# Patient Record
Sex: FEMALE | Race: WHITE | NOT HISPANIC OR LATINO | Employment: UNEMPLOYED | URBAN - NONMETROPOLITAN AREA
[De-identification: names, ages, dates, MRNs, and addresses within clinical notes are randomized per-mention and may not be internally consistent; named-entity substitution may affect disease eponyms.]

---

## 2017-07-05 ENCOUNTER — APPOINTMENT (OUTPATIENT)
Dept: LAB | Facility: HOSPITAL | Age: 52
End: 2017-07-05

## 2017-07-05 ENCOUNTER — OFFICE VISIT (OUTPATIENT)
Dept: FAMILY MEDICINE CLINIC | Facility: CLINIC | Age: 52
End: 2017-07-05

## 2017-07-05 VITALS
BODY MASS INDEX: 24.41 KG/M2 | DIASTOLIC BLOOD PRESSURE: 72 MMHG | SYSTOLIC BLOOD PRESSURE: 124 MMHG | HEIGHT: 64 IN | WEIGHT: 143 LBS

## 2017-07-05 DIAGNOSIS — E78.2 MIXED HYPERLIPIDEMIA: Chronic | ICD-10-CM

## 2017-07-05 DIAGNOSIS — R21 RASH: ICD-10-CM

## 2017-07-05 DIAGNOSIS — Z11.59 NEED FOR HEPATITIS C SCREENING TEST: ICD-10-CM

## 2017-07-05 DIAGNOSIS — Z79.899 HIGH RISK MEDICATION USE: ICD-10-CM

## 2017-07-05 DIAGNOSIS — F33.1 MODERATE EPISODE OF RECURRENT MAJOR DEPRESSIVE DISORDER (HCC): Chronic | ICD-10-CM

## 2017-07-05 DIAGNOSIS — I10 ESSENTIAL HYPERTENSION: Primary | Chronic | ICD-10-CM

## 2017-07-05 DIAGNOSIS — Z23 NEED FOR PNEUMOCOCCAL VACCINE: ICD-10-CM

## 2017-07-05 PROBLEM — R73.03 PREDIABETES: Status: ACTIVE | Noted: 2017-07-05

## 2017-07-05 LAB
ALBUMIN SERPL-MCNC: 4.4 G/DL (ref 3.4–4.8)
ALBUMIN/GLOB SERPL: 1.4 G/DL (ref 1.1–1.8)
ALP SERPL-CCNC: 103 U/L (ref 38–126)
ALT SERPL W P-5'-P-CCNC: 34 U/L (ref 9–52)
ANION GAP SERPL CALCULATED.3IONS-SCNC: 10 MMOL/L (ref 5–15)
ARTICHOKE IGE QN: 153 MG/DL (ref 1–129)
AST SERPL-CCNC: 36 U/L (ref 14–36)
BILIRUB SERPL-MCNC: 0.4 MG/DL (ref 0.2–1.3)
BUN BLD-MCNC: 14 MG/DL (ref 7–21)
BUN/CREAT SERPL: 14.6 (ref 7–25)
CALCIUM SPEC-SCNC: 9.2 MG/DL (ref 8.4–10.2)
CHLORIDE SERPL-SCNC: 103 MMOL/L (ref 95–110)
CHOLEST SERPL-MCNC: 223 MG/DL (ref 0–199)
CO2 SERPL-SCNC: 28 MMOL/L (ref 22–31)
CREAT BLD-MCNC: 0.96 MG/DL (ref 0.5–1)
DEPRECATED RDW RBC AUTO: 41.5 FL (ref 36.4–46.3)
ERYTHROCYTE [DISTWIDTH] IN BLOOD BY AUTOMATED COUNT: 12.2 % (ref 11.5–14.5)
GFR SERPL CREATININE-BSD FRML MDRD: 61 ML/MIN/1.73 (ref 51–120)
GLOBULIN UR ELPH-MCNC: 3.2 GM/DL (ref 2.3–3.5)
GLUCOSE BLD-MCNC: 100 MG/DL (ref 60–100)
HBA1C MFR BLD: 5.75 % (ref 4–5.6)
HCT VFR BLD AUTO: 43.3 % (ref 35–45)
HCV AB SER DONR QL: NEGATIVE
HDLC SERPL-MCNC: 48 MG/DL (ref 60–200)
HGB BLD-MCNC: 14.6 G/DL (ref 12–15.5)
INR PPP: 1.04 (ref 0.8–1.2)
LDLC/HDLC SERPL: 3.23 {RATIO} (ref 0–3.22)
MCH RBC QN AUTO: 31.9 PG (ref 26.5–34)
MCHC RBC AUTO-ENTMCNC: 33.7 G/DL (ref 31.4–36)
MCV RBC AUTO: 94.5 FL (ref 80–98)
PLATELET # BLD AUTO: 175 10*3/MM3 (ref 150–450)
PMV BLD AUTO: 10.7 FL (ref 8–12)
POTASSIUM BLD-SCNC: 4.6 MMOL/L (ref 3.5–5.1)
PROT SERPL-MCNC: 7.6 G/DL (ref 6.3–8.6)
PROTHROMBIN TIME: 13.5 SECONDS (ref 11.1–15.3)
RBC # BLD AUTO: 4.58 10*6/MM3 (ref 3.77–5.16)
SODIUM BLD-SCNC: 141 MMOL/L (ref 137–145)
TRIGL SERPL-MCNC: 99 MG/DL (ref 20–199)
WBC NRBC COR # BLD: 5.97 10*3/MM3 (ref 3.2–9.8)

## 2017-07-05 PROCEDURE — 83036 HEMOGLOBIN GLYCOSYLATED A1C: CPT | Performed by: FAMILY MEDICINE

## 2017-07-05 PROCEDURE — 90471 IMMUNIZATION ADMIN: CPT | Performed by: FAMILY MEDICINE

## 2017-07-05 PROCEDURE — 80061 LIPID PANEL: CPT | Performed by: FAMILY MEDICINE

## 2017-07-05 PROCEDURE — 99214 OFFICE O/P EST MOD 30 MIN: CPT | Performed by: FAMILY MEDICINE

## 2017-07-05 PROCEDURE — 90732 PPSV23 VACC 2 YRS+ SUBQ/IM: CPT | Performed by: FAMILY MEDICINE

## 2017-07-05 PROCEDURE — 86803 HEPATITIS C AB TEST: CPT | Performed by: FAMILY MEDICINE

## 2017-07-05 PROCEDURE — 36415 COLL VENOUS BLD VENIPUNCTURE: CPT | Performed by: FAMILY MEDICINE

## 2017-07-05 PROCEDURE — 85610 PROTHROMBIN TIME: CPT | Performed by: FAMILY MEDICINE

## 2017-07-05 PROCEDURE — 85027 COMPLETE CBC AUTOMATED: CPT | Performed by: FAMILY MEDICINE

## 2017-07-05 PROCEDURE — 80053 COMPREHEN METABOLIC PANEL: CPT | Performed by: FAMILY MEDICINE

## 2017-07-05 RX ORDER — HYDROCHLOROTHIAZIDE 25 MG/1
25 TABLET ORAL DAILY
Qty: 90 TABLET | Refills: 3 | Status: SHIPPED | OUTPATIENT
Start: 2017-07-05 | End: 2018-06-26 | Stop reason: SDUPTHER

## 2017-07-05 RX ORDER — SIMVASTATIN 40 MG
40 TABLET ORAL NIGHTLY
Qty: 90 TABLET | Refills: 3 | Status: SHIPPED | OUTPATIENT
Start: 2017-07-05 | End: 2018-06-26 | Stop reason: SDUPTHER

## 2017-07-05 RX ORDER — BUDESONIDE AND FORMOTEROL FUMARATE DIHYDRATE 160; 4.5 UG/1; UG/1
2 AEROSOL RESPIRATORY (INHALATION)
Qty: 1 INHALER | Refills: 11 | Status: SHIPPED | OUTPATIENT
Start: 2017-07-05 | End: 2017-07-07 | Stop reason: CLARIF

## 2017-07-05 RX ORDER — BETAMETHASONE DIPROPIONATE 0.5 MG/G
OINTMENT TOPICAL 2 TIMES DAILY
Qty: 50 G | Refills: 2 | Status: SHIPPED | OUTPATIENT
Start: 2017-07-05 | End: 2018-04-27

## 2017-07-05 RX ORDER — ALBUTEROL SULFATE 90 UG/1
2 AEROSOL, METERED RESPIRATORY (INHALATION) EVERY 6 HOURS PRN
Qty: 1 INHALER | Refills: 11 | Status: SHIPPED | OUTPATIENT
Start: 2017-07-05 | End: 2018-06-26 | Stop reason: SDUPTHER

## 2017-07-05 RX ORDER — PAROXETINE 10 MG/1
10 TABLET, FILM COATED ORAL EVERY MORNING
Qty: 30 TABLET | Refills: 2 | Status: SHIPPED | OUTPATIENT
Start: 2017-07-05 | End: 2017-09-07 | Stop reason: DRUGHIGH

## 2017-07-05 NOTE — PROGRESS NOTES
Subjective   Patricia Collins is a 52 y.o. female.     Hypertension   This is a chronic problem. The current episode started more than 1 year ago. The problem is unchanged. The problem is controlled. Pertinent negatives include no anxiety, blurred vision, chest pain, headaches, malaise/fatigue, neck pain, orthopnea, palpitations, peripheral edema, PND, shortness of breath or sweats. There are no associated agents to hypertension. Risk factors for coronary artery disease include post-menopausal state and smoking/tobacco exposure. Past treatments include diuretics. The current treatment provides moderate improvement. There are no compliance problems.  There is no history of angina, kidney disease, CAD/MI, CVA, heart failure, left ventricular hypertrophy, PVD or retinopathy.   Hyperlipidemia   This is a chronic problem. The current episode started more than 1 year ago. The problem is uncontrolled. Recent lipid tests were reviewed and are variable. Factors aggravating her hyperlipidemia include smoking. Pertinent negatives include no chest pain or shortness of breath. Current antihyperlipidemic treatment includes statins. The current treatment provides mild improvement of lipids. Compliance problems: She has been out of her medication.    Rash   This is a chronic problem. The current episode started more than 1 year ago. The problem has been gradually worsening since onset. The affected locations include the right foot, left foot, left ankle and right ankle. The rash is characterized by itchiness and redness. She was exposed to nothing. Associated symptoms include coughing and fatigue. Pertinent negatives include no diarrhea, shortness of breath or vomiting. Past treatments include topical steroids. The treatment provided no relief. There is no history of allergies, eczema or varicella.   Depression   Visit Type: follow-up  Patient presents with the following symptoms: anhedonia, depressed mood, fatigue, insomnia,  irritability and restlessness.  Patient is not experiencing: chest pain, choking sensation, compulsions, confusion, decreased concentration, dizziness, dry mouth, excessive worry, feelings of hopelessness, feelings of worthlessness, hypersomnia, hyperventilation, impotence, nervousness/anxiety, palpitations, panic, psychomotor agitation, psychomotor retardation, shortness of breath, suicidal ideas, suicidal planning, thoughts of death, weight gain and weight loss.  Frequency of symptoms: most days   Severity: moderate   Sleep quality: good  Nighttime awakenings: occasional  Compliance with medications:  %             Current Outpatient Prescriptions:   •  albuterol (PROVENTIL HFA;VENTOLIN HFA) 108 (90 BASE) MCG/ACT inhaler, Inhale 2 puffs every 6 (six) hours as needed for wheezing., Disp: , Rfl:   •  budesonide-formoterol (SYMBICORT) 160-4.5 MCG/ACT inhaler, Inhale 2 puffs 2 (two) times a day., Disp: , Rfl:   •  FLUTICASONE FUROATE NA, 1 spray into each nostril daily., Disp: , Rfl:   •  hydrochlorothiazide (HYDRODIURIL) 25 MG tablet, Take 25 mg by mouth daily., Disp: , Rfl:   •  simvastatin (ZOCOR) 40 MG tablet, Take 40 mg by mouth every night., Disp: , Rfl:     The following portions of the patient's history were reviewed and updated as appropriate: allergies, current medications, past family history, past medical history, past social history, past surgical history and problem list.    Review of Systems   Constitutional: Positive for fatigue and irritability. Negative for activity change, appetite change, malaise/fatigue, unexpected weight change, weight gain and weight loss.   Eyes: Negative for blurred vision and visual disturbance.   Respiratory: Positive for cough. Negative for choking, shortness of breath and wheezing.    Cardiovascular: Negative for chest pain, palpitations, orthopnea, leg swelling and PND.   Gastrointestinal: Negative for abdominal distention, abdominal pain, constipation, diarrhea,  "nausea and vomiting.   Genitourinary: Negative for impotence.   Musculoskeletal: Positive for arthralgias. Negative for back pain, gait problem, joint swelling and neck pain.   Skin: Positive for rash. Negative for pallor and wound.   Neurological: Negative for headaches.   Psychiatric/Behavioral: Positive for agitation and dysphoric mood. Negative for confusion, decreased concentration, sleep disturbance and suicidal ideas. The patient has insomnia. The patient is not nervous/anxious.        Objective    Vitals:    07/05/17 0846   BP: 124/72   Weight: 143 lb (64.9 kg)   Height: 64\" (162.6 cm)     Physical Exam   Constitutional: She is oriented to person, place, and time. She appears well-developed and well-nourished. No distress.   Cardiovascular: Normal rate, regular rhythm and normal heart sounds.    No murmur heard.  No LE edema.   Pulmonary/Chest: Effort normal and breath sounds normal. No respiratory distress.   Abdominal: Soft. Bowel sounds are normal. She exhibits no distension. There is no tenderness.   Neurological: She is alert and oriented to person, place, and time.   Skin:   Small erythematous, raised rash on legs BL.  Limited to feet.  Not scaly or warm to touch.  Appear petechial in areas.   Psychiatric: Her behavior is normal. Judgment and thought content normal.   Flat affect and depressed mood.   Nursing note and vitals reviewed.      Assessment/Plan   Problems Addressed this Visit        Cardiovascular and Mediastinum    Hyperlipidemia    Relevant Medications    simvastatin (ZOCOR) 40 MG tablet    Other Relevant Orders    Lipid Panel       Other    Moderate episode of recurrent major depressive disorder    Relevant Medications    PARoxetine (PAXIL) 10 MG tablet      Other Visit Diagnoses     Essential hypertension  (Chronic)   -  Primary    Relevant Medications    hydrochlorothiazide (HYDRODIURIL) 25 MG tablet    Rash        Relevant Medications    betamethasone, augmented, (DIPROLENE) 0.05 % " ointment    Other Relevant Orders    Ambulatory Referral to Dermatology    Protime-INR    Need for hepatitis C screening test        Relevant Orders    Hepatitis C antibody    High risk medication use        Relevant Orders    Comprehensive Metabolic Panel    Hemoglobin A1c    CBC (No Diff)    Need for pneumococcal vaccine        Relevant Orders    Pneumococcal polysaccharide vaccine 23-valent >= 1yo subcutaneous/IM (PPSV23)        1.) HTN-  Well controlled on current medications. Check CMP, A1C today.  2.) Hyperlipidemia-  Will recheck lipids today.  Refilled her medication.  3.) Depression- Will try paxil and see if that helps.    4.) Rash-  Will check CBC and INR today. Referred to dermatology as well.  Called in stronger topical steroid for her.  5.) Hep C screening today.  6.) [id number] given today in the office.  RTC in 1.5 months or sooner PRN.  Will do pap smear at that appointment.

## 2017-07-06 ENCOUNTER — TELEPHONE (OUTPATIENT)
Dept: FAMILY MEDICINE CLINIC | Facility: CLINIC | Age: 52
End: 2017-07-06

## 2017-07-06 NOTE — PROGRESS NOTES
Pr Dr. JOCELINE Rutledge, Ms. Collins has been called with her recent lab results & recommendations.  Continue her current medications and follow-up as planned or sooner if any problems.

## 2017-07-06 NOTE — TELEPHONE ENCOUNTER
Pr Dr. JOCELINE Rutledge, Ms. Collins has been called with her recent lab results & recommendations.  Continue her current medications and follow-up as planned or sooner if any problems.        ----- Message from Alicja Rutledge MD sent at 7/5/2017  5:05 PM CDT -----  Please let her know that her hep c test was negative.  Blood counts looked okay and blood clotting factors normal.  Cholestorol elevated so she needs to restart her medication.  Her blood sugars are elevated but she isn't diabetic.  She is prediabetic.  Will continue to monitor.  For now she just needs to make diet modifications.  Cut back on sugary drinks and carbs.  Will recheck in 6 months.

## 2017-09-07 ENCOUNTER — OFFICE VISIT (OUTPATIENT)
Dept: FAMILY MEDICINE CLINIC | Facility: CLINIC | Age: 52
End: 2017-09-07

## 2017-09-07 VITALS
DIASTOLIC BLOOD PRESSURE: 76 MMHG | HEIGHT: 64 IN | BODY MASS INDEX: 24.3 KG/M2 | SYSTOLIC BLOOD PRESSURE: 122 MMHG | WEIGHT: 142.3 LBS

## 2017-09-07 DIAGNOSIS — Z12.4 ENCOUNTER FOR SCREENING FOR CERVICAL CANCER: ICD-10-CM

## 2017-09-07 DIAGNOSIS — I10 BENIGN HYPERTENSION: ICD-10-CM

## 2017-09-07 DIAGNOSIS — F33.1 MODERATE EPISODE OF RECURRENT MAJOR DEPRESSIVE DISORDER (HCC): Primary | ICD-10-CM

## 2017-09-07 PROCEDURE — 87624 HPV HI-RISK TYP POOLED RSLT: CPT | Performed by: FAMILY MEDICINE

## 2017-09-07 PROCEDURE — 88142 CYTOPATH C/V THIN LAYER: CPT | Performed by: FAMILY MEDICINE

## 2017-09-07 PROCEDURE — 99214 OFFICE O/P EST MOD 30 MIN: CPT | Performed by: FAMILY MEDICINE

## 2017-09-07 RX ORDER — PAROXETINE HYDROCHLORIDE 20 MG/1
20 TABLET, FILM COATED ORAL EVERY MORNING
Qty: 30 TABLET | Refills: 2 | Status: SHIPPED | OUTPATIENT
Start: 2017-09-07 | End: 2018-01-23 | Stop reason: DRUGHIGH

## 2017-09-07 RX ORDER — HYDROXYZINE PAMOATE 25 MG/1
50 CAPSULE ORAL 2 TIMES DAILY PRN
Qty: 120 CAPSULE | Refills: 1 | Status: SHIPPED | OUTPATIENT
Start: 2017-09-07 | End: 2018-01-23 | Stop reason: SINTOL

## 2017-09-07 NOTE — PROGRESS NOTES
Subjective   Patricia Collins is a 52 y.o. female.     Depression   Visit Type: follow-up  Patient presents with the following symptoms: depressed mood, fatigue, insomnia, irritability, muscle tension and nervousness/anxiety.  Patient is not experiencing: anhedonia, chest pain, choking sensation, compulsions, confusion, decreased concentration, dizziness, dry mouth, excessive worry, feelings of hopelessness, feelings of worthlessness, hypersomnia, hyperventilation, impotence, malaise, memory impairment, nausea, obsessions, palpitations, panic, psychomotor agitation, psychomotor retardation, restlessness, shortness of breath, suicidal ideas, suicidal planning, thoughts of death, weight gain and weight loss.  Frequency of symptoms: most days   Severity: moderate   Sleep quality: good  Nighttime awakenings: occasional  Compliance with medications:  %        Hypertension   This is a chronic problem. The current episode started more than 1 year ago. The problem is unchanged. The problem is controlled. Associated symptoms include anxiety. Pertinent negatives include no blurred vision, chest pain, headaches, malaise/fatigue, neck pain, orthopnea, palpitations, peripheral edema, PND, shortness of breath or sweats. There are no associated agents to hypertension. There are no known risk factors for coronary artery disease. Past treatments include diuretics. The current treatment provides moderate improvement. There are no compliance problems.    She needs to have a pap smear. Has been several years since her last one. Last one was normal.   She hasn't had any issues with vaginal discharge or abnormal bleeding.  Doesn't think that she has ever had an abnormal pap smear.      Current Outpatient Prescriptions:   •  albuterol (PROVENTIL HFA;VENTOLIN HFA) 108 (90 BASE) MCG/ACT inhaler, Inhale 2 puffs Every 6 (Six) Hours As Needed for Wheezing., Disp: 1 inhaler, Rfl: 11  •  betamethasone, augmented, (DIPROLENE) 0.05 % ointment,  "Apply  topically 2 (Two) Times a Day., Disp: 50 g, Rfl: 2  •  Fluticasone Furoate-Vilanterol (BREO ELLIPTA) 200-25 MCG/INH aerosol powder , Inhale 1 application Daily., Disp: 1 each, Rfl: 11  •  hydrochlorothiazide (HYDRODIURIL) 25 MG tablet, Take 1 tablet by mouth Daily., Disp: 90 tablet, Rfl: 3  •  PARoxetine (PAXIL) 10 MG tablet, Take 1 tablet by mouth Every Morning., Disp: 30 tablet, Rfl: 2  •  simvastatin (ZOCOR) 40 MG tablet, Take 1 tablet by mouth Every Night., Disp: 90 tablet, Rfl: 3    The following portions of the patient's history were reviewed and updated as appropriate: allergies, current medications, past family history, past medical history, past social history, past surgical history and problem list.    Review of Systems   Constitutional: Positive for fatigue and irritability. Negative for activity change, appetite change, malaise/fatigue, unexpected weight change, weight gain and weight loss.   Eyes: Negative for blurred vision and visual disturbance.   Respiratory: Negative for cough, choking, shortness of breath and wheezing.    Cardiovascular: Negative for chest pain, palpitations, orthopnea, leg swelling and PND.   Gastrointestinal: Negative for abdominal distention, abdominal pain, constipation, diarrhea, nausea and vomiting.   Genitourinary: Negative for impotence.   Musculoskeletal: Negative for arthralgias, back pain, gait problem, joint swelling and neck pain.   Skin: Negative for pallor, rash and wound.   Neurological: Negative for headaches.   Psychiatric/Behavioral: Positive for agitation, dysphoric mood and sleep disturbance. Negative for confusion, decreased concentration and suicidal ideas. The patient is nervous/anxious and has insomnia.        Objective    Vitals:    09/07/17 1437   BP: 122/76   Weight: 142 lb 4.8 oz (64.5 kg)   Height: 64\" (162.6 cm)       Physical Exam   Constitutional: She is oriented to person, place, and time. She appears well-developed and well-nourished. No " distress.   Cardiovascular: Normal rate, regular rhythm and normal heart sounds.    No murmur heard.  No LE edema.   Pulmonary/Chest: Effort normal and breath sounds normal. No respiratory distress.   Abdominal: Soft. Bowel sounds are normal. She exhibits no distension. There is no tenderness.   Genitourinary: Vagina normal and uterus normal. There is no rash, tenderness, lesion or injury on the right labia. There is no rash, tenderness, lesion or injury on the left labia. Cervix exhibits no motion tenderness, no discharge and no friability. Right adnexum displays no mass, no tenderness and no fullness. Left adnexum displays no mass, no tenderness and no fullness.   Neurological: She is alert and oriented to person, place, and time.   Psychiatric: Her behavior is normal. Judgment and thought content normal.   Flat affect and depressed mood.   Nursing note and vitals reviewed.      Assessment/Plan   Problems Addressed this Visit        Cardiovascular and Mediastinum    Benign hypertension       Other    Moderate episode of recurrent major depressive disorder - Primary    Relevant Medications    hydrOXYzine (VISTARIL) 25 MG capsule    PARoxetine (PAXIL) 20 MG tablet      Other Visit Diagnoses     Encounter for screening for cervical cancer         Relevant Orders    Liquid-based Pap Smear, Screening      1.) Depression-  Will increase her paxil. She has been tolerating it without issues.  Will also try visteril PRN at night or with anxiety.  2.) HTN- Doing well on current medications. Reviewed labs with her from her last appointment.  3.) Cervical cancer with co-testing done today in the office.  RTC in 1.5-2 months or sooner PRN

## 2017-09-12 LAB
LAB AP CASE REPORT: NORMAL
LAB AP GYN ADDITIONAL INFORMATION: NORMAL
Lab: NORMAL
PATH INTERP SPEC-IMP: NORMAL
STAT OF ADQ CVX/VAG CYTO-IMP: NORMAL

## 2017-09-15 LAB — HPV I/H RISK 4 DNA CVX QL PROBE+SIG AMP: POSITIVE

## 2017-10-02 ENCOUNTER — OFFICE VISIT (OUTPATIENT)
Dept: FAMILY MEDICINE CLINIC | Facility: CLINIC | Age: 52
End: 2017-10-02

## 2017-10-02 VITALS
BODY MASS INDEX: 23.93 KG/M2 | SYSTOLIC BLOOD PRESSURE: 166 MMHG | WEIGHT: 140.2 LBS | DIASTOLIC BLOOD PRESSURE: 98 MMHG | HEIGHT: 64 IN

## 2017-10-02 DIAGNOSIS — N20.0 RENAL STONE: Primary | ICD-10-CM

## 2017-10-02 LAB
BILIRUB BLD-MCNC: ABNORMAL MG/DL
CLARITY, POC: CLEAR
COLOR UR: YELLOW
GLUCOSE UR STRIP-MCNC: NEGATIVE MG/DL
KETONES UR QL: ABNORMAL
LEUKOCYTE EST, POC: NEGATIVE
NITRITE UR-MCNC: NEGATIVE MG/ML
PH UR: 5 [PH] (ref 5–8)
PROT UR STRIP-MCNC: ABNORMAL MG/DL
RBC # UR STRIP: ABNORMAL /UL
SP GR UR: 1.02 (ref 1–1.03)
UROBILINOGEN UR QL: NORMAL

## 2017-10-02 PROCEDURE — 99213 OFFICE O/P EST LOW 20 MIN: CPT | Performed by: FAMILY MEDICINE

## 2017-10-02 RX ORDER — HYDROCODONE BITARTRATE AND ACETAMINOPHEN 7.5; 325 MG/1; MG/1
1 TABLET ORAL EVERY 6 HOURS PRN
Qty: 40 TABLET | Refills: 0 | Status: SHIPPED | OUTPATIENT
Start: 2017-10-02 | End: 2017-11-07

## 2017-10-02 RX ORDER — TAMSULOSIN HYDROCHLORIDE 0.4 MG/1
1 CAPSULE ORAL NIGHTLY
Qty: 5 CAPSULE | Refills: 0 | Status: SHIPPED | OUTPATIENT
Start: 2017-10-02 | End: 2017-11-07

## 2017-10-02 RX ORDER — IBUPROFEN 800 MG/1
800 TABLET ORAL EVERY 6 HOURS PRN
Qty: 120 TABLET | Refills: 1 | Status: SHIPPED | OUTPATIENT
Start: 2017-10-02 | End: 2018-06-26 | Stop reason: SDUPTHER

## 2017-10-02 NOTE — PROGRESS NOTES
Subjective   Patricia Collins is a 52 y.o. female.     Back Pain   This is a new problem. The current episode started in the past 7 days. The problem occurs constantly. The problem has been waxing and waning since onset. The pain is present in the thoracic spine. The quality of the pain is described as aching and stabbing. The pain does not radiate. The pain is at a severity of 7/10. The pain is moderate. The pain is the same all the time. The symptoms are aggravated by bending, position, sitting and twisting. Stiffness is present all day. Associated symptoms include abdominal pain and dysuria. Pertinent negatives include no bladder incontinence, bowel incontinence, chest pain, fever, headaches, leg pain, numbness, paresis, paresthesias, pelvic pain, perianal numbness, tingling, weakness or weight loss. Risk factors include lack of exercise, menopause and poor posture. She has tried NSAIDs for the symptoms. The treatment provided mild relief.        Current Outpatient Prescriptions:   •  albuterol (PROVENTIL HFA;VENTOLIN HFA) 108 (90 BASE) MCG/ACT inhaler, Inhale 2 puffs Every 6 (Six) Hours As Needed for Wheezing., Disp: 1 inhaler, Rfl: 11  •  betamethasone, augmented, (DIPROLENE) 0.05 % ointment, Apply  topically 2 (Two) Times a Day., Disp: 50 g, Rfl: 2  •  Fluticasone Furoate-Vilanterol (BREO ELLIPTA) 200-25 MCG/INH aerosol powder , Inhale 1 application Daily., Disp: 1 each, Rfl: 11  •  hydrochlorothiazide (HYDRODIURIL) 25 MG tablet, Take 1 tablet by mouth Daily., Disp: 90 tablet, Rfl: 3  •  hydrOXYzine (VISTARIL) 25 MG capsule, Take 2 capsules by mouth 2 (Two) Times a Day As Needed for Itching or Anxiety., Disp: 120 capsule, Rfl: 1  •  PARoxetine (PAXIL) 20 MG tablet, Take 1 tablet by mouth Every Morning., Disp: 30 tablet, Rfl: 2  •  simvastatin (ZOCOR) 40 MG tablet, Take 1 tablet by mouth Every Night., Disp: 90 tablet, Rfl: 3    The following portions of the patient's history were reviewed and updated as  "appropriate: allergies, current medications, past family history, past medical history, past social history, past surgical history and problem list.    Review of Systems   Constitutional: Negative for fever and weight loss.   Cardiovascular: Negative for chest pain.   Gastrointestinal: Positive for abdominal pain. Negative for bowel incontinence.   Genitourinary: Positive for dysuria. Negative for bladder incontinence and pelvic pain.   Musculoskeletal: Positive for back pain.   Neurological: Negative for tingling, weakness, numbness, headaches and paresthesias.       Objective    Vitals:    10/02/17 0803   BP: 166/98   Weight: 140 lb 3.2 oz (63.6 kg)   Height: 64\" (162.6 cm)       Physical Exam   Constitutional: She is oriented to person, place, and time. She appears well-developed and well-nourished. She appears distressed.   Cardiovascular: Normal rate, regular rhythm and normal heart sounds.    No murmur heard.  No LE edema.   Pulmonary/Chest: Effort normal and breath sounds normal. No respiratory distress.   Abdominal: Soft. Bowel sounds are normal. She exhibits no distension. There is tenderness.   Left CVA tenderness   Neurological: She is alert and oriented to person, place, and time.   Psychiatric: She has a normal mood and affect. Her behavior is normal. Judgment and thought content normal.   Nursing note and vitals reviewed.      Assessment/Plan   Problems Addressed this Visit     None      Visit Diagnoses     Renal stone    -  Primary    Relevant Medications    HYDROcodone-acetaminophen (NORCO) 7.5-325 MG per tablet        Will treat pain and called in flomax for her to take. Will wait to get scan. If not better by Thursday or her pain gets worse will call me and I will order a scan.    RTC in 1 months or sooner PRN           "

## 2017-10-09 DIAGNOSIS — R10.9 FLANK PAIN: Primary | ICD-10-CM

## 2017-10-12 ENCOUNTER — TELEPHONE (OUTPATIENT)
Dept: FAMILY MEDICINE CLINIC | Facility: CLINIC | Age: 52
End: 2017-10-12

## 2017-10-12 ENCOUNTER — HOSPITAL ENCOUNTER (OUTPATIENT)
Dept: CT IMAGING | Facility: HOSPITAL | Age: 52
Discharge: HOME | End: 2017-10-12
Admitting: FAMILY MEDICINE

## 2017-10-12 DIAGNOSIS — R10.9 FLANK PAIN: ICD-10-CM

## 2017-10-12 PROCEDURE — 74176 CT ABD & PELVIS W/O CONTRAST: CPT

## 2017-10-12 NOTE — TELEPHONE ENCOUNTER
Pr Dr. JOCELINE Rutledge, Ms. Collins has been called with her recent CT results & recommendations.  Continue her current medications and follow-up as planned or sooner if any problems.      ----- Message from Alicja Rutledge MD sent at 10/12/2017  5:35 PM CDT -----  Please let her know that her CT was negative for stones.  May be issue with back. Maybe she has hurt her back or pulled a muscle.  May have already passed a stone.  Otherwist was negative.  If pain persists she can come in sooner.

## 2017-10-12 NOTE — PROGRESS NOTES
Pr Dr. JOCELINE Rutledge, Ms. Collins has been called with her recent CT results & recommendations.  Continue her current medications and follow-up as planned or sooner if any problems.

## 2017-11-07 ENCOUNTER — OFFICE VISIT (OUTPATIENT)
Dept: FAMILY MEDICINE CLINIC | Facility: CLINIC | Age: 52
End: 2017-11-07

## 2017-11-07 VITALS
SYSTOLIC BLOOD PRESSURE: 132 MMHG | BODY MASS INDEX: 23.9 KG/M2 | HEIGHT: 64 IN | WEIGHT: 140 LBS | DIASTOLIC BLOOD PRESSURE: 88 MMHG

## 2017-11-07 DIAGNOSIS — F33.1 MODERATE EPISODE OF RECURRENT MAJOR DEPRESSIVE DISORDER (HCC): Primary | ICD-10-CM

## 2017-11-07 DIAGNOSIS — R10.9 FLANK PAIN, ACUTE: ICD-10-CM

## 2017-11-07 LAB
BILIRUB BLD-MCNC: NEGATIVE MG/DL
CLARITY, POC: CLEAR
COLOR UR: YELLOW
GLUCOSE UR STRIP-MCNC: NEGATIVE MG/DL
KETONES UR QL: NEGATIVE
LEUKOCYTE EST, POC: NEGATIVE
NITRITE UR-MCNC: NEGATIVE MG/ML
PH UR: 5 [PH] (ref 5–8)
PROT UR STRIP-MCNC: ABNORMAL MG/DL
RBC # UR STRIP: ABNORMAL /UL
SP GR UR: 1.02 (ref 1–1.03)
UROBILINOGEN UR QL: NORMAL

## 2017-11-07 PROCEDURE — 99213 OFFICE O/P EST LOW 20 MIN: CPT | Performed by: FAMILY MEDICINE

## 2017-11-07 NOTE — PATIENT INSTRUCTIONS
Dietary Guidelines to Help Prevent Kidney Stones  Your risk of kidney stones can be decreased by adjusting the foods you eat. The most important thing you can do is drink enough fluid. You should drink enough fluid to keep your urine clear or pale yellow. The following guidelines provide specific information for the type of kidney stone you have had.  GUIDELINES ACCORDING TO TYPE OF KIDNEY STONE  Calcium Oxalate Kidney Stones  · Reduce the amount of salt you eat. Foods that have a lot of salt cause your body to release excess calcium into your urine. The excess calcium can combine with a substance called oxalate to form kidney stones.  · Reduce the amount of animal protein you eat if the amount you eat is excessive. Animal protein causes your body to release excess calcium into your urine. Ask your dietitian how much protein from animal sources you should be eating.  · Avoid foods that are high in oxalates. If you take vitamins, they should have less than 500 mg of vitamin C. Your body turns vitamin C into oxalates. You do not need to avoid fruits and vegetables high in vitamin C.  Calcium Phosphate Kidney Stones  · Reduce the amount of salt you eat to help prevent the release of excess calcium into your urine.  · Reduce the amount of animal protein you eat if the amount you eat is excessive. Animal protein causes your body to release excess calcium into your urine. Ask your dietitian how much protein from animal sources you should be eating.  · Get enough calcium from food or take a calcium supplement (ask your dietitian for recommendations). Food sources of calcium that do not increase your risk of kidney stones include:    Broccoli.    Dairy products, such as cheese and yogurt.    Pudding.  Uric Acid Kidney Stones  · Do not have more than 6 oz of animal protein per day.  FOOD SOURCES  Animal Protein Sources  · Meat (all types).  · Poultry.  · Eggs.  · Fish, seafood.  Foods High in Salt  · Salt seasonings.  · Soy  sauce.  · Teriyaki sauce.  · Cured and processed meats.  · Salted crackers and snack foods.  · Fast food.  · Canned soups and most canned foods.  Foods High in Oxalates  · Grains:    Amaranth.    Barley.    Grits.    Wheat germ.    Bran.    Buckwheat flour.    All bran cereals.    Pretzels.    Whole wheat bread.  · Vegetables:    Beans (wax).    Beets and beet greens.    Niharika greens.    Eggplant.    Escarole.    Leeks.    Okra.    Parsley.    Rutabagas.    Spinach.    Swiss chard.    Tomato paste.    Fried potatoes.    Sweet potatoes.  · Fruits:    Red currants.    Figs.    Kiwi.    Rhubarb.  · Meat and Other Protein Sources:    Beans (dried).    Soy burgers and other soybean products.    Miso.    Nuts (peanuts, almonds, pecans, cashews, hazelnuts).    Nut butters.    Sesame seeds and tahini (paste made of sesame seeds).    Poppy seeds.  · Beverages:    Chocolate drink mixes.    Soy milk.    Instant iced tea.    Juices made from high-oxalate fruits or vegetables.  · Other:    Carob.    Chocolate.    Fruitcake.    Marmalades.     This information is not intended to replace advice given to you by your health care provider. Make sure you discuss any questions you have with your health care provider.     Document Released: 04/13/2012 Document Revised: 12/23/2014 Document Reviewed: 11/14/2014  [employer] Interactive Patient Education ©2017 [employer]

## 2017-11-07 NOTE — PROGRESS NOTES
Subjective   Particia Collins is a 52 y.o. female.     Depression   Visit Type: follow-up  Patient presents with the following symptoms: fatigue, insomnia and memory impairment.  Patient is not experiencing: anhedonia, chest pain, choking sensation, compulsions, confusion, decreased concentration, depressed mood, dizziness, dry mouth, excessive worry, feelings of hopelessness, irritability, malaise, muscle tension, nausea, nervousness/anxiety, obsessions, palpitations, restlessness, shortness of breath, suicidal ideas, suicidal planning, thoughts of death, weight gain and weight loss.  Frequency of symptoms: occasionally   Severity: mild   Sleep quality: non-restorative  Nighttime awakenings: several  Compliance with medications:  %        She has been having right flank pain on and off since the last time that she was here. She had large blood in her urine and I think that she passed a renal stone. She had imaging that was negative, but may have already passed.  She has completed flomax and pain hasn't been as bad. She hasn't had any gross hematuria.  Only had some urinary incontince once. She hasn't had any dysuria.      Current Outpatient Prescriptions:   •  albuterol (PROVENTIL HFA;VENTOLIN HFA) 108 (90 BASE) MCG/ACT inhaler, Inhale 2 puffs Every 6 (Six) Hours As Needed for Wheezing., Disp: 1 inhaler, Rfl: 11  •  betamethasone, augmented, (DIPROLENE) 0.05 % ointment, Apply  topically 2 (Two) Times a Day., Disp: 50 g, Rfl: 2  •  Fluticasone Furoate-Vilanterol (BREO ELLIPTA) 200-25 MCG/INH aerosol powder , Inhale 1 application Daily., Disp: 1 each, Rfl: 11  •  hydrochlorothiazide (HYDRODIURIL) 25 MG tablet, Take 1 tablet by mouth Daily., Disp: 90 tablet, Rfl: 3  •  hydrOXYzine (VISTARIL) 25 MG capsule, Take 2 capsules by mouth 2 (Two) Times a Day As Needed for Itching or Anxiety., Disp: 120 capsule, Rfl: 1  •  ibuprofen (ADVIL,MOTRIN) 800 MG tablet, Take 1 tablet by mouth Every 6 (Six) Hours As Needed for Mild  "Pain ., Disp: 120 tablet, Rfl: 1  •  PARoxetine (PAXIL) 20 MG tablet, Take 1 tablet by mouth Every Morning., Disp: 30 tablet, Rfl: 2  •  simvastatin (ZOCOR) 40 MG tablet, Take 1 tablet by mouth Every Night., Disp: 90 tablet, Rfl: 3    The following portions of the patient's history were reviewed and updated as appropriate: allergies, current medications, past family history, past medical history, past social history, past surgical history and problem list.    Review of Systems   Constitutional: Negative for activity change, appetite change, fatigue, irritability, unexpected weight change, weight gain and weight loss.   Eyes: Negative for visual disturbance.   Respiratory: Negative for cough, choking, shortness of breath and wheezing.    Cardiovascular: Negative for chest pain, palpitations and leg swelling.   Gastrointestinal: Negative for abdominal distention, abdominal pain, constipation, diarrhea, nausea and vomiting.   Genitourinary: Positive for difficulty urinating and flank pain. Negative for dysuria and hematuria.   Musculoskeletal: Negative for arthralgias, back pain, gait problem and joint swelling.   Skin: Negative for pallor, rash and wound.   Neurological: Negative for headaches.   Psychiatric/Behavioral: Negative for confusion, decreased concentration, dysphoric mood, sleep disturbance and suicidal ideas. The patient has insomnia. The patient is not nervous/anxious.        Objective    Vitals:    11/07/17 1032   BP: 132/88   Weight: 140 lb (63.5 kg)   Height: 64\" (162.6 cm)       Physical Exam   Constitutional: She is oriented to person, place, and time. She appears well-developed and well-nourished. No distress.   Cardiovascular: Normal rate, regular rhythm and normal heart sounds.    No murmur heard.  No LE edema.   Pulmonary/Chest: Effort normal and breath sounds normal. No respiratory distress.   Abdominal: Soft. Bowel sounds are normal. She exhibits no distension. There is no tenderness. "   Neurological: She is alert and oriented to person, place, and time.   Psychiatric: She has a normal mood and affect. Her behavior is normal. Judgment and thought content normal.   Nursing note and vitals reviewed.      Assessment/Plan   Problems Addressed this Visit        Other    Moderate episode of recurrent major depressive disorder - Primary      Other Visit Diagnoses     Flank pain, acute        Relevant Orders    POCT urinalysis dipstick, manual (Completed)        1.) Depression-  Seems to be well controlled on current medication and she is tolerating it well. Will continue without change.  2.) Flank pain- UA today showed moderate blood again. I think that she has been passing small stones. WIll recheck her urine when she comes back in and if positive will refer to urology. Discussed diet modifications that would help with renal stone prevention.    RTC in 3 months or sooner PRN

## 2017-11-22 ENCOUNTER — OFFICE VISIT (OUTPATIENT)
Dept: FAMILY MEDICINE CLINIC | Facility: CLINIC | Age: 52
End: 2017-11-22

## 2017-11-22 VITALS
HEIGHT: 64 IN | DIASTOLIC BLOOD PRESSURE: 74 MMHG | BODY MASS INDEX: 23.9 KG/M2 | SYSTOLIC BLOOD PRESSURE: 102 MMHG | WEIGHT: 140 LBS

## 2017-11-22 DIAGNOSIS — N20.0 RENAL STONES: Primary | ICD-10-CM

## 2017-11-22 LAB
BILIRUB BLD-MCNC: ABNORMAL MG/DL
CLARITY, POC: CLEAR
COLOR UR: ABNORMAL
GLUCOSE UR STRIP-MCNC: NEGATIVE MG/DL
KETONES UR QL: NEGATIVE
LEUKOCYTE EST, POC: NEGATIVE
NITRITE UR-MCNC: NEGATIVE MG/ML
PH UR: 5 [PH] (ref 5–8)
PROT UR STRIP-MCNC: ABNORMAL MG/DL
RBC # UR STRIP: ABNORMAL /UL
SP GR UR: 1.03 (ref 1–1.03)
UROBILINOGEN UR QL: NORMAL

## 2017-11-22 PROCEDURE — 99213 OFFICE O/P EST LOW 20 MIN: CPT | Performed by: FAMILY MEDICINE

## 2017-11-22 RX ORDER — HYDROCODONE BITARTRATE AND ACETAMINOPHEN 7.5; 325 MG/1; MG/1
1 TABLET ORAL EVERY 6 HOURS PRN
Qty: 40 TABLET | Refills: 0 | Status: SHIPPED | OUTPATIENT
Start: 2017-11-22 | End: 2018-01-23

## 2017-11-22 RX ORDER — TAMSULOSIN HYDROCHLORIDE 0.4 MG/1
1 CAPSULE ORAL NIGHTLY
Qty: 7 CAPSULE | Refills: 0 | Status: SHIPPED | OUTPATIENT
Start: 2017-11-22 | End: 2018-01-23

## 2017-11-22 NOTE — PROGRESS NOTES
Subjective   Patricia Collins is a 52 y.o. female.     History of Present Illness   Ms Collins is a 51yo female with PMH of HTN and depression that presents today for episode of flank pain on her left side. She had similar issues more then a month ago and had CT that was negative, but her exam and history was concerning for a renal stone then. She has had some hematuria, but that has resolved. Pain is sharp and doesn't radiate anywhere.  She says that this pain is just like the last time. She says that she passed a stone at home and it was white, but she didn't keep it.      Current Outpatient Prescriptions:   •  albuterol (PROVENTIL HFA;VENTOLIN HFA) 108 (90 BASE) MCG/ACT inhaler, Inhale 2 puffs Every 6 (Six) Hours As Needed for Wheezing., Disp: 1 inhaler, Rfl: 11  •  betamethasone, augmented, (DIPROLENE) 0.05 % ointment, Apply  topically 2 (Two) Times a Day., Disp: 50 g, Rfl: 2  •  Fluticasone Furoate-Vilanterol (BREO ELLIPTA) 200-25 MCG/INH aerosol powder , Inhale 1 application Daily., Disp: 1 each, Rfl: 11  •  hydrochlorothiazide (HYDRODIURIL) 25 MG tablet, Take 1 tablet by mouth Daily., Disp: 90 tablet, Rfl: 3  •  hydrOXYzine (VISTARIL) 25 MG capsule, Take 2 capsules by mouth 2 (Two) Times a Day As Needed for Itching or Anxiety., Disp: 120 capsule, Rfl: 1  •  ibuprofen (ADVIL,MOTRIN) 800 MG tablet, Take 1 tablet by mouth Every 6 (Six) Hours As Needed for Mild Pain ., Disp: 120 tablet, Rfl: 1  •  PARoxetine (PAXIL) 20 MG tablet, Take 1 tablet by mouth Every Morning., Disp: 30 tablet, Rfl: 2  •  simvastatin (ZOCOR) 40 MG tablet, Take 1 tablet by mouth Every Night., Disp: 90 tablet, Rfl: 3  •  HYDROcodone-acetaminophen (NORCO) 7.5-325 MG per tablet, Take 1 tablet by mouth Every 6 (Six) Hours As Needed for Moderate Pain ., Disp: 40 tablet, Rfl: 0  •  tamsulosin (FLOMAX) 0.4 MG capsule 24 hr capsule, Take 1 capsule by mouth Every Night., Disp: 7 capsule, Rfl: 0    The following portions of the patient's history were  "reviewed and updated as appropriate: allergies, current medications, past family history, past medical history, past social history, past surgical history and problem list.    Review of Systems   Constitutional: Negative for activity change, appetite change, chills, fatigue and fever.   Respiratory: Negative for cough, shortness of breath and wheezing.    Cardiovascular: Negative for chest pain, palpitations and leg swelling.   Gastrointestinal: Positive for abdominal pain. Negative for constipation, diarrhea, nausea and vomiting.   Genitourinary: Positive for difficulty urinating, dysuria, flank pain and hematuria. Negative for pelvic pain.   Musculoskeletal: Positive for back pain.   Neurological: Negative for weakness, numbness and headaches.   Psychiatric/Behavioral: Negative for dysphoric mood and sleep disturbance. The patient is not nervous/anxious.        Objective    Vitals:    11/22/17 1550   BP: 102/74   Weight: 140 lb (63.5 kg)   Height: 64\" (162.6 cm)       Physical Exam   Constitutional: She is oriented to person, place, and time. She appears well-developed and well-nourished. She appears distressed.   Cardiovascular: Normal rate, regular rhythm and normal heart sounds.    No murmur heard.  No LE edema.   Pulmonary/Chest: Effort normal and breath sounds normal. No respiratory distress.   Abdominal: Soft. Bowel sounds are normal. She exhibits no distension. There is tenderness.   Left CVA tenderness   Neurological: She is alert and oriented to person, place, and time.   Psychiatric: She has a normal mood and affect. Her behavior is normal. Judgment and thought content normal.   Nursing note and vitals reviewed.      Assessment/Plan   Problems Addressed this Visit     None      Visit Diagnoses     Renal stones    -  Primary    Relevant Medications    HYDROcodone-acetaminophen (NORCO) 7.5-325 MG per tablet    Other Relevant Orders    Ambulatory Referral to Urology    Stone Analysis - Calculus, Ureter, " Left    POCT urinalysis dipstick, manual (Completed)        UA showed hematuria and she saw that she passed a stone. Will refer to urology. Discussed types of renal stones and how to avoid them.  Ordered stone analysis and will strain her urine.  Gave flomax and short course of pain medication.  Follow-up as previously planned or sooner PRN.

## 2018-01-23 ENCOUNTER — OFFICE VISIT (OUTPATIENT)
Dept: FAMILY MEDICINE CLINIC | Facility: CLINIC | Age: 53
End: 2018-01-23

## 2018-01-23 ENCOUNTER — APPOINTMENT (OUTPATIENT)
Dept: LAB | Facility: HOSPITAL | Age: 53
End: 2018-01-23

## 2018-01-23 VITALS
SYSTOLIC BLOOD PRESSURE: 146 MMHG | DIASTOLIC BLOOD PRESSURE: 92 MMHG | HEIGHT: 64 IN | WEIGHT: 136 LBS | BODY MASS INDEX: 23.22 KG/M2

## 2018-01-23 DIAGNOSIS — I10 BENIGN HYPERTENSION: ICD-10-CM

## 2018-01-23 DIAGNOSIS — T14.8XXA BRUISING: ICD-10-CM

## 2018-01-23 DIAGNOSIS — F33.1 MODERATE EPISODE OF RECURRENT MAJOR DEPRESSIVE DISORDER (HCC): Primary | ICD-10-CM

## 2018-01-23 DIAGNOSIS — R73.03 PREDIABETES: ICD-10-CM

## 2018-01-23 DIAGNOSIS — H65.93 FLUID LEVEL BEHIND TYMPANIC MEMBRANE OF BOTH EARS: ICD-10-CM

## 2018-01-23 LAB
ALBUMIN SERPL-MCNC: 4.4 G/DL (ref 3.4–4.8)
ALBUMIN/GLOB SERPL: 1.3 G/DL (ref 1.1–1.8)
ALP SERPL-CCNC: 88 U/L (ref 38–126)
ALT SERPL W P-5'-P-CCNC: 30 U/L (ref 9–52)
ANION GAP SERPL CALCULATED.3IONS-SCNC: 8 MMOL/L (ref 5–15)
AST SERPL-CCNC: 42 U/L (ref 14–36)
BILIRUB SERPL-MCNC: 0.4 MG/DL (ref 0.2–1.3)
BUN BLD-MCNC: 13 MG/DL (ref 7–21)
BUN/CREAT SERPL: 14.8 (ref 7–25)
CALCIUM SPEC-SCNC: 10.1 MG/DL (ref 8.4–10.2)
CHLORIDE SERPL-SCNC: 100 MMOL/L (ref 95–110)
CO2 SERPL-SCNC: 27 MMOL/L (ref 22–31)
CREAT BLD-MCNC: 0.88 MG/DL (ref 0.5–1)
DEPRECATED RDW RBC AUTO: 40.4 FL (ref 36.4–46.3)
ERYTHROCYTE [DISTWIDTH] IN BLOOD BY AUTOMATED COUNT: 12 % (ref 11.5–14.5)
GFR SERPL CREATININE-BSD FRML MDRD: 67 ML/MIN/1.73 (ref 51–120)
GLOBULIN UR ELPH-MCNC: 3.4 GM/DL (ref 2.3–3.5)
GLUCOSE BLD-MCNC: 92 MG/DL (ref 60–100)
HCT VFR BLD AUTO: 42 % (ref 35–45)
HGB BLD-MCNC: 14.8 G/DL (ref 12–15.5)
IRON 24H UR-MRATE: 79 MCG/DL (ref 37–170)
MCH RBC QN AUTO: 32.5 PG (ref 26.5–34)
MCHC RBC AUTO-ENTMCNC: 35.2 G/DL (ref 31.4–36)
MCV RBC AUTO: 92.1 FL (ref 80–98)
PLATELET # BLD AUTO: 196 10*3/MM3 (ref 150–450)
PMV BLD AUTO: 10.5 FL (ref 8–12)
POTASSIUM BLD-SCNC: 4.7 MMOL/L (ref 3.5–5.1)
PROT SERPL-MCNC: 7.8 G/DL (ref 6.3–8.6)
RBC # BLD AUTO: 4.56 10*6/MM3 (ref 3.77–5.16)
SODIUM BLD-SCNC: 135 MMOL/L (ref 137–145)
WBC NRBC COR # BLD: 5.51 10*3/MM3 (ref 3.2–9.8)

## 2018-01-23 PROCEDURE — 83540 ASSAY OF IRON: CPT | Performed by: FAMILY MEDICINE

## 2018-01-23 PROCEDURE — 83036 HEMOGLOBIN GLYCOSYLATED A1C: CPT | Performed by: FAMILY MEDICINE

## 2018-01-23 PROCEDURE — 99214 OFFICE O/P EST MOD 30 MIN: CPT | Performed by: FAMILY MEDICINE

## 2018-01-23 PROCEDURE — 80053 COMPREHEN METABOLIC PANEL: CPT | Performed by: FAMILY MEDICINE

## 2018-01-23 PROCEDURE — 36415 COLL VENOUS BLD VENIPUNCTURE: CPT | Performed by: FAMILY MEDICINE

## 2018-01-23 PROCEDURE — 85027 COMPLETE CBC AUTOMATED: CPT | Performed by: FAMILY MEDICINE

## 2018-01-23 RX ORDER — TRAZODONE HYDROCHLORIDE 50 MG/1
50 TABLET ORAL NIGHTLY
Qty: 30 TABLET | Refills: 3 | Status: SHIPPED | OUTPATIENT
Start: 2018-01-23 | End: 2018-06-26

## 2018-01-23 RX ORDER — FLUTICASONE PROPIONATE 50 MCG
2 SPRAY, SUSPENSION (ML) NASAL DAILY
Qty: 1 BOTTLE | Refills: 0 | Status: SHIPPED | OUTPATIENT
Start: 2018-01-23 | End: 2018-11-12 | Stop reason: SDUPTHER

## 2018-01-23 RX ORDER — PAROXETINE 30 MG/1
30 TABLET, FILM COATED ORAL EVERY MORNING
Qty: 30 TABLET | Refills: 3 | Status: SHIPPED | OUTPATIENT
Start: 2018-01-23 | End: 2018-06-26 | Stop reason: SDUPTHER

## 2018-01-23 NOTE — PROGRESS NOTES
Subjective   Patricia Collins is a 52 y.o. female.     Earache    There is pain in the left ear. This is a new problem. The current episode started in the past 7 days. The problem occurs constantly. The problem has been unchanged. There has been no fever. The fever has been present for less than 1 day. The pain is at a severity of 1/10. The pain is mild. Associated symptoms include abdominal pain, coughing, hearing loss and a sore throat. Pertinent negatives include no diarrhea, ear discharge, headaches, neck pain, rash, rhinorrhea or vomiting. She has tried nothing for the symptoms. The treatment provided no relief. There is no history of a chronic ear infection, hearing loss or a tympanostomy tube.   Hypertension   This is a chronic problem. The current episode started more than 1 year ago. The problem is unchanged. The problem is controlled. Associated symptoms include anxiety. Pertinent negatives include no blurred vision, chest pain, headaches, malaise/fatigue, neck pain, orthopnea, palpitations, peripheral edema, PND, shortness of breath or sweats. There are no associated agents to hypertension. Risk factors for coronary artery disease include stress, post-menopausal state and smoking/tobacco exposure. Past treatments include diuretics. The current treatment provides moderate improvement. There are no compliance problems.  There is no history of angina, kidney disease, CAD/MI, CVA, heart failure, left ventricular hypertrophy, PVD or retinopathy.   Depression   Visit Type: follow-up  Patient presents with the following symptoms: fatigue, irritability, muscle tension, nervousness/anxiety, restlessness and weight loss.  Patient is not experiencing: anhedonia, chest pain, choking sensation, compulsions, confusion, decreased concentration, depressed mood, dizziness, dry mouth, excessive worry, feelings of hopelessness, feelings of worthlessness, hypersomnia, hyperventilation, impotence, insomnia, malaise, memory  impairment, nausea, obsessions, palpitations, panic, psychomotor agitation, psychomotor retardation, shortness of breath, suicidal ideas, suicidal planning, thoughts of death and weight gain.  Frequency of symptoms: occasionally   Severity: moderate   Sleep quality: non-restorative  Nighttime awakenings: several  Compliance with medications:  %        She was told that she was prediabetic in the past and she has been working on her diet and she has lost some weight since she was here. She hasn't been checking her sugars at home.    She has been tired, hasn't been sleeping well. She has been bruising a lot.  She says that has become an issue since she was here last time.        Current Outpatient Prescriptions:   •  albuterol (PROVENTIL HFA;VENTOLIN HFA) 108 (90 BASE) MCG/ACT inhaler, Inhale 2 puffs Every 6 (Six) Hours As Needed for Wheezing., Disp: 1 inhaler, Rfl: 11  •  betamethasone, augmented, (DIPROLENE) 0.05 % ointment, Apply  topically 2 (Two) Times a Day., Disp: 50 g, Rfl: 2  •  Fluticasone Furoate-Vilanterol (BREO ELLIPTA) 200-25 MCG/INH aerosol powder , Inhale 1 application Daily., Disp: 1 each, Rfl: 11  •  hydrochlorothiazide (HYDRODIURIL) 25 MG tablet, Take 1 tablet by mouth Daily., Disp: 90 tablet, Rfl: 3  •  hydrOXYzine (VISTARIL) 25 MG capsule, Take 2 capsules by mouth 2 (Two) Times a Day As Needed for Itching or Anxiety., Disp: 120 capsule, Rfl: 1  •  ibuprofen (ADVIL,MOTRIN) 800 MG tablet, Take 1 tablet by mouth Every 6 (Six) Hours As Needed for Mild Pain ., Disp: 120 tablet, Rfl: 1  •  PARoxetine (PAXIL) 20 MG tablet, Take 1 tablet by mouth Every Morning., Disp: 30 tablet, Rfl: 2  •  simvastatin (ZOCOR) 40 MG tablet, Take 1 tablet by mouth Every Night., Disp: 90 tablet, Rfl: 3    The following portions of the patient's history were reviewed and updated as appropriate: allergies, current medications, past family history, past medical history, past social history, past surgical history and  "problem list.    Review of Systems   Constitutional: Positive for activity change, appetite change, fatigue, irritability and weight loss. Negative for chills, fever, malaise/fatigue, unexpected weight change and weight gain.   HENT: Positive for ear pain, hearing loss and sore throat. Negative for ear discharge, rhinorrhea and trouble swallowing.    Eyes: Negative for blurred vision.   Respiratory: Positive for cough. Negative for choking and shortness of breath.    Cardiovascular: Negative for chest pain, palpitations, orthopnea, leg swelling and PND.   Gastrointestinal: Positive for abdominal pain. Negative for abdominal distention, diarrhea and vomiting.   Genitourinary: Positive for flank pain. Negative for dysuria, hematuria and impotence.   Musculoskeletal: Negative for neck pain.   Skin: Negative for rash.   Neurological: Negative for headaches.   Psychiatric/Behavioral: Positive for dysphoric mood and sleep disturbance. Negative for confusion, decreased concentration, self-injury and suicidal ideas. The patient is nervous/anxious. The patient does not have insomnia.        Objective    Vitals:    01/23/18 1455   BP: 146/92   Weight: 61.7 kg (136 lb)   Height: 162.6 cm (64\")       Physical Exam   Constitutional: She is oriented to person, place, and time. She appears well-developed and well-nourished. No distress.   HENT:   Right Ear: Hearing, external ear and ear canal normal. A middle ear effusion is present.   Left Ear: Hearing, external ear and ear canal normal. A middle ear effusion is present.   Nose: Mucosal edema and rhinorrhea present. Right sinus exhibits no maxillary sinus tenderness and no frontal sinus tenderness. Left sinus exhibits no maxillary sinus tenderness and no frontal sinus tenderness.   Mouth/Throat: Posterior oropharyngeal erythema present. No oropharyngeal exudate or posterior oropharyngeal edema.   Cardiovascular: Normal rate, regular rhythm and normal heart sounds.    No murmur " heard.  No LE edema.   Pulmonary/Chest: Effort normal and breath sounds normal. No respiratory distress.   Abdominal: Soft. Bowel sounds are normal. She exhibits no distension. There is no tenderness.   Neurological: She is alert and oriented to person, place, and time.   Psychiatric: She has a normal mood and affect. Her behavior is normal. Judgment and thought content normal.   Nursing note and vitals reviewed.      Assessment/Plan   Problems Addressed this Visit        Cardiovascular and Mediastinum    Benign hypertension       Other    Moderate episode of recurrent major depressive disorder - Primary    Relevant Medications    traZODone (DESYREL) 50 MG tablet    PARoxetine (PAXIL) 30 MG tablet    Prediabetes    Relevant Orders    Comprehensive Metabolic Panel (Completed)    Hemoglobin A1c      Other Visit Diagnoses     Fluid level behind tympanic membrane of both ears        Bruising        Relevant Orders    CBC (No Diff) (Completed)    Iron (Completed)        1.) Depression- She doesn't think that the paxil is working well. Will increase to 30MG and see if that helps more.  Add trazodone at night to help her sleep.    2.) HTN-  Will continue current medications for now.  BP elevated today but has been normal.  Will adjust medication if elevated when she comes back.  3.) Prediabetes-  Will recheck A1C.    4.) Bruising-  Will check CBC and iron levels. She admits that her diet isn't the best.  5.) Middle ear effusion- Will call in flonase and decongestant to help with sinus drainage and fluid behind TM.  RTC in 3 months or sooner PRN

## 2018-01-24 LAB — HBA1C MFR BLD: 5.6 % (ref 4–5.6)

## 2018-01-29 ENCOUNTER — TELEPHONE (OUTPATIENT)
Dept: FAMILY MEDICINE CLINIC | Facility: CLINIC | Age: 53
End: 2018-01-29

## 2018-01-29 NOTE — TELEPHONE ENCOUNTER
Called patient with labs. Advised per Dr. Shane recommendation to start a multivitamin daily to see if it helps with bruising. Patient verbalized understtanding

## 2018-01-29 NOTE — TELEPHONE ENCOUNTER
----- Message from Alicja Rutledge MD sent at 1/26/2018 12:31 PM CST -----  Please let her know that her sugars are looking better and are in normal range.  Her iron level was normal and her CBC was normal so she isn't anemic. Unsure why she is bruising more.  May try daily multivitamin and see if that helps.  Follow-up as planned

## 2018-02-16 ENCOUNTER — PREP FOR SURGERY (OUTPATIENT)
Dept: OTHER | Facility: HOSPITAL | Age: 53
End: 2018-02-16

## 2018-02-16 DIAGNOSIS — IMO0002 URETHRAL STENOSIS: Primary | ICD-10-CM

## 2018-02-16 RX ORDER — LEVOFLOXACIN 5 MG/ML
500 INJECTION, SOLUTION INTRAVENOUS ONCE
Status: CANCELLED | OUTPATIENT
Start: 2018-02-21 | End: 2018-02-21

## 2018-02-20 ENCOUNTER — APPOINTMENT (OUTPATIENT)
Dept: PREADMISSION TESTING | Facility: HOSPITAL | Age: 53
End: 2018-02-20

## 2018-02-20 VITALS
DIASTOLIC BLOOD PRESSURE: 86 MMHG | SYSTOLIC BLOOD PRESSURE: 140 MMHG | OXYGEN SATURATION: 97 % | HEART RATE: 89 BPM | HEIGHT: 64 IN | WEIGHT: 135 LBS | RESPIRATION RATE: 16 BRPM | BODY MASS INDEX: 23.05 KG/M2

## 2018-02-20 DIAGNOSIS — IMO0002 URETHRAL STENOSIS: ICD-10-CM

## 2018-02-20 LAB
BILIRUB UR QL STRIP: NEGATIVE
CLARITY UR: CLEAR
COLOR UR: YELLOW
GLUCOSE UR STRIP-MCNC: NEGATIVE MG/DL
HGB UR QL STRIP.AUTO: ABNORMAL
KETONES UR QL STRIP: NEGATIVE
LEUKOCYTE ESTERASE UR QL STRIP.AUTO: NEGATIVE
NITRITE UR QL STRIP: NEGATIVE
PH UR STRIP.AUTO: 5.5 [PH] (ref 5–9)
PROT UR QL STRIP: NEGATIVE
SP GR UR STRIP: 1.01 (ref 1–1.03)
UROBILINOGEN UR QL STRIP: ABNORMAL

## 2018-02-20 PROCEDURE — 93010 ELECTROCARDIOGRAM REPORT: CPT | Performed by: INTERNAL MEDICINE

## 2018-02-20 PROCEDURE — 81003 URINALYSIS AUTO W/O SCOPE: CPT | Performed by: UROLOGY

## 2018-02-20 PROCEDURE — 93005 ELECTROCARDIOGRAM TRACING: CPT

## 2018-02-20 RX ORDER — SODIUM CHLORIDE 9 MG/ML
1000 INJECTION, SOLUTION INTRAVENOUS CONTINUOUS PRN
Status: CANCELLED | OUTPATIENT
Start: 2018-02-21

## 2018-02-20 RX ORDER — SODIUM CHLORIDE, SODIUM GLUCONATE, SODIUM ACETATE, POTASSIUM CHLORIDE, AND MAGNESIUM CHLORIDE 526; 502; 368; 37; 30 MG/100ML; MG/100ML; MG/100ML; MG/100ML; MG/100ML
1000 INJECTION, SOLUTION INTRAVENOUS CONTINUOUS PRN
Status: CANCELLED | OUTPATIENT
Start: 2018-02-20

## 2018-02-20 NOTE — H&P
[facility] History and Physical  JOSH WING  52-year-old; :1965;;female  [address];[place] [address]  Ins: 1) [facility]  MRN:[id number]  MYRA TOBIAS MD  [facility] [place]  2018 09:42 AM  Allergies  codeine Unknown  Current Medications  Paxil 10 mg oral tablet  hydrALAZINE 25 mg oral tablet  hydrALAZINE 25 mg oral tablet  Breo Ellipta 200 mcg-25 mcg/inh inhalation  powder powder  * Medications Reconciled  Problem List  Urethral stenosis 2018  Medical History  Essential hypertension  Cataract  Surgical History    breast Biopsy  Psychiatric History  Patient is generally satisfied with life and has no  thoughts of suicide. Has depression and anxiety.  Family History  Family history of cancer of colon  Heart disease  Essential hypertension  Mother  Father  Brother  Alive  Social History  Pt currently smokes 1 pack of cigarettes a day  and occassinally smokes. She is .  Notes  PRE DIAGNOSIS:  Hematuria.  POST DIAGNOSIS:  Urethral stenosis.  ANESTHESIA: Under sterile conditions, Xylocaine jelly was inserted into the  urethra for local anesthesia.  PROCEDURE DETAILS:  The history, physical findings, current medications, and indications  were reviewed prior to the procedure. I discussed the procedure  with the patient, including possible complications. Patient was  prepped and draped in the usual fashion.  Urethra: Tight urethral stenosis.  Bladder: Bladder shows no abnormalities. No foreign bodies found.  No evidence of lesion. No stones noted. Normal bladder  expansion.  Ureter: Clear efflux noted both orifices. Orifices normal  configuration and location.  The cystoscope was removed. The patient tolerated the procedure  well.  COMPLICATIONS:  No Complications.  FINDINGS: Tight urethral stenosis.  INSTRUCTIONS:Appropriate post procedure instructions given.  Verbalized  understanding.  History of Present Illness  Patient is here today for cystoscopy. She has history of hematuria.  Review of Systems  Eyes: ; Denies: blurry vision, pain in the eyes and double vision  ENMT: ; Denies: ear pain, sore throat and sinus problems  Cardiovascular: ; Denies: chest pain, varicose veins, angina and syncope  Respiratory: Reports: shortness of breath and frequent cough; Denies: wheezing  Gastrointestinal: ; Denies: abdominal pain, nausea, vomiting, indigestion and  heartburn  Genitourinary: ; Denies: other symptoms (see HPI)  Musculoskeletal: ; Denies: joint pain, neck pain and back pain  Integumentary: Reports: skin rash; Denies: boils and persistent itch  Neurological: ; Denies: tremors, dizzy spells and numbness / tingling  Psychiatric: Reports: generally satisfied with life, depression and anxiety; Denies:  suicidal ideation  Endocrine: ; Denies: Excessive thirst, cold intolerance, heat intolerance and  tired/sluggish  Hematologic/Lymphatic: ; Denies: swollen glands and blood clotting problem  Allergic/Immunologic: ; Denies: hayfever  Constitutional: ; Denies: fever, chills and weight loss  Vital Signs  2/16/2018 9:42:00 AM  Weight: 137 (lb) Resp Rate: 18 (/min)  Height: 64 (in) BMI: 23.52  Current every day smoker  Exam  General appearance: The patient appears well developed and well nourished, in no  apparent acute distress.  Assessment of hearing: Normal.  Examination of neck: Neck appears supple.  Assessment of respiratory effort: Respiratory effort appears normal. No apparent  distress.  Inspection of breasts: Deferred.  Obtain stool sample: Stool specimen for occult blood is not indicated.  Examination of gait and station: Normal.  Inspection of skin and subcutaneous tissue: Exam of the skin is within normal limits.  The color and skin turgor are normal.  No lesions, bruises, rashes, or jaundice.  Orientation to time, place and person: Oriented to person, place, and  time.  Recent and remote memory: Normal.  Mood and affect: Normal.  Data Review  Medications and chart reviewed. History and physical form/ROS reviewed and no  changes noted. Previous encounter reviewed. Last form done 01/30/18.  Assessment - Urethral stenosis  DX:  Urethral stenosis  SNO: [id number], ICD-9: 598.9, ICD-10: N35.9  Plan  Plan Notes:  Cystoscopy with urethral dilation.  Procedures:  43701 CYSTOSCOPY  Education:  Cystoscopy - English  Discussion:  Discussed my findings and plan of action and reasoning behind decision making. All  questions were answered. FINDINGS WERE DISCUSSED WITH PATIENT. RISKS  AND BENEFITS EXPLAINED. PATIENT WISHES TO PROCEED.  Instructions:  Patient is instructed to call with any problems. Patient is instructed to call if the  condition worsens. Patient is instructed to call with any changes in condition.

## 2018-02-20 NOTE — DISCHARGE INSTRUCTIONS
[facility]  Pre-op Information and Guidelines    You will be called after 2 p.m. the day before your surgery (Friday for Monday surgery) and notified of your time for arrival and approximate surgery time.  If you have not received a call by 4P.M., please contact Same Day Surgery at [phone] of if outside [facility] call [phone].    Please Follow these Important Safety Guidelines:    • The morning of your procedure, take only the medications listed below with   A sip of water:_____________________________________________       ______________________________________________    • DO NOT eat or drink anything after 12:00 midnight the night before surgery  Specific instructions concerning drinking clear liquids will be discussed during  the pre-surgery instruction call the day before your surgery.    • If you take a blood thinner (ex. Plavix, Coumadin, aspirin), ask your doctor when to stop it before surgery  STOP DATE: _________________    • Only 2 visitors are allowed in patient rooms at a time  Your visitors will be asked to wait in the lobby until the admission process is complete with the exception of a parent with a child and patients in need of special assistance.    • YOU CANNOT DRIVE YOURSELF HOME  You must be accompanied by someone who will be responsible for driving you home after surgery and for your care at home.    • DO NOT chew gum, use breath mints, hard candy, or smoke the day of surgery  • DO NOT drink alcohol for at least 24 hours before your surgery  • DO NOT wear any jewelry and remove all body piercing before coming to the hospital  • DO NOT wear make-up to the hospital  • If you are having surgery on an extremity (arm/leg/foot) remove nail polish/artificial nails on the surgical side  • Clothing, glasses, contacts, dentures, and hairpieces must be removed before surgery  • Bathe the night before or the morning of your surgery and do not use powders/lotions on  skin.

## 2018-02-21 ENCOUNTER — HOSPITAL ENCOUNTER (OUTPATIENT)
Facility: HOSPITAL | Age: 53
Setting detail: HOSPITAL OUTPATIENT SURGERY
Discharge: HOME | End: 2018-02-21
Attending: UROLOGY | Admitting: UROLOGY

## 2018-02-21 ENCOUNTER — ANESTHESIA (OUTPATIENT)
Dept: PERIOP | Facility: HOSPITAL | Age: 53
End: 2018-02-21

## 2018-02-21 ENCOUNTER — ANESTHESIA EVENT (OUTPATIENT)
Dept: PERIOP | Facility: HOSPITAL | Age: 53
End: 2018-02-21

## 2018-02-21 VITALS
HEIGHT: 64 IN | HEART RATE: 64 BPM | DIASTOLIC BLOOD PRESSURE: 65 MMHG | BODY MASS INDEX: 23.15 KG/M2 | OXYGEN SATURATION: 97 % | WEIGHT: 135.58 LBS | RESPIRATION RATE: 18 BRPM | SYSTOLIC BLOOD PRESSURE: 142 MMHG | TEMPERATURE: 97.3 F

## 2018-02-21 DIAGNOSIS — IMO0002 URETHRAL STENOSIS: ICD-10-CM

## 2018-02-21 LAB — GLUCOSE BLDC GLUCOMTR-MCNC: 99 MG/DL (ref 70–130)

## 2018-02-21 PROCEDURE — 25010000002 ONDANSETRON PER 1 MG: Performed by: NURSE ANESTHETIST, CERTIFIED REGISTERED

## 2018-02-21 PROCEDURE — 25010000002 LEVOFLOXACIN PER 250 MG: Performed by: UROLOGY

## 2018-02-21 PROCEDURE — 25010000002 FENTANYL CITRATE (PF) 100 MCG/2ML SOLUTION: Performed by: NURSE ANESTHETIST, CERTIFIED REGISTERED

## 2018-02-21 PROCEDURE — C1894 INTRO/SHEATH, NON-LASER: HCPCS | Performed by: UROLOGY

## 2018-02-21 PROCEDURE — 25010000002 MIDAZOLAM PER 1 MG: Performed by: NURSE ANESTHETIST, CERTIFIED REGISTERED

## 2018-02-21 PROCEDURE — 82962 GLUCOSE BLOOD TEST: CPT

## 2018-02-21 PROCEDURE — 25010000002 PROPOFOL 10 MG/ML EMULSION: Performed by: NURSE ANESTHETIST, CERTIFIED REGISTERED

## 2018-02-21 RX ORDER — LIDOCAINE HYDROCHLORIDE 20 MG/ML
INJECTION, SOLUTION INFILTRATION; PERINEURAL AS NEEDED
Status: DISCONTINUED | OUTPATIENT
Start: 2018-02-21 | End: 2018-02-21 | Stop reason: SURG

## 2018-02-21 RX ORDER — LABETALOL HYDROCHLORIDE 5 MG/ML
5 INJECTION, SOLUTION INTRAVENOUS
Status: DISCONTINUED | OUTPATIENT
Start: 2018-02-21 | End: 2018-02-21 | Stop reason: HOSPADM

## 2018-02-21 RX ORDER — FENTANYL CITRATE 50 UG/ML
INJECTION, SOLUTION INTRAMUSCULAR; INTRAVENOUS AS NEEDED
Status: DISCONTINUED | OUTPATIENT
Start: 2018-02-21 | End: 2018-02-21 | Stop reason: SURG

## 2018-02-21 RX ORDER — SODIUM CHLORIDE 9 MG/ML
1000 INJECTION, SOLUTION INTRAVENOUS CONTINUOUS PRN
Status: DISCONTINUED | OUTPATIENT
Start: 2018-02-21 | End: 2018-02-21 | Stop reason: HOSPADM

## 2018-02-21 RX ORDER — NALOXONE HCL 0.4 MG/ML
0.2 VIAL (ML) INJECTION AS NEEDED
Status: DISCONTINUED | OUTPATIENT
Start: 2018-02-21 | End: 2018-02-21 | Stop reason: HOSPADM

## 2018-02-21 RX ORDER — ONDANSETRON 2 MG/ML
INJECTION INTRAMUSCULAR; INTRAVENOUS AS NEEDED
Status: DISCONTINUED | OUTPATIENT
Start: 2018-02-21 | End: 2018-02-21 | Stop reason: SURG

## 2018-02-21 RX ORDER — ACETAMINOPHEN 650 MG/1
650 SUPPOSITORY RECTAL ONCE AS NEEDED
Status: DISCONTINUED | OUTPATIENT
Start: 2018-02-21 | End: 2018-02-21 | Stop reason: HOSPADM

## 2018-02-21 RX ORDER — LEVOFLOXACIN 5 MG/ML
500 INJECTION, SOLUTION INTRAVENOUS ONCE
Status: COMPLETED | OUTPATIENT
Start: 2018-02-21 | End: 2018-02-21

## 2018-02-21 RX ORDER — DIPHENHYDRAMINE HYDROCHLORIDE 50 MG/ML
12.5 INJECTION INTRAMUSCULAR; INTRAVENOUS
Status: DISCONTINUED | OUTPATIENT
Start: 2018-02-21 | End: 2018-02-21 | Stop reason: HOSPADM

## 2018-02-21 RX ORDER — EPHEDRINE SULFATE 50 MG/ML
5 INJECTION, SOLUTION INTRAVENOUS ONCE AS NEEDED
Status: DISCONTINUED | OUTPATIENT
Start: 2018-02-21 | End: 2018-02-21 | Stop reason: HOSPADM

## 2018-02-21 RX ORDER — PROPOFOL 10 MG/ML
VIAL (ML) INTRAVENOUS AS NEEDED
Status: DISCONTINUED | OUTPATIENT
Start: 2018-02-21 | End: 2018-02-21 | Stop reason: SURG

## 2018-02-21 RX ORDER — ONDANSETRON 2 MG/ML
4 INJECTION INTRAMUSCULAR; INTRAVENOUS ONCE AS NEEDED
Status: DISCONTINUED | OUTPATIENT
Start: 2018-02-21 | End: 2018-02-21 | Stop reason: HOSPADM

## 2018-02-21 RX ORDER — OXYCODONE AND ACETAMINOPHEN 7.5; 325 MG/1; MG/1
1-2 TABLET ORAL EVERY 4 HOURS PRN
Qty: 10 TABLET | Refills: 0 | Status: SHIPPED | OUTPATIENT
Start: 2018-02-21 | End: 2018-04-27

## 2018-02-21 RX ORDER — MIDAZOLAM HYDROCHLORIDE 1 MG/ML
INJECTION INTRAMUSCULAR; INTRAVENOUS AS NEEDED
Status: DISCONTINUED | OUTPATIENT
Start: 2018-02-21 | End: 2018-02-21 | Stop reason: SURG

## 2018-02-21 RX ORDER — CIPROFLOXACIN 250 MG/1
250 TABLET, FILM COATED ORAL 2 TIMES DAILY
Qty: 20 TABLET | Refills: 0 | Status: SHIPPED | OUTPATIENT
Start: 2018-02-21 | End: 2018-04-27

## 2018-02-21 RX ORDER — MEPERIDINE HYDROCHLORIDE 50 MG/ML
12.5 INJECTION INTRAMUSCULAR; INTRAVENOUS; SUBCUTANEOUS
Status: DISCONTINUED | OUTPATIENT
Start: 2018-02-21 | End: 2018-02-21 | Stop reason: HOSPADM

## 2018-02-21 RX ORDER — ACETAMINOPHEN 325 MG/1
650 TABLET ORAL ONCE AS NEEDED
Status: DISCONTINUED | OUTPATIENT
Start: 2018-02-21 | End: 2018-02-21 | Stop reason: HOSPADM

## 2018-02-21 RX ORDER — FLUMAZENIL 0.1 MG/ML
0.2 INJECTION INTRAVENOUS AS NEEDED
Status: DISCONTINUED | OUTPATIENT
Start: 2018-02-21 | End: 2018-02-21 | Stop reason: HOSPADM

## 2018-02-21 RX ADMIN — ONDANSETRON 4 MG: 2 INJECTION INTRAMUSCULAR; INTRAVENOUS at 18:45

## 2018-02-21 RX ADMIN — SODIUM CHLORIDE 1000 ML: 9 INJECTION, SOLUTION INTRAVENOUS at 13:59

## 2018-02-21 RX ADMIN — LIDOCAINE HYDROCHLORIDE 60 MG: 20 INJECTION, SOLUTION INFILTRATION; PERINEURAL at 18:35

## 2018-02-21 RX ADMIN — MIDAZOLAM 2 MG: 1 INJECTION INTRAMUSCULAR; INTRAVENOUS at 18:28

## 2018-02-21 RX ADMIN — LEVOFLOXACIN 500 MG: 5 INJECTION, SOLUTION INTRAVENOUS at 18:36

## 2018-02-21 RX ADMIN — FENTANYL CITRATE 50 MCG: 50 INJECTION, SOLUTION INTRAMUSCULAR; INTRAVENOUS at 18:45

## 2018-02-21 RX ADMIN — FENTANYL CITRATE 50 MCG: 50 INJECTION, SOLUTION INTRAMUSCULAR; INTRAVENOUS at 18:33

## 2018-02-21 RX ADMIN — PROPOFOL 120 MG: 10 INJECTION, EMULSION INTRAVENOUS at 18:35

## 2018-02-21 NOTE — ANESTHESIA PREPROCEDURE EVALUATION
Anesthesia Evaluation     no history of anesthetic complications:  NPO Solid Status: > 8 hours  NPO Liquid Status: > 2 hours           Airway   Mallampati: II  TM distance: >3 FB  Neck ROM: full  no difficulty expected  Dental    (+) lower dentures and upper dentures    Pulmonary     breath sounds clear to auscultation  (+) a smoker (1.5 PPD) Current Abstained day of surgery, COPD, decreased breath sounds,   Cardiovascular   Exercise tolerance: poor (<4 METS)    ECG reviewed  Rhythm: regular  Rate: normal    (+) hypertension well controlled, hyperlipidemia  (-) angina, murmur    ROS comment: Sinus rhythm with marked sinus arrhythmia  Otherwise normal ECG  No previous ECGs available  Confirmed by KWAME    Neuro/Psych  (+) psychiatric history Anxiety and Depression,     GI/Hepatic/Renal/Endo    (+)  diabetes mellitus (pre diabetic),     Musculoskeletal (-) negative ROS    Abdominal  - normal exam   Substance History   (+) alcohol use (socially),      OB/GYN negative ob/gyn ROS         Other                        Anesthesia Plan    ASA 3     general     intravenous induction   Anesthetic plan and risks discussed with patient and spouse/significant other.

## 2018-02-22 NOTE — PLAN OF CARE
Problem: Patient Care Overview (Adult)  Goal: Plan of Care Review  Outcome: Outcome(s) achieved Date Met: 02/21/18    Goal: Adult Individualization and Mutuality  Outcome: Outcome(s) achieved Date Met: 02/21/18    Goal: Discharge Needs Assessment  Outcome: Outcome(s) achieved Date Met: 02/21/18      Problem: Perioperative Period (Adult)  Goal: Signs and Symptoms of Listed Potential Problems Will be Absent or Manageable (Perioperative Period)  Outcome: Outcome(s) achieved Date Met: 02/21/18

## 2018-02-22 NOTE — PLAN OF CARE
Problem: Patient Care Overview (Adult)  Goal: Plan of Care Review  Outcome: Ongoing (interventions implemented as appropriate)   02/21/18 3492   Coping/Psychosocial Response Interventions   Plan Of Care Reviewed With patient   Patient Care Overview   Progress improving   Outcome Evaluation   Outcome Summary/Follow up Plan VSS. no c/o pain or any discomfort. meets d/c criteria       Problem: Perioperative Period (Adult)  Goal: Signs and Symptoms of Listed Potential Problems Will be Absent or Manageable (Perioperative Period)  Outcome: Ongoing (interventions implemented as appropriate)

## 2018-02-22 NOTE — OP NOTE
CYSTOSCOPY  Procedure Note    Patricia Collins  2/21/2018    Pre-op Diagnosis:   Urethral stenosis [N35.9]    Post-op Diagnosis:     Post-Op Diagnosis Codes:     * Urethral stenosis [N35.9]      Procedure(s):  CYSTOSCOPY; URETHRAL DILATION    Surgeon(s):  Mychal Cabrera MD    Anesthesia: General    Staff:   Circulator: Charmaine Butler RN  Scrub Person: Kayleigh Griffin  Assistant: Lori Dang CSA    Estimated Blood Loss: none    Specimens:                None         Drains:   Urethral Catheter 02/21/18 1845 100% silicone 16 (Active)   Daily Indications Selected surgeries ( tract, abdomen) 2/21/2018  7:21 PM   Securement secured to upper leg with adhesive device 2/21/2018  6:52 PM   Urine Output (mL) 100 2/21/2018  7:34 PM           Findings: Urethral stenosis    Complications: None    Indications: Same    Description of Procedure: Patient brought to cystoscopy place in the dorsolithotomy position.  I took a female sounds starting at 8 Sur[place]amese up through 22 [place]amese and dilated the urethra.  The ostomy was carried out with a 17 [place]e cystoscope no signs of infection tumor or calculi urethral stenosis had been dilated was noted.  We anchored a #16 [place] catheter 10 cc placed in the balloon the patient taken recovery and tolerated procedure well    Mychal Cabrera MD     Date: 2/21/2018  Time: 9:15 PM

## 2018-02-22 NOTE — ANESTHESIA POSTPROCEDURE EVALUATION
Patient: Patricia Collins    Procedure Summary     Date Anesthesia Start Anesthesia Stop Room / Location    02/21/18 1832 1850  [facility] / [facility]       Procedure Diagnosis Surgeon Provider    CYSTOSCOPY; URETHRAL DILATION (N/A Urethra) Urethral stenosis  (Urethral stenosis [N35.9]) MD Nima Reddy MD          Anesthesia Type: general  Last vitals  BP   142/95 (02/21/18 1754)   Temp   97.7 °F (36.5 °C) (02/21/18 1754)   Pulse   69 (02/21/18 1754)   Resp   18 (02/21/18 1754)     SpO2   96 % (02/21/18 1754)     Post Anesthesia Care and Evaluation    Patient location during evaluation: PACU  Patient participation: complete - patient participated  Level of consciousness: awake and alert  Pain score: 0  Pain management: adequate  Airway patency: patent  Anesthetic complications: No anesthetic complications  PONV Status: none  Cardiovascular status: acceptable  Respiratory status: acceptable  Hydration status: acceptable

## 2018-04-27 ENCOUNTER — OFFICE VISIT (OUTPATIENT)
Dept: FAMILY MEDICINE CLINIC | Facility: CLINIC | Age: 53
End: 2018-04-27

## 2018-04-27 VITALS
HEIGHT: 64 IN | WEIGHT: 137 LBS | BODY MASS INDEX: 23.39 KG/M2 | SYSTOLIC BLOOD PRESSURE: 130 MMHG | DIASTOLIC BLOOD PRESSURE: 90 MMHG

## 2018-04-27 DIAGNOSIS — T78.40XA ALLERGIC REACTION, INITIAL ENCOUNTER: ICD-10-CM

## 2018-04-27 DIAGNOSIS — F33.1 MODERATE EPISODE OF RECURRENT MAJOR DEPRESSIVE DISORDER (HCC): Primary | ICD-10-CM

## 2018-04-27 DIAGNOSIS — Z12.39 BREAST CANCER SCREENING: ICD-10-CM

## 2018-04-27 DIAGNOSIS — J44.1 CHRONIC OBSTRUCTIVE PULMONARY DISEASE WITH ACUTE EXACERBATION (HCC): ICD-10-CM

## 2018-04-27 PROCEDURE — 96372 THER/PROPH/DIAG INJ SC/IM: CPT | Performed by: FAMILY MEDICINE

## 2018-04-27 PROCEDURE — 99214 OFFICE O/P EST MOD 30 MIN: CPT | Performed by: FAMILY MEDICINE

## 2018-04-27 RX ORDER — NICOTINE 21 MG/24HR
1 PATCH, TRANSDERMAL 24 HOURS TRANSDERMAL EVERY 24 HOURS
Qty: 28 PATCH | Refills: 1 | Status: SHIPPED | OUTPATIENT
Start: 2018-04-27 | End: 2018-06-26

## 2018-04-27 RX ORDER — TRIAMCINOLONE ACETONIDE 40 MG/ML
80 INJECTION, SUSPENSION INTRA-ARTICULAR; INTRAMUSCULAR ONCE
Status: COMPLETED | OUTPATIENT
Start: 2018-04-27 | End: 2018-04-27

## 2018-04-27 RX ADMIN — TRIAMCINOLONE ACETONIDE 80 MG: 40 INJECTION, SUSPENSION INTRA-ARTICULAR; INTRAMUSCULAR at 12:37

## 2018-04-27 NOTE — PROGRESS NOTES
Subjective   Patricia Collins is a 52 y.o. female.     Depression   Visit Type: follow-up  Patient presents with the following symptoms: fatigue and shortness of breath.  Patient is not experiencing: anhedonia, chest pain, choking sensation, compulsions, confusion, decreased concentration, depressed mood, dizziness, dry mouth, excessive worry, feelings of hopelessness, feelings of worthlessness, hypersomnia, hyperventilation, impotence, insomnia, irritability, malaise, memory impairment, muscle tension, nausea, nervousness/anxiety, obsessions, palpitations, panic, psychomotor agitation, psychomotor retardation, restlessness, suicidal ideas, suicidal planning, thoughts of death, weight gain and weight loss.  Frequency of symptoms: occasionally   Severity: moderate   Sleep quality: fair  Nighttime awakenings: occasional  Compliance with medications:  %        COPD   This is a chronic problem. The current episode started more than 1 year ago. The problem occurs intermittently. The problem has been rapidly worsening. Associated symptoms include congestion, coughing, fatigue and a rash. Pertinent negatives include no abdominal pain, anorexia, arthralgias, change in bowel habit, chest pain, chills, diaphoresis, fever, headaches, joint swelling, myalgias, nausea, neck pain, numbness, sore throat, swollen glands, urinary symptoms, vertigo, visual change, vomiting or weakness. Nothing aggravates the symptoms. Treatments tried: albuterol. The treatment provided mild relief.   Rash   This is a chronic problem. The current episode started more than 1 month ago. The problem has been gradually improving since onset. Location: Lower ext. Associated symptoms include congestion, coughing, fatigue and shortness of breath. Pertinent negatives include no anorexia, fever, sore throat or vomiting.        Current Outpatient Prescriptions:   •  albuterol (PROVENTIL HFA;VENTOLIN HFA) 108 (90 BASE) MCG/ACT inhaler, Inhale 2 puffs  Every 6 (Six) Hours As Needed for Wheezing., Disp: 1 inhaler, Rfl: 11  •  fluticasone (FLONASE) 50 MCG/ACT nasal spray, 2 sprays into each nostril Daily., Disp: 1 bottle, Rfl: 0  •  Fluticasone Furoate-Vilanterol (BREO ELLIPTA) 200-25 MCG/INH aerosol powder , Inhale 1 application Daily. (Patient taking differently: Inhale 1 inhaler Daily.), Disp: 1 each, Rfl: 11  •  hydrochlorothiazide (HYDRODIURIL) 25 MG tablet, Take 1 tablet by mouth Daily., Disp: 90 tablet, Rfl: 3  •  ibuprofen (ADVIL,MOTRIN) 800 MG tablet, Take 1 tablet by mouth Every 6 (Six) Hours As Needed for Mild Pain ., Disp: 120 tablet, Rfl: 1  •  PARoxetine (PAXIL) 30 MG tablet, Take 1 tablet by mouth Every Morning., Disp: 30 tablet, Rfl: 3  •  simvastatin (ZOCOR) 40 MG tablet, Take 1 tablet by mouth Every Night., Disp: 90 tablet, Rfl: 3  •  traZODone (DESYREL) 50 MG tablet, Take 1 tablet by mouth Every Night., Disp: 30 tablet, Rfl: 3    The following portions of the patient's history were reviewed and updated as appropriate: allergies, current medications, past family history, past medical history, past social history, past surgical history and problem list.    Review of Systems   Constitutional: Positive for activity change and fatigue. Negative for appetite change, chills, diaphoresis, fever, irritability, unexpected weight change, weight gain and weight loss.   HENT: Positive for congestion. Negative for sore throat.    Respiratory: Positive for cough, shortness of breath and wheezing. Negative for choking.    Cardiovascular: Negative for chest pain, palpitations and leg swelling.   Gastrointestinal: Negative for abdominal pain, anorexia, change in bowel habit, nausea and vomiting.   Genitourinary: Negative for impotence.   Musculoskeletal: Negative for arthralgias, joint swelling, myalgias and neck pain.   Skin: Positive for rash.   Neurological: Negative for vertigo, weakness, numbness and headaches.   Psychiatric/Behavioral: Negative for confusion,  "decreased concentration, dysphoric mood, sleep disturbance and suicidal ideas. The patient is not nervous/anxious and does not have insomnia.        Objective    Vitals:    04/27/18 0923   BP: 130/90   Weight: 62.1 kg (137 lb)   Height: 162.6 cm (64\")       Physical Exam   Constitutional: She is oriented to person, place, and time. She appears well-developed and well-nourished. No distress.   Cardiovascular: Normal rate, regular rhythm and normal heart sounds.    No murmur heard.  No LE edema.   Pulmonary/Chest: Effort normal. No respiratory distress. She has wheezes.   Abdominal: Soft. Bowel sounds are normal. She exhibits no distension. There is no tenderness.   Neurological: She is alert and oriented to person, place, and time.   Skin: Rash noted.   Psychiatric: She has a normal mood and affect. Her behavior is normal. Judgment and thought content normal.   Nursing note and vitals reviewed.      Assessment/Plan   Problems Addressed this Visit        Respiratory    Chronic obstructive lung disease       Other    Moderate episode of recurrent major depressive disorder - Primary      Other Visit Diagnoses     Allergic reaction, initial encounter        Relevant Medications    triamcinolone acetonide (KENALOG-40) injection 80 mg (Completed)    Breast cancer screening        Relevant Orders    Mammo Screening Digital Tomosynthesis Bilateral With CAD        1.) Depression- She has been doing well with her medication. Will continue current medications.    2.) COPD-  She has been using her inhaler and needing more albuterol.  Doesn't need abx at this time. Will see if steroid helps with wheezing and rash  3.) Contact dermatitis-  Will try steroid. Topical helped a little but has cleared up surface rash.  4.) Mammogram ordered.   RTC in 1.5 months or sooner PRN           "

## 2018-06-26 ENCOUNTER — OFFICE VISIT (OUTPATIENT)
Dept: FAMILY MEDICINE CLINIC | Facility: CLINIC | Age: 53
End: 2018-06-26

## 2018-06-26 VITALS
DIASTOLIC BLOOD PRESSURE: 88 MMHG | SYSTOLIC BLOOD PRESSURE: 130 MMHG | HEIGHT: 64 IN | WEIGHT: 135 LBS | BODY MASS INDEX: 23.05 KG/M2

## 2018-06-26 DIAGNOSIS — G25.81 RESTLESS LEGS SYNDROME (RLS): ICD-10-CM

## 2018-06-26 DIAGNOSIS — F33.1 MODERATE EPISODE OF RECURRENT MAJOR DEPRESSIVE DISORDER (HCC): Primary | ICD-10-CM

## 2018-06-26 PROCEDURE — 99213 OFFICE O/P EST LOW 20 MIN: CPT | Performed by: FAMILY MEDICINE

## 2018-06-26 RX ORDER — IBUPROFEN 800 MG/1
800 TABLET ORAL EVERY 6 HOURS PRN
Qty: 120 TABLET | Refills: 1 | Status: SHIPPED | OUTPATIENT
Start: 2018-06-26 | End: 2018-11-15 | Stop reason: ALTCHOICE

## 2018-06-26 RX ORDER — PAROXETINE 30 MG/1
30 TABLET, FILM COATED ORAL EVERY MORNING
Qty: 30 TABLET | Refills: 3 | Status: SHIPPED | OUTPATIENT
Start: 2018-06-26 | End: 2018-11-12

## 2018-06-26 RX ORDER — BETAMETHASONE DIPROPIONATE 0.5 MG/G
OINTMENT TOPICAL
COMMUNITY
Start: 2018-06-21 | End: 2018-11-12 | Stop reason: SDUPTHER

## 2018-06-26 RX ORDER — ALBUTEROL SULFATE 90 UG/1
2 AEROSOL, METERED RESPIRATORY (INHALATION) EVERY 6 HOURS PRN
Qty: 1 INHALER | Refills: 11 | Status: SHIPPED | OUTPATIENT
Start: 2018-06-26 | End: 2018-11-12 | Stop reason: SDUPTHER

## 2018-06-26 RX ORDER — SIMVASTATIN 40 MG
40 TABLET ORAL NIGHTLY
Qty: 90 TABLET | Refills: 3 | Status: SHIPPED | OUTPATIENT
Start: 2018-06-26 | End: 2018-11-12

## 2018-06-26 RX ORDER — HYDROCHLOROTHIAZIDE 25 MG/1
25 TABLET ORAL DAILY
Qty: 90 TABLET | Refills: 3 | Status: SHIPPED | OUTPATIENT
Start: 2018-06-26 | End: 2018-11-12 | Stop reason: SDUPTHER

## 2018-06-26 RX ORDER — ROPINIROLE 0.25 MG/1
0.25 TABLET, FILM COATED ORAL NIGHTLY
Qty: 30 TABLET | Refills: 3 | Status: SHIPPED | OUTPATIENT
Start: 2018-06-26 | End: 2018-11-12 | Stop reason: SDUPTHER

## 2018-06-26 NOTE — PROGRESS NOTES
Subjective   Patricia Collins is a 53 y.o. female.     Depression   Visit Type: follow-up  Patient presents with the following symptoms: excessive worry, fatigue, irritability and nervousness/anxiety.  Patient is not experiencing: anhedonia, chest pain, choking sensation, compulsions, confusion, decreased concentration, depressed mood, dizziness, dry mouth, feelings of hopelessness, feelings of worthlessness, hypersomnia, hyperventilation, impotence, insomnia, malaise, memory impairment, muscle tension, nausea, obsessions, palpitations, panic, psychomotor agitation, psychomotor retardation, restlessness, shortness of breath, suicidal ideas, suicidal planning, thoughts of death, weight gain and weight loss.  Frequency of symptoms: occasionally   Severity: moderate   Sleep quality: fair  Nighttime awakenings: occasional  Compliance with medications:  %        She has been having issues with her legs bothering her at night. Only bothers her when she lays down. They hurt below her knees. Feels better if she gets up or kicks her legs. She says that it isn't every night and that it is a couple times a week.           Current Outpatient Prescriptions:   •  albuterol (PROVENTIL HFA;VENTOLIN HFA) 108 (90 BASE) MCG/ACT inhaler, Inhale 2 puffs Every 6 (Six) Hours As Needed for Wheezing., Disp: 1 inhaler, Rfl: 11  •  betamethasone, augmented, (DIPROLENE) 0.05 % ointment, , Disp: , Rfl:   •  fluticasone (FLONASE) 50 MCG/ACT nasal spray, 2 sprays into each nostril Daily., Disp: 1 bottle, Rfl: 0  •  Fluticasone Furoate-Vilanterol (BREO ELLIPTA) 200-25 MCG/INH aerosol powder , Inhale 1 application Daily. (Patient taking differently: Inhale 1 inhaler Daily.), Disp: 1 each, Rfl: 11  •  hydrochlorothiazide (HYDRODIURIL) 25 MG tablet, Take 1 tablet by mouth Daily., Disp: 90 tablet, Rfl: 3  •  ibuprofen (ADVIL,MOTRIN) 800 MG tablet, Take 1 tablet by mouth Every 6 (Six) Hours As Needed for Mild Pain ., Disp: 120 tablet, Rfl:  "1  •  PARoxetine (PAXIL) 30 MG tablet, Take 1 tablet by mouth Every Morning., Disp: 30 tablet, Rfl: 3  •  simvastatin (ZOCOR) 40 MG tablet, Take 1 tablet by mouth Every Night., Disp: 90 tablet, Rfl: 3  •  traZODone (DESYREL) 50 MG tablet, Take 1 tablet by mouth Every Night., Disp: 30 tablet, Rfl: 3    The following portions of the patient's history were reviewed and updated as appropriate: allergies, current medications, past family history, past medical history, past social history, past surgical history and problem list.    Review of Systems   Constitutional: Positive for irritability. Negative for activity change, appetite change, fatigue, unexpected weight change, weight gain and weight loss.   Eyes: Negative for visual disturbance.   Respiratory: Negative for cough, choking, shortness of breath and wheezing.    Cardiovascular: Negative for chest pain, palpitations and leg swelling.   Gastrointestinal: Negative for abdominal distention, abdominal pain, constipation, diarrhea, nausea and vomiting.   Genitourinary: Negative for impotence.   Musculoskeletal: Negative for arthralgias, back pain, gait problem and joint swelling.   Skin: Negative for pallor, rash and wound.   Neurological: Negative for headaches.   Psychiatric/Behavioral: Positive for sleep disturbance. Negative for confusion, decreased concentration, dysphoric mood and suicidal ideas. The patient is nervous/anxious. The patient does not have insomnia.        Objective    Vitals:    06/26/18 0904   BP: 130/88   Weight: 61.2 kg (135 lb)   Height: 162.6 cm (64\")       Physical Exam   Constitutional: She is oriented to person, place, and time. She appears well-developed and well-nourished. No distress.   Cardiovascular: Normal rate, regular rhythm and normal heart sounds.    No murmur heard.  No LE edema.   Pulmonary/Chest: Effort normal. No respiratory distress. She has no wheezes.   Abdominal: Soft. Bowel sounds are normal. She exhibits no distension. " "There is no tenderness.   Neurological: She is alert and oriented to person, place, and time.   Skin: Rash noted.   Psychiatric: She has a normal mood and affect. Her behavior is normal. Judgment and thought content normal.   Nursing note and vitals reviewed.      Assessment/Plan   Problems Addressed this Visit        Other    Moderate episode of recurrent major depressive disorder - Primary    Relevant Medications    PARoxetine (PAXIL) 30 MG tablet      Other Visit Diagnoses     Restless legs syndrome (RLS)            1.) Depression- Doing okay on current medications. She is still having some bad days but they are more manageable.  Continue paxil.  Stop trazodone at night. Has been making her \"feel funny\".  2.) RLS-  Will try low dose requip and see if that helps with her sleeping issues. May be that the RLS has been keeping her up at night. Would recommend checking her iron/ferritin levels next time she comes in with her other labs.  Will need to have A1C and lipids when she comes back in.  RTC in 3 months or sooner PRN             "

## 2018-11-12 ENCOUNTER — OFFICE VISIT (OUTPATIENT)
Dept: FAMILY MEDICINE CLINIC | Facility: CLINIC | Age: 53
End: 2018-11-12

## 2018-11-12 ENCOUNTER — APPOINTMENT (OUTPATIENT)
Dept: LAB | Facility: HOSPITAL | Age: 53
End: 2018-11-12

## 2018-11-12 VITALS
SYSTOLIC BLOOD PRESSURE: 130 MMHG | DIASTOLIC BLOOD PRESSURE: 88 MMHG | WEIGHT: 132.7 LBS | BODY MASS INDEX: 22.65 KG/M2 | HEIGHT: 64 IN

## 2018-11-12 DIAGNOSIS — M54.2 CHRONIC NECK PAIN: ICD-10-CM

## 2018-11-12 DIAGNOSIS — Z76.89 ENCOUNTER TO ESTABLISH CARE: ICD-10-CM

## 2018-11-12 DIAGNOSIS — G25.81 RESTLESS LEG SYNDROME: ICD-10-CM

## 2018-11-12 DIAGNOSIS — R05.9 COUGH: Primary | ICD-10-CM

## 2018-11-12 DIAGNOSIS — Z72.0 NICOTINE ABUSE: ICD-10-CM

## 2018-11-12 DIAGNOSIS — F33.1 MODERATE EPISODE OF RECURRENT MAJOR DEPRESSIVE DISORDER (HCC): ICD-10-CM

## 2018-11-12 DIAGNOSIS — Z13.220 SCREENING FOR HYPERCHOLESTEROLEMIA: ICD-10-CM

## 2018-11-12 DIAGNOSIS — Z13.29 SCREENING FOR HYPOTHYROIDISM: ICD-10-CM

## 2018-11-12 DIAGNOSIS — J44.1 CHRONIC OBSTRUCTIVE PULMONARY DISEASE WITH ACUTE EXACERBATION (HCC): ICD-10-CM

## 2018-11-12 DIAGNOSIS — G89.29 CHRONIC NECK PAIN: ICD-10-CM

## 2018-11-12 DIAGNOSIS — I10 BENIGN HYPERTENSION: ICD-10-CM

## 2018-11-12 LAB
ALBUMIN SERPL-MCNC: 5.1 G/DL (ref 3.4–4.8)
ALBUMIN/GLOB SERPL: 1.6 G/DL (ref 1.1–1.8)
ALP SERPL-CCNC: 100 U/L (ref 38–126)
ALT SERPL W P-5'-P-CCNC: 36 U/L (ref 9–52)
ANION GAP SERPL CALCULATED.3IONS-SCNC: 8 MMOL/L (ref 5–15)
ARTICHOKE IGE QN: 151 MG/DL (ref 1–129)
AST SERPL-CCNC: 46 U/L (ref 14–36)
BASOPHILS # BLD AUTO: 0.03 10*3/MM3 (ref 0–0.2)
BASOPHILS NFR BLD AUTO: 0.4 % (ref 0–2)
BILIRUB SERPL-MCNC: 0.8 MG/DL (ref 0.2–1.3)
BUN BLD-MCNC: 14 MG/DL (ref 7–21)
BUN/CREAT SERPL: 15.4 (ref 7–25)
CALCIUM SPEC-SCNC: 10 MG/DL (ref 8.4–10.2)
CHLORIDE SERPL-SCNC: 102 MMOL/L (ref 95–110)
CHOLEST SERPL-MCNC: 255 MG/DL (ref 0–199)
CO2 SERPL-SCNC: 27 MMOL/L (ref 22–31)
CREAT BLD-MCNC: 0.91 MG/DL (ref 0.5–1)
DEPRECATED RDW RBC AUTO: 39.5 FL (ref 36.4–46.3)
EOSINOPHIL # BLD AUTO: 0.27 10*3/MM3 (ref 0–0.7)
EOSINOPHIL NFR BLD AUTO: 3.6 % (ref 0–7)
ERYTHROCYTE [DISTWIDTH] IN BLOOD BY AUTOMATED COUNT: 11.7 % (ref 11.5–14.5)
GFR SERPL CREATININE-BSD FRML MDRD: 64 ML/MIN/1.73 (ref 51–120)
GLOBULIN UR ELPH-MCNC: 3.2 GM/DL (ref 2.3–3.5)
GLUCOSE BLD-MCNC: 107 MG/DL (ref 60–100)
HCT VFR BLD AUTO: 45.9 % (ref 35–45)
HDLC SERPL-MCNC: 52 MG/DL (ref 60–200)
HGB BLD-MCNC: 16.2 G/DL (ref 12–15.5)
IMM GRANULOCYTES # BLD: 0.01 10*3/MM3 (ref 0–0.02)
IMM GRANULOCYTES NFR BLD: 0.1 % (ref 0–0.5)
LDLC/HDLC SERPL: 3.13 {RATIO} (ref 0–3.22)
LYMPHOCYTES # BLD AUTO: 1.73 10*3/MM3 (ref 0.6–4.2)
LYMPHOCYTES NFR BLD AUTO: 23.1 % (ref 10–50)
MCH RBC QN AUTO: 32.6 PG (ref 26.5–34)
MCHC RBC AUTO-ENTMCNC: 35.3 G/DL (ref 31.4–36)
MCV RBC AUTO: 92.4 FL (ref 80–98)
MONOCYTES # BLD AUTO: 0.5 10*3/MM3 (ref 0–0.9)
MONOCYTES NFR BLD AUTO: 6.7 % (ref 0–12)
NEUTROPHILS # BLD AUTO: 4.96 10*3/MM3 (ref 2–8.6)
NEUTROPHILS NFR BLD AUTO: 66.1 % (ref 37–80)
PLATELET # BLD AUTO: 201 10*3/MM3 (ref 150–450)
PMV BLD AUTO: 10.6 FL (ref 8–12)
POTASSIUM BLD-SCNC: 4.2 MMOL/L (ref 3.5–5.1)
PROT SERPL-MCNC: 8.3 G/DL (ref 6.3–8.6)
RBC # BLD AUTO: 4.97 10*6/MM3 (ref 3.77–5.16)
SODIUM BLD-SCNC: 137 MMOL/L (ref 137–145)
TRIGL SERPL-MCNC: 201 MG/DL (ref 20–199)
TSH SERPL DL<=0.05 MIU/L-ACNC: 4.14 MIU/ML (ref 0.46–4.68)
WBC NRBC COR # BLD: 7.5 10*3/MM3 (ref 3.2–9.8)

## 2018-11-12 PROCEDURE — 80061 LIPID PANEL: CPT | Performed by: NURSE PRACTITIONER

## 2018-11-12 PROCEDURE — 80053 COMPREHEN METABOLIC PANEL: CPT | Performed by: NURSE PRACTITIONER

## 2018-11-12 PROCEDURE — 99406 BEHAV CHNG SMOKING 3-10 MIN: CPT | Performed by: NURSE PRACTITIONER

## 2018-11-12 PROCEDURE — 99214 OFFICE O/P EST MOD 30 MIN: CPT | Performed by: NURSE PRACTITIONER

## 2018-11-12 PROCEDURE — 85025 COMPLETE CBC W/AUTO DIFF WBC: CPT | Performed by: NURSE PRACTITIONER

## 2018-11-12 PROCEDURE — 84443 ASSAY THYROID STIM HORMONE: CPT | Performed by: NURSE PRACTITIONER

## 2018-11-12 RX ORDER — QUETIAPINE FUMARATE 50 MG/1
50 TABLET, FILM COATED ORAL NIGHTLY
Qty: 30 TABLET | Refills: 1 | Status: SHIPPED | OUTPATIENT
Start: 2018-11-12 | End: 2019-01-17 | Stop reason: SDUPTHER

## 2018-11-12 RX ORDER — HYDROCHLOROTHIAZIDE 25 MG/1
25 TABLET ORAL DAILY
Qty: 90 TABLET | Refills: 3 | Status: SHIPPED | OUTPATIENT
Start: 2018-11-12 | End: 2019-03-13 | Stop reason: SDUPTHER

## 2018-11-12 RX ORDER — BROMPHENIRAMINE MALEATE, PSEUDOEPHEDRINE HYDROCHLORIDE, AND DEXTROMETHORPHAN HYDROBROMIDE 2; 30; 10 MG/5ML; MG/5ML; MG/5ML
5 SYRUP ORAL 4 TIMES DAILY PRN
Qty: 500 ML | Refills: 0 | Status: SHIPPED | OUTPATIENT
Start: 2018-11-12 | End: 2018-12-05

## 2018-11-12 RX ORDER — ROPINIROLE 0.25 MG/1
0.25 TABLET, FILM COATED ORAL NIGHTLY
Qty: 30 TABLET | Refills: 3 | Status: SHIPPED | OUTPATIENT
Start: 2018-11-12 | End: 2019-03-13 | Stop reason: SDUPTHER

## 2018-11-12 RX ORDER — FLUTICASONE PROPIONATE 50 MCG
2 SPRAY, SUSPENSION (ML) NASAL DAILY
Qty: 1 BOTTLE | Refills: 0 | Status: SHIPPED | OUTPATIENT
Start: 2018-11-12 | End: 2019-05-21

## 2018-11-12 RX ORDER — BETAMETHASONE DIPROPIONATE 0.5 MG/G
OINTMENT TOPICAL DAILY
Qty: 50 G | Refills: 2 | Status: SHIPPED | OUTPATIENT
Start: 2018-11-12 | End: 2020-04-30 | Stop reason: SDUPTHER

## 2018-11-12 RX ORDER — ALBUTEROL SULFATE 90 UG/1
2 AEROSOL, METERED RESPIRATORY (INHALATION) EVERY 6 HOURS PRN
Qty: 1 INHALER | Refills: 11 | Status: SHIPPED | OUTPATIENT
Start: 2018-11-12 | End: 2019-10-28 | Stop reason: SDUPTHER

## 2018-11-12 NOTE — PROGRESS NOTES
"Subjective   Patricia Collins is a 53 y.o. female. Establish primary care.   Has history of depression and has been on Zoloft but \"it made me sick.\"  Has also tried Paxil in the past with little relief in the past.  \"Adrienne and her dad think I am bipolar.  I get so down some times for days or even weeks without even leaving the house.\"      Hypertension   This is a chronic problem. The current episode started more than 1 year ago. The problem is unchanged. The problem is controlled. Associated symptoms include anxiety and neck pain. Pertinent negatives include no peripheral edema or sweats. There are no associated agents to hypertension. Risk factors for coronary artery disease include stress, post-menopausal state and smoking/tobacco exposure. Current antihypertension treatment includes diuretics. The current treatment provides moderate improvement. There are no compliance problems.  There is no history of kidney disease, CAD/MI, CVA, heart failure, left ventricular hypertrophy or retinopathy.   Depression   Visit Type: follow-up  Patient presents with the following symptoms: fatigue, muscle tension, nervousness/anxiety and restlessness.  Patient is not experiencing: anhedonia, chest pain, compulsions, confusion, depressed mood, dizziness, dry mouth, excessive worry, feelings of hopelessness, feelings of worthlessness, hypersomnia, hyperventilation, malaise, memory impairment, nausea, obsessions, panic, psychomotor agitation, psychomotor retardation, suicidal ideas, suicidal planning and thoughts of death.  Frequency of symptoms: occasionally   Severity: moderate   Sleep quality: non-restorative  Nighttime awakenings: several  Compliance with medications:  %        Nicotine Dependence   Presents for initial visit. Symptoms are negative for fatigue. Preferred tobacco types include cigarettes. Preferred cigarette types include filtered. Preferred strength is regular. Preferred brands include [employer]. Her " urge triggers include company of smokers, meal time and stress. Her first smoke is from 6 to 8 AM. She smokes 1 pack of cigarettes per day. She started smoking when she was 13-14 years old. Past treatments include buproprion and nicotine patch. The treatment provided no relief. Compliance with prior treatments has been good. Patricia is thinking about quitting. Patricia has tried to quit 2 times.   Neck Pain    This is a chronic problem. The current episode started more than 1 year ago. The problem occurs constantly. The problem has been unchanged. The pain is associated with nothing. The pain is present in the midline. The quality of the pain is described as aching. The pain is at a severity of 8/10. The pain is moderate. The symptoms are aggravated by position. The pain is same all the time. Pertinent negatives include no fever. She has tried NSAIDs, neck support and bed rest for the symptoms. The treatment provided moderate relief.        The following portions of the patient's history were reviewed and updated as appropriate:     She  has a past medical history of Acute frontal sinusitis, Benign hypertension, Breast mass, Chronic obstructive lung disease (CMS/HCC), Cough, Depression, Diabetes mellitus (CMS/HCC), Hyperlipidemia, and Smokes.  She does not have any pertinent problems on file.  She  has a past surgical history that includes  section; Breast biopsy (Bilateral); and CYSTOSCOPY; URETHRAL DILATION (N/A, 2018).  Her family history includes Hypertension in her mother; Rectal cancer in her father.  She  reports that she has been smoking.  She has been smoking about 1.00 pack per day. she has never used smokeless tobacco. She reports that she drinks alcohol. She reports that she does not use drugs.  Current Outpatient Medications   Medication Sig Dispense Refill   • albuterol (PROVENTIL HFA;VENTOLIN HFA) 108 (90 Base) MCG/ACT inhaler Inhale 2 puffs Every 6 (Six) Hours As Needed for Wheezing. 1  inhaler 11   • betamethasone, augmented, (DIPROLENE) 0.05 % ointment Apply  topically to the appropriate area as directed Daily. 50 g 2   • fluticasone (FLONASE) 50 MCG/ACT nasal spray 2 sprays into the nostril(s) as directed by provider Daily. 1 bottle 0   • Fluticasone Furoate-Vilanterol (BREO ELLIPTA) 200-25 MCG/INH aerosol powder  Inhale 1 inhaler Daily. 1 each 11   • hydrochlorothiazide (HYDRODIURIL) 25 MG tablet Take 1 tablet by mouth Daily. 90 tablet 3   • ibuprofen (ADVIL,MOTRIN) 800 MG tablet Take 1 tablet by mouth Every 6 (Six) Hours As Needed for Mild Pain . 120 tablet 1   • rOPINIRole (REQUIP) 0.25 MG tablet Take 1 tablet by mouth Every Night. Take 1 hour before bedtime. 30 tablet 3   • brompheniramine-pseudoephedrine-DM 30-2-10 MG/5ML syrup Take 5 mL by mouth 4 (Four) Times a Day As Needed for Allergies. 500 mL 0   • QUEtiapine (SEROquel) 50 MG tablet Take 1 tablet by mouth Every Night. 30 tablet 1     No current facility-administered medications for this visit.      Current Outpatient Medications on File Prior to Visit   Medication Sig   • ibuprofen (ADVIL,MOTRIN) 800 MG tablet Take 1 tablet by mouth Every 6 (Six) Hours As Needed for Mild Pain .   • [DISCONTINUED] albuterol (PROVENTIL HFA;VENTOLIN HFA) 108 (90 Base) MCG/ACT inhaler Inhale 2 puffs Every 6 (Six) Hours As Needed for Wheezing.   • [DISCONTINUED] betamethasone, augmented, (DIPROLENE) 0.05 % ointment    • [DISCONTINUED] fluticasone (FLONASE) 50 MCG/ACT nasal spray 2 sprays into each nostril Daily.   • [DISCONTINUED] Fluticasone Furoate-Vilanterol (BREO ELLIPTA) 200-25 MCG/INH aerosol powder  Inhale 1 inhaler Daily.   • [DISCONTINUED] hydrochlorothiazide (HYDRODIURIL) 25 MG tablet Take 1 tablet by mouth Daily.   • [DISCONTINUED] rOPINIRole (REQUIP) 0.25 MG tablet Take 1 tablet by mouth Every Night. Take 1 hour before bedtime.   • [DISCONTINUED] PARoxetine (PAXIL) 30 MG tablet Take 1 tablet by mouth Every Morning.   • [DISCONTINUED]  simvastatin (ZOCOR) 40 MG tablet Take 1 tablet by mouth Every Night.     No current facility-administered medications on file prior to visit.      She is allergic to codeine..    Review of Systems   Constitutional: Negative.  Negative for chills, diaphoresis, fatigue and fever.   Eyes: Negative.    Respiratory: Negative.    Cardiovascular: Negative.    Gastrointestinal: Negative.    Genitourinary: Negative.    Musculoskeletal: Positive for neck pain.   Skin: Negative.    Neurological: Negative.    Psychiatric/Behavioral: Positive for dysphoric mood. Negative for confusion, sleep disturbance and suicidal ideas. The patient is nervous/anxious.        Objective   Physical Exam   Constitutional: She is oriented to person, place, and time. She appears well-developed and well-nourished.   HENT:   Head: Normocephalic and atraumatic.   Right Ear: External ear normal.   Left Ear: External ear normal.   Nose: Nose normal.   Mouth/Throat: Oropharynx is clear and moist.   Eyes: Conjunctivae and EOM are normal. Pupils are equal, round, and reactive to light.   Neck: Normal range of motion. Neck supple.   Cardiovascular: Normal rate, regular rhythm and normal heart sounds.   Pulmonary/Chest: Effort normal and breath sounds normal.   Abdominal: Soft. Bowel sounds are normal.   Musculoskeletal: Normal range of motion.   Neurological: She is alert and oriented to person, place, and time.   Skin: Skin is warm. Capillary refill takes less than 2 seconds.   Psychiatric: She has a normal mood and affect. Her behavior is normal.   Nursing note and vitals reviewed.      Assessment/Plan   Problems Addressed this Visit        Cardiovascular and Mediastinum    Benign hypertension    Relevant Medications    hydrochlorothiazide (HYDRODIURIL) 25 MG tablet    Other Relevant Orders    CBC & Differential    Comprehensive Metabolic Panel    CBC Auto Differential       Respiratory    Chronic obstructive lung disease (CMS/HCC)    Relevant Medications     brompheniramine-pseudoephedrine-DM 30-2-10 MG/5ML syrup    albuterol (PROVENTIL HFA;VENTOLIN HFA) 108 (90 Base) MCG/ACT inhaler    fluticasone (FLONASE) 50 MCG/ACT nasal spray    Fluticasone Furoate-Vilanterol (BREO ELLIPTA) 200-25 MCG/INH aerosol powder        Other    Moderate episode of recurrent major depressive disorder (CMS/HCC)    Relevant Medications    QUEtiapine (SEROquel) 50 MG tablet      Other Visit Diagnoses     Cough    -  Primary    Relevant Medications    brompheniramine-pseudoephedrine-DM 30-2-10 MG/5ML syrup    fluticasone (FLONASE) 50 MCG/ACT nasal spray    Restless leg syndrome        Relevant Medications    rOPINIRole (REQUIP) 0.25 MG tablet    Nicotine abuse        Chronic neck pain        Relevant Orders    XR Spine Cervical 2 or 3 View    Screening for hypothyroidism        Relevant Orders    TSH    Screening for hypercholesterolemia        Relevant Orders    Lipid panel    Encounter to establish care            1.  Benign hypertension:  Continue on HCTZ as previously prescribed  Reduce sodium intake to no more than 2009 g per day    2.  COPD:  Continue on albuterol every oh as previously prescribed and refill prescription sent to pharmacy  Strongly encouraged smoking cessation at this time  Encouraged increase fluids to help thin secretions    3.  Moderate episode of recurrent major depressive disorder:  Begin Seroquel as prescribed be taken 1 by mouth daily at bedtime  Educated on possible side effects of this medication including but not limited to increased risk for sedation  Educated on possible side of this medication including but not limited possible suicidal ideations  Encouraged to discontinue medication immediately if suicidal ideations occur and seek emergency medical treatment    4.  Cough:  Begin Bromfed-DM as prescribed be taken 1 teaspoon every 6 hours when necessary for cough  Encouraged increase fluids to help thin secretions  Continue on Flonase as previously prescribed  and refill prescription sent to pharmacy    5.  Restless leg syndrome:  Continue on Requip as previously prescribed a refill prescription sent to pharmacy    6.  Nicotine abuse:  I advised Patricia of the risks of continuing to use tobacco, and I provided her with tobacco cessation educational materials in the After Visit Summary.   During this visit, I spent 5 minutes counseling the patient regarding tobacco cessation.  Declined Chantix and Wellbutrin at this time     7.  Chronic neck pain:  Complete cervical spine x-ray is ordered and will notify of results when available    8.  Screening for hypothyroidism:  Complete TSH as ordered and will notify of results when available    9.  Screening for hypercholesterolemia:  Complete fasting lipid panel as ordered and will notify of results when available    9.  Encounter to establish care:  Continue on current medications as previously prescribed   Schedule follow-up appointment with this office in 1 month for recheck of depression or sooner as needed        This document has been electronically signed by JAI Mccurdy on November 12, 2018 10:17 AM

## 2018-11-13 ENCOUNTER — TELEPHONE (OUTPATIENT)
Dept: FAMILY MEDICINE CLINIC | Facility: CLINIC | Age: 53
End: 2018-11-13

## 2018-11-13 DIAGNOSIS — M54.2 CERVICAL PAIN: ICD-10-CM

## 2018-11-13 DIAGNOSIS — E78.2 MIXED HYPERLIPIDEMIA: Primary | ICD-10-CM

## 2018-11-13 RX ORDER — ATORVASTATIN CALCIUM 10 MG/1
10 TABLET, FILM COATED ORAL DAILY
Qty: 30 TABLET | Refills: 3 | Status: SHIPPED | OUTPATIENT
Start: 2018-11-13 | End: 2019-03-13 | Stop reason: SDUPTHER

## 2018-11-13 NOTE — TELEPHONE ENCOUNTER
-Per JAI Bruner, Ms. Collins has been called with her recent lab results & recommendations.  Continue her current medications and follow-up as planned or sooner if any problems.        ---- Message from JAI Mccurdy sent at 11/13/2018  9:50 AM CST -----  Please call and let her know that her lab work came back and her cholesterol and triglycerides were both elevated as well as her pad cholesterol.  This increases her risk from her stroke and it is from a year ago when she had it checked.  I need to place her on Lipitor for her cholesterol.  She is to take this medication at night.  I have sent a prescription to her pharmacy.  She also needs to adhere to low-fat diet and increase her exercise.  The x-ray of her neck showed some degenerative (osteoarthritis) so I have sent in a referral to orthopedics for further evaluation.  If she does not wish to see orthopedics at that time she can certainly try some Tylenol arthritis over-the-counter to see if that helps with pain.  Thank you.

## 2018-11-13 NOTE — PROGRESS NOTES
Per AJI Bruner, Ms. Collins has been called with her recent lab results & recommendations.  Continue her current medications and follow-up as planned or sooner if any problems.

## 2018-11-15 ENCOUNTER — OFFICE VISIT (OUTPATIENT)
Dept: ORTHOPEDIC SURGERY | Facility: CLINIC | Age: 53
End: 2018-11-15

## 2018-11-15 VITALS — BODY MASS INDEX: 22.53 KG/M2 | HEIGHT: 64 IN | WEIGHT: 132 LBS

## 2018-11-15 DIAGNOSIS — M50.30 DEGENERATIVE DISC DISEASE, CERVICAL: ICD-10-CM

## 2018-11-15 DIAGNOSIS — M47.812 SPONDYLOSIS OF CERVICAL REGION WITHOUT MYELOPATHY OR RADICULOPATHY: ICD-10-CM

## 2018-11-15 DIAGNOSIS — M54.6 CHRONIC MIDLINE THORACIC BACK PAIN: ICD-10-CM

## 2018-11-15 DIAGNOSIS — G89.29 CHRONIC MIDLINE THORACIC BACK PAIN: ICD-10-CM

## 2018-11-15 DIAGNOSIS — M54.2 CERVICAL PAIN (NECK): Primary | ICD-10-CM

## 2018-11-15 PROCEDURE — 99214 OFFICE O/P EST MOD 30 MIN: CPT | Performed by: NURSE PRACTITIONER

## 2018-11-15 NOTE — PROGRESS NOTES
Patricia Collins is a 53 y.o. female   Primary provider:  Georgina Mcrae APRN       Chief Complaint   Patient presents with   • Cervical Spine - Pain     Xray Judaism 11/12/18       HISTORY OF PRESENT ILLNESS:    53-year-old female patient presents to office for evaluation of chronic, left-sided neck pain and chronic pain in the midthoracic spine.  Onset occurred about 6-8 months ago.  She denies any known injury.  Patient was evaluated by her primary care provider, JAI Bruner on 11/12/2018 with x-rays done of the cervical spine.  Patient takes an occasional Ibuprofen as needed for pain.  Patient complains of pain that awakens her during sleep and severe stiffness in her neck upon waking in the mornings.  Patient reports she had an episode of numbness and tingling in her bilateral arms recently resolved.  Patient denies any pain that radiates down her arms.  Pain is described as constant and moderate in severity.  Pain is described as grinding and aching in nature.  She states she has increased pain in her mid thoracic spine with standing and walking and that it begins to radiate up into her neck.  She also has increased left-sided neck pain with range of motion of the neck.  She has limitations with turning her head.  Pain improves mildly with rest and Ibuprofen.      Pain   This is a chronic problem. Episode onset: 6-8 months ago. The problem occurs constantly. The problem has been gradually worsening. Associated symptoms include arthralgias, coughing, neck pain and numbness. Associated symptoms comments: Grinding, aching. The symptoms are aggravated by standing and walking (movement of the neck). She has tried rest, NSAIDs and acetaminophen for the symptoms. The treatment provided mild relief.        CONCURRENT MEDICAL HISTORY:    Past Medical History:   Diagnosis Date   • Acute frontal sinusitis    • Benign hypertension    • Breast mass     left breast, right breast with questionable node   • Chronic  obstructive lung disease (CMS/[facility])    • Cough     likely secondary to chronic smoking   • Depression    • Diabetes mellitus (CMS/[facility])     diet controlled   • Hyperlipidemia    • Smokes        Allergies   Allergen Reactions   • Codeine Nausea And Vomiting         Current Outpatient Medications:   •  albuterol (PROVENTIL HFA;VENTOLIN HFA) 108 (90 Base) MCG/ACT inhaler, Inhale 2 puffs Every 6 (Six) Hours As Needed for Wheezing., Disp: 1 inhaler, Rfl: 11  •  atorvastatin (LIPITOR) 10 MG tablet, Take 1 tablet by mouth Daily., Disp: 30 tablet, Rfl: 3  •  betamethasone, augmented, (DIPROLENE) 0.05 % ointment, Apply  topically to the appropriate area as directed Daily., Disp: 50 g, Rfl: 2  •  brompheniramine-pseudoephedrine-DM 30-2-10 MG/5ML syrup, Take 5 mL by mouth 4 (Four) Times a Day As Needed for Allergies., Disp: 500 mL, Rfl: 0  •  fluticasone (FLONASE) 50 MCG/ACT nasal spray, 2 sprays into the nostril(s) as directed by provider Daily., Disp: 1 bottle, Rfl: 0  •  Fluticasone Furoate-Vilanterol (BREO ELLIPTA) 200-25 MCG/INH aerosol powder , Inhale 1 inhaler Daily., Disp: 1 each, Rfl: 11  •  hydrochlorothiazide (HYDRODIURIL) 25 MG tablet, Take 1 tablet by mouth Daily., Disp: 90 tablet, Rfl: 3  •  QUEtiapine (SEROquel) 50 MG tablet, Take 1 tablet by mouth Every Night., Disp: 30 tablet, Rfl: 1  •  rOPINIRole (REQUIP) 0.25 MG tablet, Take 1 tablet by mouth Every Night. Take 1 hour before bedtime., Disp: 30 tablet, Rfl: 3  •  diclofenac sodium (VOTAREN XR) 100 MG 24 hr tablet, Take 1 tablet by mouth Daily., Disp: 30 tablet, Rfl: 3    Past Surgical History:   Procedure Laterality Date   • BREAST BIOPSY Bilateral    •  SECTION         Family History   Problem Relation Age of Onset   • Hypertension Mother    • Rectal cancer Father         Social History     Socioeconomic History   • Marital status:      Spouse name: Not on file   • Number of children: Not on file   • Years of education: Not on file   • Highest  "education level: Not on file   Social Needs   • Financial resource strain: Not on file   • Food insecurity - worry: Not on file   • Food insecurity - inability: Not on file   • Transportation needs - medical: Not on file   • Transportation needs - non-medical: Not on file   Occupational History   • Not on file   Tobacco Use   • Smoking status: Current Every Day Smoker     Packs/day: 1.00   • Smokeless tobacco: Never Used   Substance and Sexual Activity   • Alcohol use: Yes     Comment: socially   • Drug use: No   • Sexual activity: Defer   Other Topics Concern   • Not on file   Social History Narrative   • Not on file        Review of Systems   Constitutional: Negative.    HENT: Negative.    Eyes: Negative.    Respiratory: Positive for cough.    Cardiovascular: Negative.    Gastrointestinal: Negative.    Endocrine: Negative.    Genitourinary: Negative.    Musculoskeletal: Positive for arthralgias, back pain and neck pain.   Skin: Negative.    Allergic/Immunologic: Negative.    Neurological: Positive for dizziness and numbness.        Tingling   Hematological: Bruises/bleeds easily.   Psychiatric/Behavioral: Positive for sleep disturbance. The patient is nervous/anxious.        PHYSICAL EXAMINATION:       Ht 162.6 cm (64\")   Wt 59.9 kg (132 lb)   LMP  (LMP Unknown)   BMI 22.66 kg/m²     Physical Exam   Constitutional: She is oriented to person, place, and time. Vital signs are normal. She appears well-developed and well-nourished. She is active and cooperative. She does not appear ill. No distress.   HENT:   Head: Normocephalic.   Pulmonary/Chest: Effort normal. No respiratory distress.   Abdominal: Soft. She exhibits no distension.   Musculoskeletal: She exhibits tenderness (Cervical spine (left), Thoracic spine). She exhibits no edema or deformity.   Neurological: She is alert and oriented to person, place, and time. GCS eye subscore is 4. GCS verbal subscore is 5. GCS motor subscore is 6.   Skin: Skin is warm, " dry and intact. Capillary refill takes less than 2 seconds. No erythema.   Psychiatric: She has a normal mood and affect. Her speech is normal and behavior is normal. Judgment and thought content normal. Cognition and memory are normal.   Vitals reviewed.      GAIT:     [x]  Normal  []  Antalgic    Assistive device: [x]  None  []  Walker     []  Crutches  []  Cane     []  Wheelchair  []  Stretcher    Back Exam     Tenderness   The patient is experiencing tenderness in the thoracic and cervical.    Range of Motion   Extension: abnormal   Flexion: abnormal   Rotation right: abnormal   Rotation left: abnormal     Muscle Strength   The patient has normal back strength.    Tests   Straight leg raise right: negative  Straight leg raise left: negative    Reflexes   Patellar: normal    Other   Sensation: normal  Gait: normal     Comments:  Mild pain and limitations with range of motion.  No focal neurological deficits noted.            Xr Spine Cervical 2 Or 3 View    Result Date: 11/12/2018  Narrative: PROCEDURE: Cervical spine, three views. INDICATION: chronic neck pain, M54.2 Cervicalgia G89.29 Other chronic pain COMPARISON: None. AP, lateral and odontoid views. Prevertebral soft tissues normal. Straightening of the cervical lordotic curvature. Cervical vertebrae are normal in height with normal alignment with no subluxation. Moderate anterior hypertrophic spurring C4-5, C5-6 and C6-7. Moderate C5-6 and mild C4-5 and C6-7 degenerative disc change. The articular facets normally aligned. The spinous processes and odontoid intact.     Impression: Moderate hypertrophic degenerative changes C4-C7. Moderate C5-6 and mild C4-5 and C6-7 degenerative disc change. [id number] Electronically signed by:  Maxx Chavez MD  11/12/2018 1:15 PM [facility] Workstation: [id number]    Xr Spine Thoracic 2 View    Result Date: 11/16/2018  Narrative: Patient Name:  JOSH WING Patient ID:  [id number] Ordering:  ARNAV GRAY Attending:   ARNAV GRAY Referring:  ARNAV GRAY ------------------------------------------------ EXAMINATION:  Back pain. INDICATION: Neck pain. COMPARISON : None available VIEWS:   2 Images FINDINGS:  The thoracic spine shows mild scoliosis with convexity towards the right with approximate Patterson angle of 10 degrees from upper border of T4 to the lower border of T11. No hemivertebra seen. No acute bony abnormality identified. Intervertebral disc spaces are maintained.         Impression: CONCLUSION:  1.  Mild scoliosis of the thoracic spine with convexity towards the right as described. No other significant bony abnormality seen. Electronically signed by:  Jermaine Majano  11/16/2018 1:12 PM [employer] Workstation: [id number]    ASSESSMENT:    Diagnoses and all orders for this visit:    Cervical pain (neck)  -     XR Spine Thoracic 2 View  -     MRI Cervical Spine Without Contrast; Future    Degenerative disc disease, cervical  -     XR Spine Thoracic 2 View  -     MRI Cervical Spine Without Contrast; Future    Spondylosis of cervical region without myelopathy or radiculopathy  -     XR Spine Thoracic 2 View  -     MRI Cervical Spine Without Contrast; Future    Chronic midline thoracic back pain  -     MRI Thoracic Spine Without Contrast; Future    Other orders  -     diclofenac sodium (VOTAREN XR) 100 MG 24 hr tablet; Take 1 tablet by mouth Daily.    PLAN    X-rays of cervical spine done on 10/12/2018 are reviewed today.  X-rays of the thoracic spine are performed in office and reviewed with no acute findings noted.  Patient complains of mid thoracic spine pain and chronic, left-sided neck pain for the past 6-8 months that has continued to progressively worsen.  No complaints of radicular pain.  Patient does report some recent episodes of intermittent numbness and tingling in her arms.  Patient complains of activity intolerance and increased pain with longer episodes of standing and walking.  Recommend MRI of thoracic spine and  cervical spine to evaluate for depression fracture, disc herniation and/or nerve compression.  Recommend starting an oral prescription NSAID for more consistent dosing.  Voltaren is prescribed today to take daily.  Recommend rest and activity modification as tolerated and based on her pain with avoidance of heavy lifting, pulling or tugging.  Recommend use of ice and/or heat therapy to the neck and back as needed to minimize pain.  Follow-up after MRIs.    Return for follow up after MRIs.      This document has been electronically signed by JAI Saba on November 16, 2018 1:39 PM      JAI Saba

## 2018-11-27 ENCOUNTER — HOSPITAL ENCOUNTER (OUTPATIENT)
Dept: MRI IMAGING | Facility: HOSPITAL | Age: 53
Discharge: HOME | End: 2018-11-27
Admitting: NURSE PRACTITIONER

## 2018-11-27 ENCOUNTER — HOSPITAL ENCOUNTER (OUTPATIENT)
Dept: MRI IMAGING | Facility: HOSPITAL | Age: 53
Discharge: HOME | End: 2018-11-27

## 2018-11-27 DIAGNOSIS — M47.812 SPONDYLOSIS OF CERVICAL REGION WITHOUT MYELOPATHY OR RADICULOPATHY: ICD-10-CM

## 2018-11-27 DIAGNOSIS — M54.6 CHRONIC MIDLINE THORACIC BACK PAIN: ICD-10-CM

## 2018-11-27 DIAGNOSIS — M54.2 CERVICAL PAIN (NECK): ICD-10-CM

## 2018-11-27 DIAGNOSIS — G89.29 CHRONIC MIDLINE THORACIC BACK PAIN: ICD-10-CM

## 2018-11-27 DIAGNOSIS — M50.30 DEGENERATIVE DISC DISEASE, CERVICAL: ICD-10-CM

## 2018-11-27 PROCEDURE — 72141 MRI NECK SPINE W/O DYE: CPT

## 2018-11-27 PROCEDURE — 72146 MRI CHEST SPINE W/O DYE: CPT

## 2018-12-05 ENCOUNTER — OFFICE VISIT (OUTPATIENT)
Dept: ORTHOPEDIC SURGERY | Facility: CLINIC | Age: 53
End: 2018-12-05

## 2018-12-05 VITALS — WEIGHT: 132 LBS | BODY MASS INDEX: 22.53 KG/M2 | HEIGHT: 64 IN

## 2018-12-05 DIAGNOSIS — G89.29 CHRONIC MIDLINE THORACIC BACK PAIN: ICD-10-CM

## 2018-12-05 DIAGNOSIS — M54.2 CERVICAL PAIN (NECK): Primary | ICD-10-CM

## 2018-12-05 DIAGNOSIS — M50.30 DEGENERATIVE DISC DISEASE, CERVICAL: ICD-10-CM

## 2018-12-05 DIAGNOSIS — M47.812 SPONDYLOSIS OF CERVICAL REGION WITHOUT MYELOPATHY OR RADICULOPATHY: ICD-10-CM

## 2018-12-05 DIAGNOSIS — M54.6 CHRONIC MIDLINE THORACIC BACK PAIN: ICD-10-CM

## 2018-12-05 PROCEDURE — 99214 OFFICE O/P EST MOD 30 MIN: CPT | Performed by: ORTHOPAEDIC SURGERY

## 2018-12-05 RX ORDER — TRAMADOL HYDROCHLORIDE 50 MG/1
50 TABLET ORAL EVERY 6 HOURS PRN
Qty: 60 TABLET | Refills: 0 | Status: SHIPPED | OUTPATIENT
Start: 2018-12-05 | End: 2019-01-10

## 2018-12-05 NOTE — PROGRESS NOTES
Patricia Collins is a 53 y.o. female returns for     Chief Complaint   Patient presents with   • Cervical Spine - Follow-up   • Thoracic Spine - Follow-up   • Results     MRI       HISTORY OF PRESENT ILLNESS: Patient being seen for cervical and thoracic follow up. MRI done at , would like to discuss findings.   53 y pain of the back several months.  The pain is dull.  Does not radiate.  Intermittent.  Gradual.  Ibuprofen makes the pain better, walking makes the pain worse.  Numbness and tingling occurs 'a couple of times'  No fevers, no chills.  Occasional nighttime awakening pain.       CONCURRENT MEDICAL HISTORY:    The following portions of the patient's history were reviewed and updated as appropriate: allergies, current medications, past family history, past medical history, past social history, past surgical history and problem list.     ROS  No fevers or chills.  No chest pain or shortness of air.  No GI or  disturbances.  Past Medical History:   Diagnosis Date   • Acute frontal sinusitis    • Benign hypertension    • Breast mass     left breast, right breast with questionable node   • Chronic obstructive lung disease (CMS/HCC)    • Cough     likely secondary to chronic smoking   • Depression    • Diabetes mellitus (CMS/HCC)     diet controlled   • Hyperlipidemia    • Smokes      Past Surgical History:   Procedure Laterality Date   • BREAST BIOPSY Bilateral    •  SECTION       Social History     Socioeconomic History   • Marital status:      Spouse name: Not on file   • Number of children: Not on file   • Years of education: Not on file   • Highest education level: Not on file   Social Needs   • Financial resource strain: Not on file   • Food insecurity - worry: Not on file   • Food insecurity - inability: Not on file   • Transportation needs - medical: Not on file   • Transportation needs - non-medical: Not on file   Occupational History   • Not on file   Tobacco Use   • Smoking  "status: Current Every Day Smoker     Packs/day: 1.00     Types: Cigarettes   • Smokeless tobacco: Never Used   Substance and Sexual Activity   • Alcohol use: Yes     Comment: socially   • Drug use: No   • Sexual activity: Defer   Other Topics Concern   • Not on file   Social History Narrative   • Not on file     Family History   Problem Relation Age of Onset   • Hypertension Mother    • Rectal cancer Father          PHYSICAL EXAMINATION:       Ht 162.6 cm (64\")   Wt 59.9 kg (132 lb)   BMI 22.66 kg/m²     Physical Exam   Constitutional: She is oriented to person, place, and time. She appears well-developed and well-nourished. No distress.   HENT:   Head: Normocephalic.   Mouth/Throat: No oropharyngeal exudate.   Eyes: Conjunctivae are normal. Pupils are equal, round, and reactive to light.   Neck: No JVD present. No tracheal deviation present. No thyromegaly present.   Cardiovascular: Normal rate and intact distal pulses.   No murmur heard.  Pulmonary/Chest: Effort normal. No stridor. No respiratory distress. She has no wheezes.   Abdominal: Soft. She exhibits no distension. There is no tenderness. There is no guarding.   Neurological: She is alert and oriented to person, place, and time. She displays normal reflexes. No cranial nerve deficit. Coordination normal.   Skin: Skin is warm and dry. Capillary refill takes less than 2 seconds. No rash noted. No erythema.   Psychiatric: She has a normal mood and affect. Her behavior is normal. Thought content normal.       GAIT:     [x]  Normal  []  Antalgic    Assistive device: [x]  None  []  Walker     []  Crutches  []  Cane     []  Wheelchair  []  Stretcher    Ortho Exam  Cervical alignment is good  Cervical flexion 30  Extension 10  Lateral rotation 20 right and left  5/5 right and left deltoid bicep tricep wrist flexor and extensor hand .  Ambulating normally.      Xr Spine Cervical 2 Or 3 View    Result Date: 11/12/2018  Narrative: PROCEDURE: Cervical spine, " three views. INDICATION: chronic neck pain, M54.2 Cervicalgia G89.29 Other chronic pain COMPARISON: None. AP, lateral and odontoid views. Prevertebral soft tissues normal. Straightening of the cervical lordotic curvature. Cervical vertebrae are normal in height with normal alignment with no subluxation. Moderate anterior hypertrophic spurring C4-5, C5-6 and C6-7. Moderate C5-6 and mild C4-5 and C6-7 degenerative disc change. The articular facets normally aligned. The spinous processes and odontoid intact.     Impression: Moderate hypertrophic degenerative changes C4-C7. Moderate C5-6 and mild C4-5 and C6-7 degenerative disc change. [id number] Electronically signed by:  Maxx Chavez MD  11/12/2018 1:15 PM CST Workstation: [id number]    Xr Spine Thoracic 2 View    Result Date: 11/16/2018  Narrative: Patient Name:  JOSH WING Patient ID:  [id number] Ordering:  ARNAV GRAY Attending:  ARNAV GRAY Referring:  ARNAV GRAY ------------------------------------------------ EXAMINATION:  Back pain. INDICATION: Neck pain. COMPARISON : None available VIEWS:   2 Images FINDINGS:  The thoracic spine shows mild scoliosis with convexity towards the right with approximate Patterson angle of 10 degrees from upper border of T4 to the lower border of T11. No hemivertebra seen. No acute bony abnormality identified. Intervertebral disc spaces are maintained.         Impression: CONCLUSION:  1.  Mild scoliosis of the thoracic spine with convexity towards the right as described. No other significant bony abnormality seen. Electronically signed by:  Jermaine Majano  11/16/2018 1:12 PM CST Workstation: [id number]    Mri Cervical Spine Without Contrast    Result Date: 11/27/2018  Narrative: Procedure: MR cervical spine without contrast Reason for exam: Neck pain FINDINGS: Multisequence multiplanar MR imaging of the cervical spine was performed without IV contrast administration. Exam is limited secondary to patient motion during  exam. Cervical spine vertebral body heights and alignment are normal. There is no evidence of fracture or dislocation. No abnormal marrow signal. The cervical spinal cord is normal. C2-C3: Disc space normal. No disc protrusion or extrusion. Spinal cord and nerve roots exit without compromise. C3-C4: Mild posterior central broad-based disc protrusion mildly indenting the anterior thecal sac. Spinal cord and nerve roots exit without compromise. C4-C5: Disc space normal. No disc protrusion or extrusion. Spinal cord and nerve roots exit without compromise. C5-C6: Posterior central broad-based mild disc protrusion with overlying osteophytosis. The mild disc protrusion osteophytosis is mildly indenting the anterior thecal sac. Spinal cord and nerve roots appear to exit without compromise. C6-C7: Mild diffuse disc protrusion minimally indenting the anterior thecal sac. Spinal cord and nerve roots exit without compromise. C7-T1: Disc space normal. No disc protrusion or extrusion. Spinal cord and nerve roots exit without compromise.     Impression: 1.  No acute MR cervical spine abnormality. 2.  C3-C4: Mild posterior central broad-based disc protrusion mildly indenting the anterior thecal sac. Spinal cord and nerve roots exit without compromise. 3.  C5-C6: Posterior central broad-based mild disc protrusion with overlying osteophytosis. The mild disc protrusion osteophytosis is mildly indenting the anterior thecal sac. Spinal cord and nerve roots appear to exit without compromise. 4.  C6-C7: Mild diffuse disc protrusion minimally indenting the anterior thecal sac. Spinal cord and nerve roots exit without compromise. Electronically signed by:  Travis Carreon MD  11/27/2018 11:10 AM [facility] Workstation: [id number]    Mri Thoracic Spine Without Contrast    Result Date: 11/27/2018  Narrative: EXAM DESCRIPTION: MRI THORACIC SPINE WO CONTRAST CLINICAL HISTORY: Abnormal xray, thoracic spine, DJD, M54.6 Pain in thoracic spine G89.29 Other  chronic pain . History from technologist includes cervical and thoracic spine pain with bilateral arm and left leg numbness. COMPARISON: Thoracic spine radiograph 11/15/2018 TECHNIQUE: Noncontrast multisequence multiplanar MR imaging of the thoracic spine are obtained. FINDINGS: No incidental acute or suspicious findings within the paraspinal soft tissues. There is dextrocurvature of the thoracic spine. The alignment however is anatomic without subluxation deformity. No evidence of compression fracture or suspicious marrow replacement. There are intraosseous hemangiomas noted within T8, T10, T12 and L1. No suspicious lesion. Normal signal and morphology of the thoracic cord. No syrinx or suspicious signal alteration. No evidence of significant disc bulge, protrusion, central spinal canal, or foraminal stenosis. There is note of perineural cyst on the right at T9-10 and T10-11 and on the left at T11-12 and T12-L1.     Impression: Mild dextroscoliotic curvature of the thoracic spine. No compression fracture, subluxation, or suspicious osseous lesion. There is multilevel intraosseous hemangiomas. No syrinx or myelopathic signal alteration within the thoracic cord. No significant bulge, protrusion/extrusion, or stenosis. Electronically signed by:  Morgan Zamarripa MD  11/27/2018 11:07 AM CST Workstation: [id number]      Xray cervical spine- disc height loss at C4-5 and C5-6, endplate degenerative changes.        ASSESSMENT:    Diagnoses and all orders for this visit:    Cervical pain (neck)    Degenerative disc disease, cervical    Spondylosis of cervical region without myelopathy or radiculopathy    Chronic midline thoracic back pain          PLAN    Tramadol prescribed for pain.      Leodan Eason MD

## 2018-12-12 ENCOUNTER — OFFICE VISIT (OUTPATIENT)
Dept: FAMILY MEDICINE CLINIC | Facility: CLINIC | Age: 53
End: 2018-12-12

## 2018-12-12 VITALS
DIASTOLIC BLOOD PRESSURE: 86 MMHG | SYSTOLIC BLOOD PRESSURE: 130 MMHG | WEIGHT: 133 LBS | BODY MASS INDEX: 22.71 KG/M2 | HEIGHT: 64 IN

## 2018-12-12 DIAGNOSIS — E78.2 MIXED HYPERLIPIDEMIA: ICD-10-CM

## 2018-12-12 DIAGNOSIS — F33.1 MODERATE EPISODE OF RECURRENT MAJOR DEPRESSIVE DISORDER (HCC): ICD-10-CM

## 2018-12-12 DIAGNOSIS — I10 BENIGN HYPERTENSION: Primary | ICD-10-CM

## 2018-12-12 DIAGNOSIS — Z12.11 SCREENING FOR COLON CANCER: ICD-10-CM

## 2018-12-12 PROCEDURE — 99214 OFFICE O/P EST MOD 30 MIN: CPT | Performed by: NURSE PRACTITIONER

## 2018-12-12 NOTE — PROGRESS NOTES
"Subjective   Patricia Collins is a 53 y.o. female.  One month follow-up for HTN, hyperlipidemia and depression.   Was placed on Seroquel at her previous visit.  \"I am feeling much better.  That is a miracle drug\"  Her father passed away \"when I was [age] from colon cancer.\"   She reports he has never had a colonoscopy.      Hypertension   This is a chronic problem. The current episode started more than 1 year ago. The problem is unchanged. The problem is controlled. Associated symptoms include anxiety and neck pain. Pertinent negatives include no peripheral edema or sweats. There are no associated agents to hypertension. Risk factors for coronary artery disease include stress, post-menopausal state and smoking/tobacco exposure. Current antihypertension treatment includes diuretics. The current treatment provides moderate improvement. There are no compliance problems.  There is no history of kidney disease, CAD/MI, CVA, heart failure, left ventricular hypertrophy or retinopathy.   Depression   Visit Type: follow-up  Patient presents with the following symptoms: fatigue, muscle tension and restlessness.  Patient is not experiencing: anhedonia, chest pain, compulsions, confusion, depressed mood, dizziness, excessive worry, feelings of hopelessness, feelings of worthlessness, hypersomnia, hyperventilation, malaise, memory impairment, nausea, obsessions, panic, psychomotor agitation, psychomotor retardation, suicidal planning and thoughts of death.  Frequency of symptoms: occasionally   Severity: moderate   Sleep quality: non-restorative  Nighttime awakenings: several  Compliance with medications:  %        Hyperlipidemia   This is a chronic problem. The current episode started more than 1 year ago. The problem is controlled. Factors aggravating her hyperlipidemia include smoking and thiazides. Current antihyperlipidemic treatment includes statins. The current treatment provides significant improvement of " lipids.     The following portions of the patient's history were reviewed and updated as appropriate: allergies, current medications, past family history, past medical history, past social history, past surgical history and problem list.    Review of Systems   Constitutional: Negative.  Negative for chills and diaphoresis.   HENT: Negative.    Eyes: Negative.    Respiratory: Negative.    Cardiovascular: Negative.    Gastrointestinal: Negative.    Genitourinary: Negative.    Musculoskeletal: Positive for neck pain.   Skin: Negative.    Neurological: Negative.    Psychiatric/Behavioral: Negative.  Negative for confusion.       Objective   Physical Exam   Constitutional: She is oriented to person, place, and time. She appears well-developed and well-nourished.   Strong cigarette odor present   HENT:   Head: Normocephalic.   Right Ear: External ear normal.   Left Ear: External ear normal.   Eyes: EOM are normal. Pupils are equal, round, and reactive to light.   Neck: Normal range of motion. Neck supple.   Cardiovascular: Normal rate, regular rhythm and normal heart sounds.   Pulmonary/Chest: Effort normal and breath sounds normal.   Abdominal: Soft. Bowel sounds are normal.   Musculoskeletal: Normal range of motion.   Neurological: She is alert and oriented to person, place, and time.   Skin: Skin is warm. Capillary refill takes less than 2 seconds.   Psychiatric: She has a normal mood and affect. Her behavior is normal.   Nursing note and vitals reviewed.      Assessment/Plan   Problems Addressed this Visit        Cardiovascular and Mediastinum    Hyperlipidemia    Benign hypertension - Primary       Other    Moderate episode of recurrent major depressive disorder (CMS/HCC)      Other Visit Diagnoses     Screening for colon cancer        Relevant Orders    Ambulatory Referral For Screening Colonoscopy        1.  Benign hypertension:  Continue on HCTZ as previously prescribed   Reduce sodium intake to no more than 2000  mg per day  Encouraged increased physical activity regimen such as walking to help promote cardiovascular health    2.  Hyperlipidemia:  Continue on Lipitor as previously prescribed  Encouraged to adhere to low-fat diet    3.  Altered episode of recurrent major depressive disorder:  Continue on Seroquel as previously prescribed  Encouraged to seek emergency medical treatment for any new or worsening signs of depression such as suicidal/homicidal ideations    4.  Screening for colon cancer:  Referral placed for screening colonoscopy and will call to schedule appointment  Strongly encouraged to keep this appointment due to immediate family history of colon cancer including her father as well as her daughter having had colon polyps removed    Continue on current medications as previously prescribed   Schedule follow-up appointment with this office in 3 months for recheck or sooner as needed        This document has been electronically signed by JAI Mccurdy on December 12, 2018 3:43 PM

## 2018-12-18 ENCOUNTER — PREP FOR SURGERY (OUTPATIENT)
Dept: OTHER | Facility: HOSPITAL | Age: 53
End: 2018-12-18

## 2018-12-18 DIAGNOSIS — Z12.11 SCREEN FOR COLON CANCER: Primary | ICD-10-CM

## 2018-12-18 RX ORDER — DEXTROSE AND SODIUM CHLORIDE 5; .45 G/100ML; G/100ML
100 INJECTION, SOLUTION INTRAVENOUS CONTINUOUS
Status: CANCELLED | OUTPATIENT
Start: 2019-01-14

## 2018-12-27 RX ORDER — IBUPROFEN 800 MG/1
800 TABLET ORAL EVERY 6 HOURS PRN
Qty: 120 TABLET | Refills: 2 | Status: SHIPPED | OUTPATIENT
Start: 2018-12-27 | End: 2019-03-13

## 2019-01-11 RX ORDER — DEXTROSE AND SODIUM CHLORIDE 5; .45 G/100ML; G/100ML
20 INJECTION, SOLUTION INTRAVENOUS CONTINUOUS
Status: CANCELLED | OUTPATIENT
Start: 2019-01-14

## 2019-01-14 ENCOUNTER — ANESTHESIA EVENT (OUTPATIENT)
Dept: GASTROENTEROLOGY | Facility: HOSPITAL | Age: 54
End: 2019-01-14

## 2019-01-14 ENCOUNTER — HOSPITAL ENCOUNTER (OUTPATIENT)
Facility: HOSPITAL | Age: 54
Setting detail: HOSPITAL OUTPATIENT SURGERY
Discharge: HOME | End: 2019-01-14
Attending: SURGERY | Admitting: SURGERY

## 2019-01-14 ENCOUNTER — ANESTHESIA (OUTPATIENT)
Dept: GASTROENTEROLOGY | Facility: HOSPITAL | Age: 54
End: 2019-01-14

## 2019-01-14 VITALS
BODY MASS INDEX: 22.53 KG/M2 | HEIGHT: 64 IN | TEMPERATURE: 97.4 F | SYSTOLIC BLOOD PRESSURE: 104 MMHG | HEART RATE: 77 BPM | WEIGHT: 132 LBS | DIASTOLIC BLOOD PRESSURE: 66 MMHG | RESPIRATION RATE: 16 BRPM | OXYGEN SATURATION: 94 %

## 2019-01-14 DIAGNOSIS — Z12.11 SCREEN FOR COLON CANCER: ICD-10-CM

## 2019-01-14 PROCEDURE — 88305 TISSUE EXAM BY PATHOLOGIST: CPT | Performed by: PATHOLOGY

## 2019-01-14 PROCEDURE — 88305 TISSUE EXAM BY PATHOLOGIST: CPT | Performed by: SURGERY

## 2019-01-14 PROCEDURE — 45380 COLONOSCOPY AND BIOPSY: CPT | Performed by: SURGERY

## 2019-01-14 PROCEDURE — 25010000002 PROPOFOL 10 MG/ML EMULSION: Performed by: NURSE ANESTHETIST, CERTIFIED REGISTERED

## 2019-01-14 RX ORDER — PROMETHAZINE HYDROCHLORIDE 25 MG/ML
12.5 INJECTION, SOLUTION INTRAMUSCULAR; INTRAVENOUS ONCE AS NEEDED
Status: DISCONTINUED | OUTPATIENT
Start: 2019-01-14 | End: 2019-01-14 | Stop reason: HOSPADM

## 2019-01-14 RX ORDER — DEXAMETHASONE SODIUM PHOSPHATE 4 MG/ML
8 INJECTION, SOLUTION INTRA-ARTICULAR; INTRALESIONAL; INTRAMUSCULAR; INTRAVENOUS; SOFT TISSUE ONCE AS NEEDED
Status: DISCONTINUED | OUTPATIENT
Start: 2019-01-14 | End: 2019-01-14 | Stop reason: HOSPADM

## 2019-01-14 RX ORDER — PROPOFOL 10 MG/ML
VIAL (ML) INTRAVENOUS AS NEEDED
Status: DISCONTINUED | OUTPATIENT
Start: 2019-01-14 | End: 2019-01-14 | Stop reason: SURG

## 2019-01-14 RX ORDER — LIDOCAINE HYDROCHLORIDE 10 MG/ML
INJECTION, SOLUTION INFILTRATION; PERINEURAL AS NEEDED
Status: DISCONTINUED | OUTPATIENT
Start: 2019-01-14 | End: 2019-01-14 | Stop reason: SURG

## 2019-01-14 RX ORDER — DEXTROSE AND SODIUM CHLORIDE 5; .45 G/100ML; G/100ML
100 INJECTION, SOLUTION INTRAVENOUS CONTINUOUS
Status: DISCONTINUED | OUTPATIENT
Start: 2019-01-14 | End: 2019-01-14 | Stop reason: HOSPADM

## 2019-01-14 RX ORDER — PROMETHAZINE HYDROCHLORIDE 25 MG/1
25 TABLET ORAL ONCE AS NEEDED
Status: DISCONTINUED | OUTPATIENT
Start: 2019-01-14 | End: 2019-01-14 | Stop reason: HOSPADM

## 2019-01-14 RX ORDER — ONDANSETRON 2 MG/ML
4 INJECTION INTRAMUSCULAR; INTRAVENOUS ONCE AS NEEDED
Status: DISCONTINUED | OUTPATIENT
Start: 2019-01-14 | End: 2019-01-14 | Stop reason: HOSPADM

## 2019-01-14 RX ORDER — PROMETHAZINE HYDROCHLORIDE 25 MG/1
25 SUPPOSITORY RECTAL ONCE AS NEEDED
Status: DISCONTINUED | OUTPATIENT
Start: 2019-01-14 | End: 2019-01-14 | Stop reason: HOSPADM

## 2019-01-14 RX ADMIN — PROPOFOL 50 MG: 10 INJECTION, EMULSION INTRAVENOUS at 10:44

## 2019-01-14 RX ADMIN — PROPOFOL 20 MG: 10 INJECTION, EMULSION INTRAVENOUS at 10:38

## 2019-01-14 RX ADMIN — LIDOCAINE HYDROCHLORIDE 50 MG: 10 INJECTION, SOLUTION INFILTRATION; PERINEURAL at 10:37

## 2019-01-14 RX ADMIN — PROPOFOL 20 MG: 10 INJECTION, EMULSION INTRAVENOUS at 10:50

## 2019-01-14 RX ADMIN — PROPOFOL 20 MG: 10 INJECTION, EMULSION INTRAVENOUS at 10:40

## 2019-01-14 RX ADMIN — PROPOFOL 20 MG: 10 INJECTION, EMULSION INTRAVENOUS at 10:54

## 2019-01-14 RX ADMIN — DEXTROSE AND SODIUM CHLORIDE 100 ML/HR: 5; 450 INJECTION, SOLUTION INTRAVENOUS at 10:19

## 2019-01-14 RX ADMIN — PROPOFOL 20 MG: 10 INJECTION, EMULSION INTRAVENOUS at 10:52

## 2019-01-14 RX ADMIN — PROPOFOL 40 MG: 10 INJECTION, EMULSION INTRAVENOUS at 10:46

## 2019-01-14 RX ADMIN — PROPOFOL 20 MG: 10 INJECTION, EMULSION INTRAVENOUS at 10:42

## 2019-01-14 RX ADMIN — PROPOFOL 80 MG: 10 INJECTION, EMULSION INTRAVENOUS at 10:37

## 2019-01-14 NOTE — ANESTHESIA PREPROCEDURE EVALUATION
Anesthesia Evaluation     Patient summary reviewed and Nursing notes reviewed   NPO Solid Status: > 8 hours  NPO Liquid Status: > 8 hours           Airway   No difficulty expected  Dental      Pulmonary - normal exam   (+) COPD,   Cardiovascular - normal exam    (+) hypertension less than 2 medications, hyperlipidemia,       Neuro/Psych  (+) psychiatric history Depression,     GI/Hepatic/Renal/Endo    (+)   diabetes mellitus type 2,     Musculoskeletal     (+) neck pain,   Abdominal  - normal exam   Substance History      OB/GYN          Other   (+) arthritis                     Anesthesia Plan    ASA 3     MAC     intravenous induction   Anesthetic plan, all risks, benefits, and alternatives have been provided, discussed and informed consent has been obtained with: patient.    Plan discussed with CRNA.

## 2019-01-14 NOTE — H&P
No chief complaint on file.  Father  in his 40's from colon ca.    Patricia Collins is a 53 y.o. female referred today for evaluation for colonoscopy.  She notes no change in bowel habits, no blood in the stool.     Prior Colonoscopy:no  Prior Polyps:no  Family History of Colon Cancer:yes  On anticoagulation:no    Past Surgical History:   Procedure Laterality Date   • BREAST BIOPSY Bilateral    •  SECTION     • CYSTOSCOPY N/A 2018    Procedure: CYSTOSCOPY; URETHRAL DILATION;  Surgeon: Mychal Cabrera MD;  Location: [facility];  Service:      Past Medical History:   Diagnosis Date   • Acute frontal sinusitis    • Benign hypertension    • Breast mass     left breast, right breast with questionable node   • Chronic obstructive lung disease (CMS/HCC)    • Cough     likely secondary to chronic smoking   • Depression    • Diabetes mellitus (CMS/HCC)     diet controlled   • Hyperlipidemia    • Smokes      Social History     Socioeconomic History   • Marital status:      Spouse name: Not on file   • Number of children: Not on file   • Years of education: Not on file   • Highest education level: Not on file   Social Needs   • Financial resource strain: Not on file   • Food insecurity - worry: Not on file   • Food insecurity - inability: Not on file   • Transportation needs - medical: Not on file   • Transportation needs - non-medical: Not on file   Occupational History   • Not on file   Tobacco Use   • Smoking status: Current Every Day Smoker     Packs/day: 1.00     Types: Cigarettes   • Smokeless tobacco: Never Used   Substance and Sexual Activity   • Alcohol use: Yes     Comment: socially   • Drug use: No   • Sexual activity: Defer   Other Topics Concern   • Not on file   Social History Narrative   • Not on file     Allergies   Allergen Reactions   • Codeine Nausea And Vomiting       Home Medications:  Prior to Admission medications    Medication Sig Start Date End Date Taking? Authorizing  Provider   atorvastatin (LIPITOR) 10 MG tablet Take 1 tablet by mouth Daily. 11/13/18  Yes Georgina Mcrae APRN   betamethasone, augmented, (DIPROLENE) 0.05 % ointment Apply  topically to the appropriate area as directed Daily. 11/12/18  Yes Georgina Mcrae APRN   fluticasone (FLONASE) 50 MCG/ACT nasal spray 2 sprays into the nostril(s) as directed by provider Daily. 11/12/18  Yes Georgina Mcrae APRN   hydrochlorothiazide (HYDRODIURIL) 25 MG tablet Take 1 tablet by mouth Daily. 11/12/18  Yes Georgina Mcrae APRN   ibuprofen (ADVIL,MOTRIN) 800 MG tablet Take 1 tablet by mouth Every 6 (Six) Hours As Needed for Mild Pain . 12/27/18  Yes Georgina Mcrae APRN   QUEtiapine (SEROquel) 50 MG tablet Take 1 tablet by mouth Every Night. 11/12/18  Yes Georgina Mcrae APRN   rOPINIRole (REQUIP) 0.25 MG tablet Take 1 tablet by mouth Every Night. Take 1 hour before bedtime. 11/12/18  Yes Georgina Mcrae APRN   albuterol (PROVENTIL HFA;VENTOLIN HFA) 108 (90 Base) MCG/ACT inhaler Inhale 2 puffs Every 6 (Six) Hours As Needed for Wheezing. 11/12/18   Georgina Mcrae APRN   Fluticasone Furoate-Vilanterol (BREO ELLIPTA) 200-25 MCG/INH aerosol powder  Inhale 1 inhaler Daily. 11/12/18   Georgina Mcrae APRN       Review of Systems   Constitutional: Negative.    HENT: Negative for hearing loss, nosebleeds and trouble swallowing.    Respiratory: Negative for apnea, chest tightness and shortness of breath.    Cardiovascular: Negative for chest pain and palpitations.   Gastrointestinal: Negative for abdominal distention, abdominal pain, blood in stool, constipation, diarrhea, nausea and vomiting.   Genitourinary: Negative for difficulty urinating, dysuria, frequency and urgency.   Musculoskeletal: Negative for back pain, joint swelling and neck pain.   Skin: Negative for rash.   Neurological: Negative for dizziness, seizures, weakness, light-headedness, numbness and headaches.   Hematological: Negative for adenopathy.   Psychiatric/Behavioral:  Negative for agitation. The patient is not nervous/anxious.        Vitals:    01/14/19 1018   BP: 143/74   Pulse: 88   Resp: 20   Temp: 96.8 °F (36 °C)   SpO2: 97%       Physical Exam   Constitutional: She is oriented to person, place, and time. She appears well-developed and well-nourished. No distress.   HENT:   Head: Normocephalic and atraumatic.   Nose: Nose normal.   Eyes: Conjunctivae are normal.   Neck: Normal range of motion. No tracheal deviation present. No thyromegaly present.   Cardiovascular: Normal rate, regular rhythm and normal heart sounds.   No murmur heard.  Pulmonary/Chest: Effort normal and breath sounds normal. No respiratory distress. She has no wheezes. She has no rales. She exhibits no tenderness.   Abdominal: Soft. She exhibits no distension. There is no tenderness. There is no rebound and no guarding. No hernia.   Musculoskeletal: She exhibits no tenderness or deformity.   Neurological: She is alert and oriented to person, place, and time.   Skin: Skin is warm and dry. No rash noted.   Psychiatric: She has a normal mood and affect. Her behavior is normal. Judgment and thought content normal.   Vitals reviewed.      Assessment     In need of screening colonoscopy.    Plan     Risks, benefits, rationale and prep for colonoscopy have been discussed with the patient.  The patient indicates understanding of these issues and agrees with the plan.

## 2019-01-14 NOTE — ANESTHESIA POSTPROCEDURE EVALUATION
Patient: Patricia Collins    Procedure Summary     Date:  01/14/19 Room / Location:  [facility] ENDOSCOPY 2 / [facility] ENDOSCOPY    Anesthesia Start:  1035 Anesthesia Stop:  1102    Procedure:  COLONOSCOPY (N/A ) Diagnosis:       Screen for colon cancer      (Screen for colon cancer [Z12.11])    Surgeon:  Arun Bagley MD Provider:  Mychal Schilling CRNA    Anesthesia Type:  MAC ASA Status:  3          Anesthesia Type: MAC  Last vitals  BP   143/74 (01/14/19 1018)   Temp   96.8 °F (36 °C) (01/14/19 1018)   Pulse   88 (01/14/19 1018)   Resp   20 (01/14/19 1018)     SpO2   97 % (01/14/19 1018)     Post Anesthesia Care and Evaluation    Patient location during evaluation: bedside  Patient participation: complete - patient participated  Level of consciousness: awake and alert  Pain score: 1  Pain management: adequate  Airway patency: patent  Anesthetic complications: No anesthetic complications  PONV Status: none  Cardiovascular status: acceptable  Respiratory status: acceptable  Hydration status: acceptable  Post Neuraxial Block status: Motor and sensory function returned to baseline

## 2019-01-15 LAB
LAB AP CASE REPORT: NORMAL
PATH REPORT.FINAL DX SPEC: NORMAL
PATH REPORT.GROSS SPEC: NORMAL

## 2019-01-17 DIAGNOSIS — F33.1 MODERATE EPISODE OF RECURRENT MAJOR DEPRESSIVE DISORDER (HCC): ICD-10-CM

## 2019-01-17 RX ORDER — QUETIAPINE FUMARATE 50 MG/1
50 TABLET, FILM COATED ORAL NIGHTLY
Qty: 30 TABLET | Refills: 1 | Status: SHIPPED | OUTPATIENT
Start: 2019-01-17 | End: 2019-03-13 | Stop reason: SDUPTHER

## 2019-03-13 ENCOUNTER — OFFICE VISIT (OUTPATIENT)
Dept: FAMILY MEDICINE CLINIC | Facility: CLINIC | Age: 54
End: 2019-03-13

## 2019-03-13 VITALS
BODY MASS INDEX: 22.62 KG/M2 | DIASTOLIC BLOOD PRESSURE: 86 MMHG | HEIGHT: 64 IN | WEIGHT: 132.5 LBS | SYSTOLIC BLOOD PRESSURE: 140 MMHG

## 2019-03-13 DIAGNOSIS — Z13.29 SCREENING FOR HYPOTHYROIDISM: ICD-10-CM

## 2019-03-13 DIAGNOSIS — R73.03 PREDIABETES: ICD-10-CM

## 2019-03-13 DIAGNOSIS — F33.1 MODERATE EPISODE OF RECURRENT MAJOR DEPRESSIVE DISORDER (HCC): ICD-10-CM

## 2019-03-13 DIAGNOSIS — I10 BENIGN HYPERTENSION: Primary | ICD-10-CM

## 2019-03-13 DIAGNOSIS — E78.2 MIXED HYPERLIPIDEMIA: ICD-10-CM

## 2019-03-13 DIAGNOSIS — G25.81 RESTLESS LEG SYNDROME: ICD-10-CM

## 2019-03-13 DIAGNOSIS — M50.30 DEGENERATIVE DISC DISEASE, CERVICAL: ICD-10-CM

## 2019-03-13 PROCEDURE — 99214 OFFICE O/P EST MOD 30 MIN: CPT | Performed by: NURSE PRACTITIONER

## 2019-03-13 RX ORDER — QUETIAPINE FUMARATE 50 MG/1
50 TABLET, FILM COATED ORAL NIGHTLY
Qty: 90 TABLET | Refills: 1 | Status: SHIPPED | OUTPATIENT
Start: 2019-03-13 | End: 2019-10-28 | Stop reason: SDUPTHER

## 2019-03-13 RX ORDER — HYDROCHLOROTHIAZIDE 25 MG/1
25 TABLET ORAL DAILY
Qty: 90 TABLET | Refills: 1 | Status: SHIPPED | OUTPATIENT
Start: 2019-03-13 | End: 2019-10-28 | Stop reason: SDUPTHER

## 2019-03-13 RX ORDER — ROPINIROLE 0.25 MG/1
0.25 TABLET, FILM COATED ORAL NIGHTLY
Qty: 90 TABLET | Refills: 1 | Status: SHIPPED | OUTPATIENT
Start: 2019-03-13 | End: 2019-10-03 | Stop reason: SDUPTHER

## 2019-03-13 RX ORDER — ATORVASTATIN CALCIUM 10 MG/1
10 TABLET, FILM COATED ORAL DAILY
Qty: 90 TABLET | Refills: 1 | Status: SHIPPED | OUTPATIENT
Start: 2019-03-13 | End: 2019-10-28 | Stop reason: SDUPTHER

## 2019-03-13 RX ORDER — NABUMETONE 750 MG/1
750 TABLET, FILM COATED ORAL 2 TIMES DAILY PRN
Qty: 60 TABLET | Refills: 2 | Status: SHIPPED | OUTPATIENT
Start: 2019-03-13 | End: 2019-05-21

## 2019-03-13 NOTE — PROGRESS NOTES
Subjective   Patricia Collins is a 53 y.o. female.  Three month follow-up.  For the past week has been complaining of bilateral lower back pain.      Hypertension   This is a chronic problem. The current episode started more than 1 year ago. The problem is unchanged. The problem is controlled. Associated symptoms include anxiety and neck pain. Pertinent negatives include no peripheral edema or sweats. There are no associated agents to hypertension. Risk factors for coronary artery disease include stress, post-menopausal state and smoking/tobacco exposure. Current antihypertension treatment includes diuretics. The current treatment provides moderate improvement. There are no compliance problems.  There is no history of kidney disease, CAD/MI, CVA, heart failure, left ventricular hypertrophy or retinopathy.   Depression   Visit Type: follow-up  Patient presents with the following symptoms: fatigue, muscle tension and restlessness.  Patient is not experiencing: anhedonia, chest pain, compulsions, confusion, depressed mood, dizziness, excessive worry, feelings of hopelessness, feelings of worthlessness, hypersomnia, hyperventilation, malaise, memory impairment, nausea, obsessions, panic, psychomotor agitation, psychomotor retardation, suicidal planning and thoughts of death.  Frequency of symptoms: occasionally   Severity: moderate   Sleep quality: non-restorative  Nighttime awakenings: several  Compliance with medications:  %        Hyperlipidemia   This is a chronic problem. The current episode started more than 1 year ago. The problem is controlled. Factors aggravating her hyperlipidemia include smoking and thiazides. Current antihyperlipidemic treatment includes statins. The current treatment provides significant improvement of lipids.   Neck Pain    This is a chronic problem. The current episode started more than 1 year ago. The problem occurs constantly. The problem has been unchanged. The pain is  present in the anterior neck. Pertinent negatives include no fever, pain with swallowing or trouble swallowing. She has tried NSAIDs for the symptoms. The treatment provided moderate relief.   Back Pain   This is a new problem. The current episode started 1 to 4 weeks ago. The problem has been waxing and waning since onset. The pain is present in the lumbar spine. The pain does not radiate. The pain is at a severity of 7/10. The pain is moderate. Pertinent negatives include no fever. Risk factors include menopause.        The following portions of the patient's history were reviewed and updated as appropriate:     Current Outpatient Medications   Medication Sig Dispense Refill   • albuterol (PROVENTIL HFA;VENTOLIN HFA) 108 (90 Base) MCG/ACT inhaler Inhale 2 puffs Every 6 (Six) Hours As Needed for Wheezing. 1 inhaler 11   • atorvastatin (LIPITOR) 10 MG tablet Take 1 tablet by mouth Daily. 90 tablet 1   • betamethasone, augmented, (DIPROLENE) 0.05 % ointment Apply  topically to the appropriate area as directed Daily. 50 g 2   • fluticasone (FLONASE) 50 MCG/ACT nasal spray 2 sprays into the nostril(s) as directed by provider Daily. 1 bottle 0   • Fluticasone Furoate-Vilanterol (BREO ELLIPTA) 200-25 MCG/INH aerosol powder  Inhale 1 inhaler Daily. 1 each 11   • hydrochlorothiazide (HYDRODIURIL) 25 MG tablet Take 1 tablet by mouth Daily. 90 tablet 1   • QUEtiapine (SEROquel) 50 MG tablet Take 1 tablet by mouth Every Night. 90 tablet 1   • rOPINIRole (REQUIP) 0.25 MG tablet Take 1 tablet by mouth Every Night. Take 1 hour before bedtime. 90 tablet 1   • nabumetone (RELAFEN) 750 MG tablet Take 1 tablet by mouth 2 (Two) Times a Day As Needed for Mild Pain . 60 tablet 2     No current facility-administered medications for this visit.      Current Outpatient Medications on File Prior to Visit   Medication Sig   • albuterol (PROVENTIL HFA;VENTOLIN HFA) 108 (90 Base) MCG/ACT inhaler Inhale 2 puffs Every 6 (Six) Hours As Needed  "for Wheezing.   • betamethasone, augmented, (DIPROLENE) 0.05 % ointment Apply  topically to the appropriate area as directed Daily.   • fluticasone (FLONASE) 50 MCG/ACT nasal spray 2 sprays into the nostril(s) as directed by provider Daily.   • Fluticasone Furoate-Vilanterol (BREO ELLIPTA) 200-25 MCG/INH aerosol powder  Inhale 1 inhaler Daily.   • [DISCONTINUED] atorvastatin (LIPITOR) 10 MG tablet Take 1 tablet by mouth Daily.   • [DISCONTINUED] hydrochlorothiazide (HYDRODIURIL) 25 MG tablet Take 1 tablet by mouth Daily.   • [DISCONTINUED] ibuprofen (ADVIL,MOTRIN) 800 MG tablet Take 1 tablet by mouth Every 6 (Six) Hours As Needed for Mild Pain .   • [DISCONTINUED] QUEtiapine (SEROquel) 50 MG tablet Take 1 tablet by mouth Every Night.   • [DISCONTINUED] rOPINIRole (REQUIP) 0.25 MG tablet Take 1 tablet by mouth Every Night. Take 1 hour before bedtime.     No current facility-administered medications on file prior to visit.      She is allergic to codeine..    Review of Systems   Constitutional: Negative.  Negative for chills, diaphoresis, fatigue and fever.   HENT: Negative.  Negative for trouble swallowing.    Eyes: Negative.    Respiratory: Negative.    Cardiovascular: Negative.    Gastrointestinal: Negative.    Genitourinary: Negative.    Musculoskeletal: Positive for back pain and neck pain.   Skin: Negative.    Neurological: Negative.    Psychiatric/Behavioral: Negative.  Negative for confusion.       Objective    Visit Vitals  /86   Ht 162.6 cm (64\")   Wt 60.1 kg (132 lb 8 oz)   BMI 22.74 kg/m²       Physical Exam   Constitutional: She is oriented to person, place, and time. She appears well-developed and well-nourished.   HENT:   Head: Normocephalic.   Right Ear: External ear normal.   Left Ear: External ear normal.   Eyes: EOM are normal. Pupils are equal, round, and reactive to light.   Neck: Normal range of motion. Neck supple.   Cardiovascular: Normal rate, regular rhythm and normal heart sounds. "   Pulmonary/Chest: Effort normal and breath sounds normal.   Abdominal: Soft. Bowel sounds are normal.   Musculoskeletal: Normal range of motion.        Lumbar back: She exhibits tenderness and pain. She exhibits normal range of motion and no bony tenderness.   Neurological: She is alert and oriented to person, place, and time.   Skin: Skin is warm. Capillary refill takes less than 2 seconds.   Psychiatric: She has a normal mood and affect. Her behavior is normal.   Nursing note and vitals reviewed.      Assessment/Plan   Problems Addressed this Visit        Cardiovascular and Mediastinum    Hyperlipidemia    Relevant Medications    atorvastatin (LIPITOR) 10 MG tablet    Other Relevant Orders    Lipid panel    Benign hypertension - Primary    Relevant Medications    hydrochlorothiazide (HYDRODIURIL) 25 MG tablet    Other Relevant Orders    CBC & Differential    Comprehensive Metabolic Panel       Musculoskeletal and Integument    Degenerative disc disease, cervical    Relevant Medications    nabumetone (RELAFEN) 750 MG tablet       Other    Moderate episode of recurrent major depressive disorder (CMS/HCC)    Relevant Medications    QUEtiapine (SEROquel) 50 MG tablet    Prediabetes    Relevant Orders    Hemoglobin A1c      Other Visit Diagnoses     Restless leg syndrome        Relevant Medications    rOPINIRole (REQUIP) 0.25 MG tablet    Screening for hypothyroidism        Relevant Orders    TSH        1.  Benign hypertension:  Continue on HCTZ as previously prescribed and refill prescription sent to pharmacy  Complete CBC and chemistry panel is ordered and will notify of results when available  Reduce sodium intake to no more than 2000 mg per day  Encouraged increased physical activity regimen such as walking to help promote cardiovascular health     2.  Hyperlipidemia:  Continue on Lipitor as previously prescribed and refill prescription sent to pharmacy  Complete fasting lipid panel is ordered and will notify of  results when available  Encouraged to adhere to low-fat diet     3.  Altered episode of recurrent major depressive disorder:  Continue on Seroquel as previously prescribed and refill prescription sent to pharmacy  Encouraged to seek emergency medical treatment for any new or worsening signs of depression such as suicidal/homicidal ideations     4.  Degenerative disc disease, cervical:  Discontinue Motrin as previously prescribed   Begin Relafen as prescribed to be taken one by mouth twice daily as needed   Educated on possible side effects of this medication including but not limited to increased risk for gastrointestinal bleeding  Encouraged not to take any other NSAIDs including but not limited to Advil, ibuprofen, Motrin, Naprosyn or Aleve while on this medication if may increased risk for gastrointestinal bleeding  Encouraged to take this medication with food in order to reduce GI discomfort  Educated to watch for signs of possible GI bleeding such as dark, black or tarry looking stools    5.  Restless leg syndrome:  Continue on Requip as previously prescribed and refill prescription sent to pharmacy  Educated on possible side effects of this medication including but not limited to increased risk for drowsiness and sedation  Encouraged not to take this medication prior to working or driving as she may be impaired due to drowsiness    6.  Prediabetes:  Complete hemoglobin A1c is ordered and will notify results when available  Encouraged to adhere to a diet low in concentrated sweets  Encouraged to increase physical activity regimen such as walking which will help to lower blood sugars    7.  Screening for hypothyroidism:  Complete TSH is ordered and will notify of results when available    Continue on current medications as previously prescribed   Return in about 3 months (around 6/13/2019) for Recheck.          This document has been electronically signed by JAI Mccurdy on March 13, 2019 8:19 AM

## 2019-04-22 ENCOUNTER — TELEPHONE (OUTPATIENT)
Dept: SURGERY | Facility: CLINIC | Age: 54
End: 2019-04-22

## 2019-05-21 ENCOUNTER — HOSPITAL ENCOUNTER (OUTPATIENT)
Dept: CT IMAGING | Facility: HOSPITAL | Age: 54
Discharge: HOME | End: 2019-05-21
Admitting: NURSE PRACTITIONER

## 2019-05-21 PROCEDURE — 74176 CT ABD & PELVIS W/O CONTRAST: CPT

## 2019-06-13 ENCOUNTER — LAB (OUTPATIENT)
Dept: LAB | Facility: HOSPITAL | Age: 54
End: 2019-06-13

## 2019-06-13 ENCOUNTER — OFFICE VISIT (OUTPATIENT)
Dept: FAMILY MEDICINE CLINIC | Facility: CLINIC | Age: 54
End: 2019-06-13

## 2019-06-13 VITALS
DIASTOLIC BLOOD PRESSURE: 78 MMHG | SYSTOLIC BLOOD PRESSURE: 114 MMHG | WEIGHT: 130.7 LBS | HEIGHT: 64 IN | BODY MASS INDEX: 22.31 KG/M2

## 2019-06-13 DIAGNOSIS — E78.2 MIXED HYPERLIPIDEMIA: Primary | ICD-10-CM

## 2019-06-13 DIAGNOSIS — E78.2 MIXED HYPERLIPIDEMIA: ICD-10-CM

## 2019-06-13 DIAGNOSIS — J44.1 CHRONIC OBSTRUCTIVE PULMONARY DISEASE WITH ACUTE EXACERBATION (HCC): ICD-10-CM

## 2019-06-13 DIAGNOSIS — I10 BENIGN HYPERTENSION: ICD-10-CM

## 2019-06-13 DIAGNOSIS — Z13.29 SCREENING FOR HYPOTHYROIDISM: ICD-10-CM

## 2019-06-13 DIAGNOSIS — Z12.39 SCREENING FOR BREAST CANCER: ICD-10-CM

## 2019-06-13 DIAGNOSIS — R73.03 PREDIABETES: ICD-10-CM

## 2019-06-13 DIAGNOSIS — F33.1 MODERATE EPISODE OF RECURRENT MAJOR DEPRESSIVE DISORDER (HCC): ICD-10-CM

## 2019-06-13 LAB
ALBUMIN SERPL-MCNC: 4.1 G/DL (ref 3.5–5.2)
ALBUMIN/GLOB SERPL: 1.4 G/DL
ALP SERPL-CCNC: 95 U/L (ref 39–117)
ALT SERPL W P-5'-P-CCNC: 16 U/L (ref 1–33)
ANION GAP SERPL CALCULATED.3IONS-SCNC: 11 MMOL/L
AST SERPL-CCNC: 19 U/L (ref 1–32)
BILIRUB SERPL-MCNC: 0.3 MG/DL (ref 0.2–1.2)
BUN BLD-MCNC: 14 MG/DL (ref 6–20)
BUN/CREAT SERPL: 15.1 (ref 7–25)
CALCIUM SPEC-SCNC: 9.9 MG/DL (ref 8.6–10.5)
CHLORIDE SERPL-SCNC: 103 MMOL/L (ref 98–107)
CHOLEST SERPL-MCNC: 143 MG/DL (ref 0–200)
CO2 SERPL-SCNC: 25 MMOL/L (ref 22–29)
CREAT BLD-MCNC: 0.93 MG/DL (ref 0.57–1)
GFR SERPL CREATININE-BSD FRML MDRD: 63 ML/MIN/1.73
GLOBULIN UR ELPH-MCNC: 3 GM/DL
GLUCOSE BLD-MCNC: 89 MG/DL (ref 65–99)
HBA1C MFR BLD: 5.54 % (ref 4.8–5.6)
HDLC SERPL-MCNC: 42 MG/DL (ref 40–60)
LDLC SERPL CALC-MCNC: 82 MG/DL (ref 0–100)
LDLC/HDLC SERPL: 1.94 {RATIO}
POTASSIUM BLD-SCNC: 4.5 MMOL/L (ref 3.5–5.2)
PROT SERPL-MCNC: 7.1 G/DL (ref 6–8.5)
SODIUM BLD-SCNC: 139 MMOL/L (ref 136–145)
TRIGL SERPL-MCNC: 97 MG/DL (ref 0–150)
TSH SERPL DL<=0.05 MIU/L-ACNC: 6.36 MIU/ML (ref 0.27–4.2)
VLDLC SERPL-MCNC: 19.4 MG/DL (ref 5–40)

## 2019-06-13 PROCEDURE — 84443 ASSAY THYROID STIM HORMONE: CPT

## 2019-06-13 PROCEDURE — 83036 HEMOGLOBIN GLYCOSYLATED A1C: CPT

## 2019-06-13 PROCEDURE — 80053 COMPREHEN METABOLIC PANEL: CPT

## 2019-06-13 PROCEDURE — 96372 THER/PROPH/DIAG INJ SC/IM: CPT | Performed by: NURSE PRACTITIONER

## 2019-06-13 PROCEDURE — 80061 LIPID PANEL: CPT

## 2019-06-13 PROCEDURE — 84480 ASSAY TRIIODOTHYRONINE (T3): CPT | Performed by: NURSE PRACTITIONER

## 2019-06-13 PROCEDURE — 99214 OFFICE O/P EST MOD 30 MIN: CPT | Performed by: NURSE PRACTITIONER

## 2019-06-13 PROCEDURE — 84439 ASSAY OF FREE THYROXINE: CPT | Performed by: NURSE PRACTITIONER

## 2019-06-13 RX ORDER — TRIAMCINOLONE ACETONIDE 40 MG/ML
80 INJECTION, SUSPENSION INTRA-ARTICULAR; INTRAMUSCULAR ONCE
Status: COMPLETED | OUTPATIENT
Start: 2019-06-13 | End: 2019-06-13

## 2019-06-13 RX ADMIN — TRIAMCINOLONE ACETONIDE 80 MG: 40 INJECTION, SUSPENSION INTRA-ARTICULAR; INTRAMUSCULAR at 08:46

## 2019-06-13 NOTE — PROGRESS NOTES
Subjective   Patricia Collins is a 53 y.o. female.  Three month follow-up. Reports dry cough, itchy throat, itchy watery eyes since Memorial day. Reports symptoms worse last night after being outside after grass was mowed.      Hypertension   This is a chronic problem. The current episode started more than 1 year ago. The problem is unchanged. The problem is controlled. Associated symptoms include anxiety. Pertinent negatives include no sweats. There are no associated agents to hypertension. Risk factors for coronary artery disease include stress, post-menopausal state and smoking/tobacco exposure. Current antihypertension treatment includes diuretics. The current treatment provides moderate improvement. There are no compliance problems.  There is no history of kidney disease, CAD/MI, CVA, heart failure, left ventricular hypertrophy or retinopathy.   Depression   Visit Type: follow-up  Patient presents with the following symptoms: fatigue, muscle tension and restlessness.  Patient is not experiencing: anhedonia, chest pain, compulsions, confusion, depressed mood, dizziness, excessive worry, feelings of hopelessness, feelings of worthlessness, hypersomnia, hyperventilation, malaise, memory impairment, nausea, obsessions, panic, psychomotor agitation, psychomotor retardation, suicidal planning and thoughts of death.  Frequency of symptoms: occasionally   Severity: moderate   Sleep quality: non-restorative  Nighttime awakenings: several  Compliance with medications:  %        Hyperlipidemia   This is a chronic problem. The current episode started more than 1 year ago. The problem is controlled. Factors aggravating her hyperlipidemia include smoking and thiazides. Current antihyperlipidemic treatment includes statins. The current treatment provides significant improvement of lipids.   COPD   This is a chronic problem. The current episode started more than 1 year ago. The problem occurs constantly. The problem  has been waxing and waning. Associated symptoms include coughing. Pertinent negatives include no chills, diaphoresis or fatigue. Exacerbated by: smokin. Treatments tried: Breo. The treatment provided moderate relief.   Nicotine Dependence   Presents for follow-up visit. Symptoms are negative for fatigue. Her urge triggers include company of smokers. The symptoms have been stable. Her first smoke is from 6 to 8 AM. She smokes 2 packs of cigarettes per day. Compliance with prior treatments has been poor.        The following portions of the patient's history were reviewed and updated as appropriate:     Current Outpatient Medications   Medication Sig Dispense Refill   • albuterol (PROVENTIL HFA;VENTOLIN HFA) 108 (90 Base) MCG/ACT inhaler Inhale 2 puffs Every 6 (Six) Hours As Needed for Wheezing. 1 inhaler 11   • atorvastatin (LIPITOR) 10 MG tablet Take 1 tablet by mouth Daily. 90 tablet 1   • betamethasone, augmented, (DIPROLENE) 0.05 % ointment Apply  topically to the appropriate area as directed Daily. 50 g 2   • Fluticasone Furoate-Vilanterol (BREO ELLIPTA) 200-25 MCG/INH aerosol powder  Inhale 1 inhaler Daily. 1 each 11   • hydrochlorothiazide (HYDRODIURIL) 25 MG tablet Take 1 tablet by mouth Daily. 90 tablet 1   • ibuprofen (ADVIL,MOTRIN) 800 MG tablet Take 800 mg by mouth Every 6 (Six) Hours As Needed for Mild Pain .     • QUEtiapine (SEROquel) 50 MG tablet Take 1 tablet by mouth Every Night. 90 tablet 1   • rOPINIRole (REQUIP) 0.25 MG tablet Take 1 tablet by mouth Every Night. Take 1 hour before bedtime. 90 tablet 1     Current Facility-Administered Medications   Medication Dose Route Frequency Provider Last Rate Last Dose   • triamcinolone acetonide (KENALOG-40) injection 80 mg  80 mg Intramuscular Once Georgina Mcrae APRN         Current Outpatient Medications on File Prior to Visit   Medication Sig   • albuterol (PROVENTIL HFA;VENTOLIN HFA) 108 (90 Base) MCG/ACT inhaler Inhale 2 puffs Every 6 (Six) Hours As  "Needed for Wheezing.   • atorvastatin (LIPITOR) 10 MG tablet Take 1 tablet by mouth Daily.   • betamethasone, augmented, (DIPROLENE) 0.05 % ointment Apply  topically to the appropriate area as directed Daily.   • Fluticasone Furoate-Vilanterol (BREO ELLIPTA) 200-25 MCG/INH aerosol powder  Inhale 1 inhaler Daily.   • hydrochlorothiazide (HYDRODIURIL) 25 MG tablet Take 1 tablet by mouth Daily.   • ibuprofen (ADVIL,MOTRIN) 800 MG tablet Take 800 mg by mouth Every 6 (Six) Hours As Needed for Mild Pain .   • QUEtiapine (SEROquel) 50 MG tablet Take 1 tablet by mouth Every Night.   • rOPINIRole (REQUIP) 0.25 MG tablet Take 1 tablet by mouth Every Night. Take 1 hour before bedtime.   • [DISCONTINUED] diclofenac (VOLTAREN) 50 MG EC tablet Take 1 tablet by mouth 2 (Two) Times a Day As Needed (back pain).     No current facility-administered medications on file prior to visit.      She is allergic to codeine..    Review of Systems   Constitutional: Negative.  Negative for chills, diaphoresis and fatigue.   HENT: Negative.    Eyes: Negative.    Respiratory: Positive for cough.    Cardiovascular: Negative.    Gastrointestinal: Negative.    Genitourinary: Negative.    Skin: Negative.    Neurological: Negative.    Psychiatric/Behavioral: Negative.  Negative for confusion.       Objective    Visit Vitals  /78   Ht 162.6 cm (64\")   Wt 59.3 kg (130 lb 11.2 oz)   BMI 22.43 kg/m²       Physical Exam   Constitutional: She is oriented to person, place, and time. She appears well-developed and well-nourished.   Strong smell of cigarette smoke noted.   HENT:   Head: Normocephalic.   Right Ear: External ear normal.   Left Ear: External ear normal.   Eyes: EOM are normal. Pupils are equal, round, and reactive to light.   Neck: Normal range of motion. Neck supple.   Cardiovascular: Normal rate, regular rhythm and normal heart sounds.   Pulmonary/Chest: Effort normal. She has decreased breath sounds in the right upper field, the right " lower field, the left upper field and the left lower field.   Intermittent dry cough noted throughout exam.   Abdominal: Soft. Bowel sounds are normal.   Musculoskeletal: Normal range of motion.   Neurological: She is alert and oriented to person, place, and time.   Skin: Skin is warm. Capillary refill takes less than 2 seconds.   Psychiatric: She has a normal mood and affect. Her behavior is normal.   Nursing note and vitals reviewed.      Assessment/Plan   Problems Addressed this Visit        Cardiovascular and Mediastinum    Hyperlipidemia - Primary    Benign hypertension       Respiratory    Chronic obstructive lung disease (CMS/HCC)    Relevant Medications    triamcinolone acetonide (KENALOG-40) injection 80 mg       Other    Moderate episode of recurrent major depressive disorder (CMS/HCC)      Other Visit Diagnoses     Screening for breast cancer        Relevant Orders    Mammo Screening Digital Tomosynthesis Bilateral With CAD        New Medications Ordered This Visit   Medications   • triamcinolone acetonide (KENALOG-40) injection 80 mg       1.  Benign hypertension:  Continue on HCTZ as previously prescribed and refill prescription sent to pharmacy  Complete CBC and chemistry panel is ordered and will notify of results when available  Reduce sodium intake to no more than 2000 mg per day  Encouraged increased physical activity regimen such as walking to help promote cardiovascular health     2.  Hyperlipidemia:  Continue on Lipitor as previously prescribed and refill prescription sent to pharmacy  Complete fasting lipid panel is ordered and will notify of results when available  Encouraged to adhere to low-fat diet     3.  Altered episode of recurrent major depressive disorder:  Continue on Seroquel as previously prescribed and refill prescription sent to pharmacy  Encouraged to seek emergency medical treatment for any new or worsening signs of depression such as suicidal/homicidal ideations    4.  COPD  Continue on Brio and albuterol inhalers as previously prescribed  Kenalog 80 mg given IM in office  Educated on possible side effects of steroidal medications  Continue on Bromfed-DM as previously prescribed  Strongly encouraged smoking cessation at this time but declines     5.  Screening for breast cancer:  Radiology will call to schedule bilateral mammogram  Encouraged monthly self breast exams at home    Continue on current medications as previously prescribed   Return in about 3 months (around 9/13/2019).          This document has been electronically signed by JAI Mccurdy on June 13, 2019 8:43 AM

## 2019-06-14 ENCOUNTER — TELEPHONE (OUTPATIENT)
Dept: FAMILY MEDICINE CLINIC | Facility: CLINIC | Age: 54
End: 2019-06-14

## 2019-06-14 DIAGNOSIS — R79.89 ELEVATED TSH: Primary | ICD-10-CM

## 2019-06-14 LAB
T3 SERPL-MCNC: 149 NG/DL (ref 80–200)
T4 FREE SERPL-MCNC: 1.46 NG/DL (ref 0.93–1.7)

## 2019-06-14 NOTE — TELEPHONE ENCOUNTER
Per JAI Gardiner, Ms Collins has been called with recent lab results & recommendations.  Continue current medications and follow-up as planned or sooner if any problems.      ----- Message from JAI Mccurdy sent at 6/14/2019  2:40 PM CDT -----  Further thyroid studies were within normal limits.  The slight elevation in her TSH just needs to be monitored at this time.  We will recheck her thyroid at her next appointment.  Thank you.

## 2019-06-14 NOTE — PROGRESS NOTES
Per JAI Gardiner, Ms Collins has been called with recent lab results & recommendations.  Continue current medications and follow-up as planned or sooner if any problems.

## 2019-07-12 DIAGNOSIS — R05.9 COUGH: ICD-10-CM

## 2019-07-12 RX ORDER — FLUTICASONE PROPIONATE 50 MCG
SPRAY, SUSPENSION (ML) NASAL
Qty: 1 BOTTLE | Refills: 5 | Status: SHIPPED | OUTPATIENT
Start: 2019-07-12 | End: 2021-01-21

## 2019-09-13 ENCOUNTER — OFFICE VISIT (OUTPATIENT)
Dept: FAMILY MEDICINE CLINIC | Facility: CLINIC | Age: 54
End: 2019-09-13

## 2019-09-13 VITALS
SYSTOLIC BLOOD PRESSURE: 140 MMHG | DIASTOLIC BLOOD PRESSURE: 84 MMHG | BODY MASS INDEX: 21.29 KG/M2 | HEIGHT: 64 IN | WEIGHT: 124.7 LBS

## 2019-09-13 DIAGNOSIS — Z71.6 TOBACCO ABUSE COUNSELING: ICD-10-CM

## 2019-09-13 DIAGNOSIS — I10 BENIGN HYPERTENSION: ICD-10-CM

## 2019-09-13 DIAGNOSIS — E78.2 MIXED HYPERLIPIDEMIA: ICD-10-CM

## 2019-09-13 DIAGNOSIS — F33.1 MODERATE EPISODE OF RECURRENT MAJOR DEPRESSIVE DISORDER (HCC): ICD-10-CM

## 2019-09-13 DIAGNOSIS — J44.1 CHRONIC OBSTRUCTIVE PULMONARY DISEASE WITH ACUTE EXACERBATION (HCC): Primary | ICD-10-CM

## 2019-09-13 DIAGNOSIS — F17.210 CIGARETTE NICOTINE DEPENDENCE WITHOUT COMPLICATION: ICD-10-CM

## 2019-09-13 PROCEDURE — 99406 BEHAV CHNG SMOKING 3-10 MIN: CPT | Performed by: NURSE PRACTITIONER

## 2019-09-13 PROCEDURE — 96372 THER/PROPH/DIAG INJ SC/IM: CPT | Performed by: NURSE PRACTITIONER

## 2019-09-13 PROCEDURE — 99214 OFFICE O/P EST MOD 30 MIN: CPT | Performed by: NURSE PRACTITIONER

## 2019-09-13 PROCEDURE — 94640 AIRWAY INHALATION TREATMENT: CPT | Performed by: NURSE PRACTITIONER

## 2019-09-13 RX ORDER — TRIAMCINOLONE ACETONIDE 40 MG/ML
80 INJECTION, SUSPENSION INTRA-ARTICULAR; INTRAMUSCULAR ONCE
Status: COMPLETED | OUTPATIENT
Start: 2019-09-13 | End: 2019-09-13

## 2019-09-13 RX ORDER — IPRATROPIUM BROMIDE AND ALBUTEROL SULFATE 2.5; .5 MG/3ML; MG/3ML
3 SOLUTION RESPIRATORY (INHALATION)
Status: DISCONTINUED | OUTPATIENT
Start: 2019-09-13 | End: 2019-09-13

## 2019-09-13 RX ORDER — IPRATROPIUM BROMIDE AND ALBUTEROL SULFATE 2.5; .5 MG/3ML; MG/3ML
3 SOLUTION RESPIRATORY (INHALATION) ONCE
Status: COMPLETED | OUTPATIENT
Start: 2019-09-13 | End: 2019-09-13

## 2019-09-13 RX ORDER — NICOTINE 21 MG/24HR
1 PATCH, TRANSDERMAL 24 HOURS TRANSDERMAL EVERY 24 HOURS
Qty: 30 PATCH | Refills: 2 | Status: SHIPPED | OUTPATIENT
Start: 2019-09-13 | End: 2020-07-28

## 2019-09-13 RX ADMIN — IPRATROPIUM BROMIDE AND ALBUTEROL SULFATE 3 ML: 2.5; .5 SOLUTION RESPIRATORY (INHALATION) at 08:20

## 2019-09-13 RX ADMIN — TRIAMCINOLONE ACETONIDE 80 MG: 40 INJECTION, SUSPENSION INTRA-ARTICULAR; INTRAMUSCULAR at 08:35

## 2019-09-13 NOTE — PATIENT INSTRUCTIONS
Coping with Quitting Smoking    Quitting smoking is a physical and mental challenge. You will face cravings, withdrawal symptoms, and temptation. Before quitting, work with your health care provider to make a plan that can help you cope. Preparation can help you quit and keep you from giving in.  How can I cope with cravings?  Cravings usually last for 5-10 minutes. If you get through it, the craving will pass. Consider taking the following actions to help you cope with cravings:  · Keep your mouth busy:  ? Chew sugar-free gum.  ? Suck on hard candies or a straw.  ? Brush your teeth.  · Keep your hands and body busy:  ? Immediately change to a different activity when you feel a craving.  ? Squeeze or play with a ball.  ? Do an activity or a hobby, like making bead jewelry, practicing needlepoint, or working with wood.  ? Mix up your normal routine.  ? Take a short exercise break. Go for a quick walk or run up and down stairs.  ? Spend time in public places where smoking is not allowed.  · Focus on doing something kind or helpful for someone else.  · Call a friend or family member to talk during a craving.  · Join a support group.  · Call a quit line, such as ShowNearby800Other MachineQUITOther MachineNOW.  · Talk with your health care provider about medicines that might help you cope with cravings and make quitting easier for you.  How can I deal with withdrawal symptoms?  Your body may experience negative effects as it tries to get used to not having nicotine in the system. These effects are called withdrawal symptoms. They may include:  · Feeling hungrier than normal.  · Trouble concentrating.  · Irritability.  · Trouble sleeping.  · Feeling depressed.  · Restlessness and agitation.  · Craving a cigarette.  To manage withdrawal symptoms:  · Avoid places, people, and activities that trigger your cravings.  · Remember why you want to quit.  · Get plenty of sleep.  · Avoid coffee and other caffeinated drinks. These may worsen some of your  symptoms.  How can I handle social situations?  Social situations can be difficult when you are quitting smoking, especially in the first few weeks. To manage this, you can:  · Avoid parties, bars, and other social situations where people might be smoking.  · Avoid alcohol.  · Leave right away if you have the urge to smoke.  · Explain to your family and friends that you are quitting smoking. Ask for understanding and support.  · Plan activities with friends or family where smoking is not an option.  What are some ways I can cope with stress?  Wanting to smoke may cause stress, and stress can make you want to smoke. Find ways to manage your stress. Relaxation techniques can help. For example:  · Breathe slowly and deeply, in through your nose and out through your mouth.  · Listen to soothing, relaxing music.  · Talk with a family member or friend about your stress.  · Light a candle.  · Soak in a bath or take a shower.  · Think about a peaceful place.  What are some ways I can prevent weight gain?  Be aware that many people gain weight after they quit smoking. However, not everyone does. To keep from gaining weight, have a plan in place before you quit and stick to the plan after you quit. Your plan should include:  · Having healthy snacks. When you have a craving, it may help to:  ? Eat plain popcorn, crunchy carrots, celery, or other cut vegetables.  ? Chew sugar-free gum.  · Changing how you eat:  ? Eat small portion sizes at meals.  ? Eat 4-6 small meals throughout the day instead of 1-2 large meals a day.  ? Be mindful when you eat. Do not watch television or do other things that might distract you as you eat.  · Exercising regularly:  ? Make time to exercise each day. If you do not have time for a long workout, do short bouts of exercise for 5-10 minutes several times a day.  ? Do some form of strengthening exercise, like weight lifting, and some form of aerobic exercise, like running or swimming.  · Drinking  plenty of water or other low-calorie or no-calorie drinks. Drink 6-8 glasses of water daily, or as much as instructed by your health care provider.  Summary  · Quitting smoking is a physical and mental challenge. You will face cravings, withdrawal symptoms, and temptation to smoke again. Preparation can help you as you go through these challenges.  · You can cope with cravings by keeping your mouth busy (such as by chewing gum), keeping your body and hands busy, and making calls to family, friends, or a helpline for people who want to quit smoking.  · You can cope with withdrawal symptoms by avoiding places where people smoke, avoiding drinks with caffeine, and getting plenty of rest.  · Ask your health care provider about the different ways to prevent weight gain, avoid stress, and handle social situations.  This information is not intended to replace advice given to you by your health care provider. Make sure you discuss any questions you have with your health care provider.  Document Released: 12/15/2017 Document Revised: 12/15/2017 Document Reviewed: 12/15/2017  [employer] Interactive Patient Education © 2019 [employer]  For more information:    Quit Now [place]  1-800-QUIT-NOW  https://[place].quitlogix.org/en-[place]/  Steps to Quit Smoking  Smoking tobacco can be harmful to your health and can affect almost every organ in your body. Smoking puts you, and those around you, at risk for developing many serious chronic diseases. Quitting smoking is difficult, but it is one of the best things that you can do for your health. It is never too late to quit.  What are the benefits of quitting smoking?  When you quit smoking, you lower your risk of developing serious diseases and conditions, such as:  · Lung cancer or lung disease, such as COPD.  · Heart disease.  · Stroke.  · Heart attack.  · Infertility.  · Osteoporosis and bone fractures.  Additionally, symptoms such as coughing, wheezing, and shortness of breath may  get better when you quit. You may also find that you get sick less often because your body is stronger at fighting off colds and infections. If you are pregnant, quitting smoking can help to reduce your chances of having a baby of low birth weight.  How do I get ready to quit?  When you decide to quit smoking, create a plan to make sure that you are successful. Before you quit:  · Pick a date to quit. Set a date within the next two weeks to give you time to prepare.  · Write down the reasons why you are quitting. Keep this list in places where you will see it often, such as on your bathroom mirror or in your car or wallet.  · Identify the people, places, things, and activities that make you want to smoke (triggers) and avoid them. Make sure to take these actions:  ¨ Throw away all cigarettes at home, at work, and in your car.  ¨ Throw away smoking accessories, such as ashtrays and lighters.  ¨ Clean your car and make sure to empty the ashtray.  ¨ Clean your home, including curtains and carpets.  · Tell your family, friends, and coworkers that you are quitting. Support from your loved ones can make quitting easier.  · Talk with your health care provider about your options for quitting smoking.  · Find out what treatment options are covered by your health insurance.  What strategies can I use to quit smoking?  Talk with your healthcare provider about different strategies to quit smoking. Some strategies include:  · Quitting smoking altogether instead of gradually lessening how much you smoke over a period of time. Research shows that quitting “cold turkey” is more successful than gradually quitting.  · Attending in-person counseling to help you build problem-solving skills. You are more likely to have success in quitting if you attend several counseling sessions. Even short sessions of 10 minutes can be effective.  · Finding resources and support systems that can help you to quit smoking and remain smoke-free after  you quit. These resources are most helpful when you use them often. They can include:  ¨ Online chats with a counselor.  ¨ Telephone quitlines.  ¨ Printed self-help materials.  ¨ Support groups or group counseling.  ¨ Text messaging programs.  ¨ Mobile phone applications.  · Taking medicines to help you quit smoking. (If you are pregnant or breastfeeding, talk with your health care provider first.) Some medicines contain nicotine and some do not. Both types of medicines help with cravings, but the medicines that include nicotine help to relieve withdrawal symptoms. Your health care provider may recommend:  ¨ Nicotine patches, gum, or lozenges.  ¨ Nicotine inhalers or sprays.  ¨ Non-nicotine medicine that is taken by mouth.  Talk with your health care provider about combining strategies, such as taking medicines while you are also receiving in-person counseling. Using these two strategies together makes you more likely to succeed in quitting than if you used either strategy on its own.  If you are pregnant or breastfeeding, talk with your health care provider about finding counseling or other support strategies to quit smoking. Do not take medicine to help you quit smoking unless told to do so by your health care provider.  What things can I do to make it easier to quit?  Quitting smoking might feel overwhelming at first, but there is a lot that you can do to make it easier. Take these important actions:  · Reach out to your family and friends and ask that they support and encourage you during this time. Call telephone quitlines, reach out to support groups, or work with a counselor for support.  · Ask people who smoke to avoid smoking around you.  · Avoid places that trigger you to smoke, such as bars, parties, or smoke-break areas at work.  · Spend time around people who do not smoke.  · Lessen stress in your life, because stress can be a smoking trigger for some people. To lessen stress, try:  ¨ Exercising  regularly.  ¨ Deep-breathing exercises.  ¨ Yoga.  ¨ Meditating.  ¨ Performing a body scan. This involves closing your eyes, scanning your body from head to toe, and noticing which parts of your body are particularly tense. Purposefully relax the muscles in those areas.  · Download or purchase mobile phone or tablet apps (applications) that can help you stick to your quit plan by providing reminders, tips, and encouragement. There are many free apps, such as QuitGuide from the CDC (Centers for Disease Control and Prevention). You can find other support for quitting smoking (smoking cessation) through smokefree[employer] and other websites.  How will I feel when I quit smoking?  Within the first 24 hours of quitting smoking, you may start to feel some withdrawal symptoms. These symptoms are usually most noticeable 2-3 days after quitting, but they usually do not last beyond 2-3 weeks. Changes or symptoms that you might experience include:  · Mood swings.  · Restlessness, anxiety, or irritation.  · Difficulty concentrating.  · Dizziness.  · Strong cravings for sugary foods in addition to nicotine.  · Mild weight gain.  · Constipation.  · Nausea.  · Coughing or a sore throat.  · Changes in how your medicines work in your body.  · A depressed mood.  · Difficulty sleeping (insomnia).  After the first 2-3 weeks of quitting, you may start to notice more positive results, such as:  · Improved sense of smell and taste.  · Decreased coughing and sore throat.  · Slower heart rate.  · Lower blood pressure.  · Clearer skin.  · The ability to breathe more easily.  · Fewer sick days.  Quitting smoking is very challenging for most people. Do not get discouraged if you are not successful the first time. Some people need to make many attempts to quit before they achieve long-term success. Do your best to stick to your quit plan, and talk with your health care provider if you have any questions or concerns.  This information is not intended  to replace advice given to you by your health care provider. Make sure you discuss any questions you have with your health care provider.  Document Released: 12/12/2002 Document Revised: 08/15/2017 Document Reviewed: 05/03/2016  [employer] Interactive Patient Education © 2017 [employer]

## 2019-09-13 NOTE — PROGRESS NOTES
Subjective   Patricia Collins is a 54 y.o. female.  3-month follow-up.  Has been complaining of a nonproductive cough with congestion for the past several weeks.    Hypertension   This is a chronic problem. The current episode started more than 1 year ago. The problem is unchanged. The problem is controlled. Associated symptoms include anxiety. Pertinent negatives include no sweats. There are no associated agents to hypertension. Risk factors for coronary artery disease include stress, post-menopausal state and smoking/tobacco exposure. Current antihypertension treatment includes diuretics. The current treatment provides moderate improvement. There are no compliance problems.  There is no history of kidney disease, CAD/MI, CVA, heart failure, left ventricular hypertrophy or retinopathy.   Depression   Visit Type: follow-up  Patient presents with the following symptoms: muscle tension, nervousness/anxiety and restlessness.  Patient is not experiencing: anhedonia, chest pain, compulsions, confusion, depressed mood, dizziness, excessive worry, feelings of hopelessness, feelings of worthlessness, hypersomnia, hyperventilation, malaise, memory impairment, nausea, obsessions, panic, psychomotor agitation, psychomotor retardation, suicidal planning and thoughts of death.  Frequency of symptoms: occasionally   Severity: moderate   Sleep quality: non-restorative  Nighttime awakenings: several  Compliance with medications:  %        Hyperlipidemia   This is a chronic problem. The current episode started more than 1 year ago. The problem is controlled. Factors aggravating her hyperlipidemia include smoking and thiazides. Current antihyperlipidemic treatment includes statins. The current treatment provides significant improvement of lipids.   COPD   There is no cough. This is a chronic problem. The current episode started more than 1 year ago. The problem occurs constantly. The problem has been waxing and waning.  Pertinent negatives include no fever or sweats. Exacerbated by: smokin. She reports moderate improvement on treatment.   Nicotine Dependence   Presents for follow-up visit. Symptoms are negative for fatigue. Her urge triggers include company of smokers. The symptoms have been stable. Her first smoke is from 6 to 8 AM. She smokes 2 packs of cigarettes per day. Compliance with prior treatments has been poor.        The following portions of the patient's history were reviewed and updated as appropriate:     Current Outpatient Medications   Medication Sig Dispense Refill   • albuterol (PROVENTIL HFA;VENTOLIN HFA) 108 (90 Base) MCG/ACT inhaler Inhale 2 puffs Every 6 (Six) Hours As Needed for Wheezing. 1 inhaler 11   • atorvastatin (LIPITOR) 10 MG tablet Take 1 tablet by mouth Daily. 90 tablet 1   • betamethasone, augmented, (DIPROLENE) 0.05 % ointment Apply  topically to the appropriate area as directed Daily. 50 g 2   • fluticasone (FLONASE) 50 MCG/ACT nasal spray SPRAY TWO SPRAYS IN EACH NOSTRIL AS DIRECTED BY THE PROVIDER ONCE DAILY 1 bottle 5   • Fluticasone Furoate-Vilanterol (BREO ELLIPTA) 200-25 MCG/INH aerosol powder  Inhale 1 inhaler Daily. 1 each 11   • hydrochlorothiazide (HYDRODIURIL) 25 MG tablet Take 1 tablet by mouth Daily. 90 tablet 1   • ibuprofen (ADVIL,MOTRIN) 800 MG tablet Take 800 mg by mouth Every 6 (Six) Hours As Needed for Mild Pain .     • QUEtiapine (SEROquel) 50 MG tablet Take 1 tablet by mouth Every Night. 90 tablet 1   • rOPINIRole (REQUIP) 0.25 MG tablet Take 1 tablet by mouth Every Night. Take 1 hour before bedtime. 90 tablet 1   • nicotine (NICODERM CQ) 21 MG/24HR patch Place 1 patch on the skin as directed by provider Daily. 30 patch 2     No current facility-administered medications for this visit.      Current Outpatient Medications on File Prior to Visit   Medication Sig   • albuterol (PROVENTIL HFA;VENTOLIN HFA) 108 (90 Base) MCG/ACT inhaler Inhale 2 puffs Every 6 (Six) Hours As  "Needed for Wheezing.   • atorvastatin (LIPITOR) 10 MG tablet Take 1 tablet by mouth Daily.   • betamethasone, augmented, (DIPROLENE) 0.05 % ointment Apply  topically to the appropriate area as directed Daily.   • fluticasone (FLONASE) 50 MCG/ACT nasal spray SPRAY TWO SPRAYS IN EACH NOSTRIL AS DIRECTED BY THE PROVIDER ONCE DAILY   • Fluticasone Furoate-Vilanterol (BREO ELLIPTA) 200-25 MCG/INH aerosol powder  Inhale 1 inhaler Daily.   • hydrochlorothiazide (HYDRODIURIL) 25 MG tablet Take 1 tablet by mouth Daily.   • ibuprofen (ADVIL,MOTRIN) 800 MG tablet Take 800 mg by mouth Every 6 (Six) Hours As Needed for Mild Pain .   • QUEtiapine (SEROquel) 50 MG tablet Take 1 tablet by mouth Every Night.   • rOPINIRole (REQUIP) 0.25 MG tablet Take 1 tablet by mouth Every Night. Take 1 hour before bedtime.     No current facility-administered medications on file prior to visit.      She is allergic to codeine..    Review of Systems   Constitutional: Negative.  Negative for chills, diaphoresis, fatigue and fever.   HENT: Negative.    Eyes: Negative.    Respiratory: Negative.  Negative for cough.    Cardiovascular: Negative.    Gastrointestinal: Negative.    Genitourinary: Negative.    Musculoskeletal: Negative.    Skin: Negative.    Neurological: Negative.    Psychiatric/Behavioral: Negative for confusion. The patient is nervous/anxious.        Objective    Visit Vitals  /84   Ht 162.6 cm (64\")   Wt 56.6 kg (124 lb 11.2 oz)   LMP  (LMP Unknown)   BMI 21.40 kg/m²       Physical Exam   Constitutional: She is oriented to person, place, and time. She appears well-developed and well-nourished.   Strong cigarette odor   HENT:   Head: Normocephalic.   Right Ear: External ear normal.   Left Ear: External ear normal.   Mouth/Throat:       Eyes: EOM are normal. Pupils are equal, round, and reactive to light.   Neck: Normal range of motion. Neck supple.   Cardiovascular: Normal rate, regular rhythm and normal heart sounds. "   Pulmonary/Chest: Effort normal and breath sounds normal.   Abdominal: Soft. Bowel sounds are normal.   Musculoskeletal: Normal range of motion.   Neurological: She is alert and oriented to person, place, and time.   Skin: Skin is warm. Capillary refill takes less than 2 seconds.   Psychiatric: Her speech is normal and behavior is normal. Her mood appears anxious.   Nursing note and vitals reviewed.      Assessment/Plan   Problems Addressed this Visit        Cardiovascular and Mediastinum    Hyperlipidemia    Benign hypertension       Respiratory    Chronic obstructive lung disease (CMS/HCC) - Primary    Relevant Medications    ipratropium-albuterol (DUO-NEB) nebulizer solution 3 mL (Completed)    triamcinolone acetonide (KENALOG-40) injection 80 mg (Completed)       Other    Moderate episode of recurrent major depressive disorder (CMS/HCC)    Relevant Medications    nicotine (NICODERM CQ) 21 MG/24HR patch      Other Visit Diagnoses     Cigarette nicotine dependence without complication        Relevant Medications    nicotine (NICODERM CQ) 21 MG/24HR patch    Tobacco abuse counseling            New Medications Ordered This Visit   Medications   • nicotine (NICODERM CQ) 21 MG/24HR patch     Sig: Place 1 patch on the skin as directed by provider Daily.     Dispense:  30 patch     Refill:  2   • ipratropium-albuterol (DUO-NEB) nebulizer solution 3 mL   • triamcinolone acetonide (KENALOG-40) injection 80 mg     1.  Benign hypertension:  Continue on HCTZ as previously prescribed and refill prescription sent to pharmacy  Complete CBC and chemistry panel is ordered and will notify of results when available  Reduce sodium intake to no more than 2000 mg per day  Encouraged increased physical activity regimen such as walking to help promote cardiovascular health     2.  Hyperlipidemia:  Continue on Lipitor as previously prescribed and refill prescription sent to pharmacy  Complete fasting lipid panel is ordered and will  notify of results when available  Encouraged to adhere to low-fat diet     3.  Moderate episode of recurrent major depressive disorder:  Continue on Seroquel as previously prescribed and refill prescription sent to pharmacy  Encouraged to seek emergency medical treatment for any new or worsening signs of depression such as suicidal/homicidal ideations     4. COPD with acute exacerbation:  Kenalog 80 mg given IM in office  Educated on possible side effects of steroidal medications  DuoNeb nebulizer given in office and lungs clear to auscultation post nebulization    5.  Cigarette nicotine dependence without complication:  Begin NicoDerm CQ as prescribed  Educated on importance of setting a quit date within 2 weeks of starting patches  Gated on possible side effects of this medication including but not limited to increased risk for rash, redness or itching to skin    6.  Tobacco abuse counseling:  Patricia Collins is a current cigarettes user.  She currently smokes 2 pack of cigarettes and packs of cigarettes per day for a duration of 35 years. I have educated her on the risk of diseases from using tobacco products such as cancer, COPD and heart diease.   I advised her to quit and she is willing to quit. We have discussed the following method/s for tobacco cessation:  Education Material Counseling OTC Cessation Products.  Together we have set a quit date for 2 weeks from today.  She will follow up with me in 6 weeks or sooner to check on her progress.  I spent 6.5 minutes counseling the patient.     Continue on current medications as previously prescribed   Return in about 6 weeks (around 10/25/2019) for Annual physical.        This document has been electronically signed by JAI Mccurdy on September 13, 2019 9:45 AM

## 2019-10-03 DIAGNOSIS — G25.81 RESTLESS LEG SYNDROME: ICD-10-CM

## 2019-10-04 RX ORDER — ROPINIROLE 0.25 MG/1
TABLET, FILM COATED ORAL
Qty: 30 TABLET | Refills: 0 | Status: SHIPPED | OUTPATIENT
Start: 2019-10-04 | End: 2019-10-28 | Stop reason: SDUPTHER

## 2019-10-28 ENCOUNTER — OFFICE VISIT (OUTPATIENT)
Dept: FAMILY MEDICINE CLINIC | Facility: CLINIC | Age: 54
End: 2019-10-28

## 2019-10-28 VITALS
HEIGHT: 64 IN | DIASTOLIC BLOOD PRESSURE: 76 MMHG | SYSTOLIC BLOOD PRESSURE: 116 MMHG | BODY MASS INDEX: 21.75 KG/M2 | WEIGHT: 127.4 LBS

## 2019-10-28 DIAGNOSIS — I10 BENIGN HYPERTENSION: ICD-10-CM

## 2019-10-28 DIAGNOSIS — E78.2 MIXED HYPERLIPIDEMIA: ICD-10-CM

## 2019-10-28 DIAGNOSIS — R25.2 MUSCLE CRAMPING: ICD-10-CM

## 2019-10-28 DIAGNOSIS — Z00.00 ANNUAL PHYSICAL EXAM: Primary | ICD-10-CM

## 2019-10-28 DIAGNOSIS — F41.9 ANXIETY: ICD-10-CM

## 2019-10-28 DIAGNOSIS — Z13.29 SCREENING FOR HYPOTHYROIDISM: ICD-10-CM

## 2019-10-28 DIAGNOSIS — Z13.220 SCREENING FOR HYPERCHOLESTEROLEMIA: ICD-10-CM

## 2019-10-28 DIAGNOSIS — J01.00 ACUTE NON-RECURRENT MAXILLARY SINUSITIS: ICD-10-CM

## 2019-10-28 DIAGNOSIS — F33.1 MODERATE EPISODE OF RECURRENT MAJOR DEPRESSIVE DISORDER (HCC): ICD-10-CM

## 2019-10-28 DIAGNOSIS — J44.1 CHRONIC OBSTRUCTIVE PULMONARY DISEASE WITH ACUTE EXACERBATION (HCC): ICD-10-CM

## 2019-10-28 DIAGNOSIS — G25.81 RESTLESS LEG SYNDROME: ICD-10-CM

## 2019-10-28 PROCEDURE — 99396 PREV VISIT EST AGE 40-64: CPT | Performed by: NURSE PRACTITIONER

## 2019-10-28 RX ORDER — ROPINIROLE 0.25 MG/1
0.25 TABLET, FILM COATED ORAL NIGHTLY
Qty: 90 TABLET | Refills: 1 | Status: SHIPPED | OUTPATIENT
Start: 2019-10-28 | End: 2019-11-07 | Stop reason: SDUPTHER

## 2019-10-28 RX ORDER — ALBUTEROL SULFATE 90 UG/1
2 AEROSOL, METERED RESPIRATORY (INHALATION) EVERY 6 HOURS PRN
Qty: 1 INHALER | Refills: 11 | Status: SHIPPED | OUTPATIENT
Start: 2019-10-28 | End: 2022-01-06 | Stop reason: SDUPTHER

## 2019-10-28 RX ORDER — AMOXICILLIN AND CLAVULANATE POTASSIUM 875; 125 MG/1; MG/1
1 TABLET, FILM COATED ORAL 2 TIMES DAILY
Qty: 20 TABLET | Refills: 0 | Status: SHIPPED | OUTPATIENT
Start: 2019-10-28 | End: 2020-04-28

## 2019-10-28 RX ORDER — HYDROXYZINE PAMOATE 25 MG/1
25 CAPSULE ORAL 3 TIMES DAILY PRN
Qty: 90 CAPSULE | Refills: 3 | Status: SHIPPED | OUTPATIENT
Start: 2019-10-28 | End: 2021-01-04

## 2019-10-28 RX ORDER — HYDROCHLOROTHIAZIDE 25 MG/1
25 TABLET ORAL DAILY
Qty: 90 TABLET | Refills: 1 | Status: SHIPPED | OUTPATIENT
Start: 2019-10-28 | End: 2020-12-29 | Stop reason: SDUPTHER

## 2019-10-28 RX ORDER — ATORVASTATIN CALCIUM 10 MG/1
10 TABLET, FILM COATED ORAL DAILY
Qty: 90 TABLET | Refills: 1 | Status: SHIPPED | OUTPATIENT
Start: 2019-10-28 | End: 2019-11-04 | Stop reason: DRUGHIGH

## 2019-10-28 RX ORDER — QUETIAPINE FUMARATE 50 MG/1
50 TABLET, FILM COATED ORAL NIGHTLY
Qty: 90 TABLET | Refills: 1 | Status: SHIPPED | OUTPATIENT
Start: 2019-10-28 | End: 2021-01-04

## 2019-10-28 NOTE — PROGRESS NOTES
"Subjective   Patricia Collins is a 54 y.o. female.  Annual physical exam.  For the past few days has been complaining of sore throat with runny nose, nasal congestion.  Anxiety has been wrosening over the last several months.  Has taken Zoloft and Paxil in the past for her anxiety \"but they made me sick to my stomach.\"  Needs refills on all of her medications.    HPI:  Annual physical exam     Sinus Problem   This is a new problem. The current episode started 1 to 4 weeks ago. The problem is unchanged. There has been no fever. Her pain is at a severity of 8/10. The pain is moderate. Associated symptoms include congestion, ear pain, headaches, sinus pressure and a sore throat. Pertinent negatives include no chills or diaphoresis. The treatment provided no relief.   Anxiety   Presents for follow-up visit. Symptoms include decreased concentration, depressed mood, excessive worry, irritability, nervous/anxious behavior, panic and restlessness. Symptoms occur constantly. The severity of symptoms is moderate. The quality of sleep is good. Nighttime awakenings: one to two.     Compliance with medications is %.        The following portions of the patient's history were reviewed and updated as appropriate:   She  has a past medical history of Acute frontal sinusitis, Benign hypertension, Breast mass, Chronic obstructive lung disease (CMS/HCC), Cough, Depression, Diabetes mellitus (CMS/HCC), Hyperlipidemia, and Smokes.  She does not have any pertinent problems on file.  She  has a past surgical history that includes  section; Breast biopsy (Bilateral); Cystoscopy (N/A, 2018); and Colonoscopy (N/A, 2019).  Her family history includes Hypertension in her mother; Rectal cancer in her father.  She  reports that she has been smoking cigarettes.  She has been smoking about 1.00 pack per day. She has never used smokeless tobacco. She reports that she drinks alcohol. She reports that she does not use " drugs.  Current Outpatient Medications   Medication Sig Dispense Refill   • albuterol sulfate  (90 Base) MCG/ACT inhaler Inhale 2 puffs Every 6 (Six) Hours As Needed for Wheezing. 1 inhaler 11   • atorvastatin (LIPITOR) 10 MG tablet Take 1 tablet by mouth Daily. 90 tablet 1   • betamethasone, augmented, (DIPROLENE) 0.05 % ointment Apply  topically to the appropriate area as directed Daily. 50 g 2   • fluticasone (FLONASE) 50 MCG/ACT nasal spray SPRAY TWO SPRAYS IN EACH NOSTRIL AS DIRECTED BY THE PROVIDER ONCE DAILY 1 bottle 5   • Fluticasone Furoate-Vilanterol (BREO ELLIPTA) 200-25 MCG/INH inhaler Inhale 1 puff Daily. 1 each 11   • hydroCHLOROthiazide (HYDRODIURIL) 25 MG tablet Take 1 tablet by mouth Daily. 90 tablet 1   • ibuprofen (ADVIL,MOTRIN) 800 MG tablet Take 800 mg by mouth Every 6 (Six) Hours As Needed for Mild Pain .     • nicotine (NICODERM CQ) 21 MG/24HR patch Place 1 patch on the skin as directed by provider Daily. 30 patch 2   • QUEtiapine (SEROquel) 50 MG tablet Take 1 tablet by mouth Every Night. 90 tablet 1   • rOPINIRole (REQUIP) 0.25 MG tablet Take 1 tablet by mouth Every Night. Take 1 hour before bedtime. 90 tablet 1   • amoxicillin-clavulanate (AUGMENTIN) 875-125 MG per tablet Take 1 tablet by mouth 2 (Two) Times a Day. 20 tablet 0   • hydrOXYzine pamoate (VISTARIL) 25 MG capsule Take 1 capsule by mouth 3 (Three) Times a Day As Needed for Itching. 90 capsule 3     No current facility-administered medications for this visit.      Current Outpatient Medications on File Prior to Visit   Medication Sig   • betamethasone, augmented, (DIPROLENE) 0.05 % ointment Apply  topically to the appropriate area as directed Daily.   • fluticasone (FLONASE) 50 MCG/ACT nasal spray SPRAY TWO SPRAYS IN EACH NOSTRIL AS DIRECTED BY THE PROVIDER ONCE DAILY   • ibuprofen (ADVIL,MOTRIN) 800 MG tablet Take 800 mg by mouth Every 6 (Six) Hours As Needed for Mild Pain .   • nicotine (NICODERM CQ) 21 MG/24HR patch  "Place 1 patch on the skin as directed by provider Daily.   • [DISCONTINUED] albuterol (PROVENTIL HFA;VENTOLIN HFA) 108 (90 Base) MCG/ACT inhaler Inhale 2 puffs Every 6 (Six) Hours As Needed for Wheezing.   • [DISCONTINUED] atorvastatin (LIPITOR) 10 MG tablet Take 1 tablet by mouth Daily.   • [DISCONTINUED] Fluticasone Furoate-Vilanterol (BREO ELLIPTA) 200-25 MCG/INH aerosol powder  Inhale 1 inhaler Daily.   • [DISCONTINUED] hydrochlorothiazide (HYDRODIURIL) 25 MG tablet Take 1 tablet by mouth Daily.   • [DISCONTINUED] QUEtiapine (SEROquel) 50 MG tablet Take 1 tablet by mouth Every Night.   • [DISCONTINUED] rOPINIRole (REQUIP) 0.25 MG tablet TAKE ONE TABLET BY MOUTH ONCE NIGHTLY ONE HOUR BEFORE BEDTIME     No current facility-administered medications on file prior to visit.      She is allergic to codeine..    Review of Systems   Constitutional: Positive for irritability. Negative for chills and diaphoresis.   HENT: Positive for congestion, ear pain, sinus pressure and sore throat.    Eyes: Negative.    Respiratory: Negative.    Cardiovascular: Negative.    Gastrointestinal: Negative.    Genitourinary: Negative.    Musculoskeletal: Negative.    Skin: Negative.    Neurological: Positive for headaches.   Psychiatric/Behavioral: Positive for decreased concentration. The patient is nervous/anxious.        Objective    Visit Vitals  /76   Ht 162.6 cm (64\")   Wt 57.8 kg (127 lb 6.4 oz)   LMP  (LMP Unknown)   BMI 21.87 kg/m²       Physical Exam   Constitutional: She is oriented to person, place, and time. She appears well-developed and well-nourished.   HENT:   Head: Normocephalic.   Right Ear: External ear normal.   Left Ear: External ear normal.   Eyes: EOM are normal. Pupils are equal, round, and reactive to light.   Neck: Normal range of motion. Neck supple.   Cardiovascular: Normal rate, regular rhythm and normal heart sounds.   Pulmonary/Chest: Effort normal and breath sounds normal.   Abdominal: Soft. Bowel " sounds are normal.   Musculoskeletal: Normal range of motion.   Neurological: She is alert and oriented to person, place, and time.   Skin: Skin is warm. Capillary refill takes less than 2 seconds.   Psychiatric: She has a normal mood and affect. Her behavior is normal.   Nursing note and vitals reviewed.      Assessment/Plan   Problems Addressed this Visit        Cardiovascular and Mediastinum    Hyperlipidemia    Relevant Medications    atorvastatin (LIPITOR) 10 MG tablet    Benign hypertension    Relevant Medications    hydroCHLOROthiazide (HYDRODIURIL) 25 MG tablet       Respiratory    Chronic obstructive lung disease (CMS/HCC)    Relevant Medications    albuterol sulfate  (90 Base) MCG/ACT inhaler    Fluticasone Furoate-Vilanterol (BREO ELLIPTA) 200-25 MCG/INH inhaler       Other    Moderate episode of recurrent major depressive disorder (CMS/HCC)    Relevant Medications    hydrOXYzine pamoate (VISTARIL) 25 MG capsule    QUEtiapine (SEROquel) 50 MG tablet      Other Visit Diagnoses     Annual physical exam    -  Primary    Anxiety        Relevant Medications    hydrOXYzine pamoate (VISTARIL) 25 MG capsule    Acute non-recurrent maxillary sinusitis        Relevant Medications    amoxicillin-clavulanate (AUGMENTIN) 875-125 MG per tablet    Muscle cramping        Relevant Orders    CBC & Differential    Comprehensive Metabolic Panel    Vitamin D 25 hydroxy    Vitamin B12    Screening for hypercholesterolemia        Relevant Orders    Lipid panel    Screening for hypothyroidism        Relevant Orders    TSH    Restless leg syndrome        Relevant Medications    rOPINIRole (REQUIP) 0.25 MG tablet        New Medications Ordered This Visit   Medications   • hydrOXYzine pamoate (VISTARIL) 25 MG capsule     Sig: Take 1 capsule by mouth 3 (Three) Times a Day As Needed for Itching.     Dispense:  90 capsule     Refill:  3   • amoxicillin-clavulanate (AUGMENTIN) 875-125 MG per tablet     Sig: Take 1 tablet by mouth  2 (Two) Times a Day.     Dispense:  20 tablet     Refill:  0   • albuterol sulfate  (90 Base) MCG/ACT inhaler     Sig: Inhale 2 puffs Every 6 (Six) Hours As Needed for Wheezing.     Dispense:  1 inhaler     Refill:  11   • atorvastatin (LIPITOR) 10 MG tablet     Sig: Take 1 tablet by mouth Daily.     Dispense:  90 tablet     Refill:  1   • hydroCHLOROthiazide (HYDRODIURIL) 25 MG tablet     Sig: Take 1 tablet by mouth Daily.     Dispense:  90 tablet     Refill:  1   • QUEtiapine (SEROquel) 50 MG tablet     Sig: Take 1 tablet by mouth Every Night.     Dispense:  90 tablet     Refill:  1   • rOPINIRole (REQUIP) 0.25 MG tablet     Sig: Take 1 tablet by mouth Every Night. Take 1 hour before bedtime.     Dispense:  90 tablet     Refill:  1   • Fluticasone Furoate-Vilanterol (BREO ELLIPTA) 200-25 MCG/INH inhaler     Sig: Inhale 1 puff Daily.     Dispense:  1 each     Refill:  11     Replace Symbicort       1.  Annual physical exam:  Continue on current medications as previously prescribed   I spent minutes in direct face to face contact with patient.  Greater than 50% of this time was spent counseling patient and discussing plan of care.  Return in about 6 months (around 4/28/2020) for Recheck.    2.  Benign hypertension:  Continue on HCTZ as previously prescribed and refill prescription sent to pharmacy  Complete CBC and chemistry panel as ordered and will notify of results when available  Reduce sodium intake to no more than 2000 mg per day  Encouraged increased physical activity regimen such as walking to help promote cardiovascular health     3.  Hyperlipidemia:  Continue on Lipitor as previously prescribed and refill prescription sent to pharmacy  Complete fasting lipid panel as ordered and will notify of results when available  Encouraged to adhere to low-fat diet     4.  Altered episode of recurrent major depressive disorder:  Continue on Seroquel as previously prescribed and refill prescription sent to  pharmacy  Encouraged to seek emergency medical treatment for any new or worsening signs of depression such as suicidal/homicidal ideations     5. COPD  Continue on albuterol inhaler as previously prescribed and refill prescription sent to pharmacy  Continue on Breo as previously prescribed and refill prescription sent to pharmacy  Strongly encouraged smoking cessation at this time but declines     6.  Restless leg syndrome:  Continue on Requip as previously prescribed and refill prescription sent to pharmacy  Educated on possible side effects of this medication including but not limited to increased risk for drowsiness and sedation    7.  Anxiety:  Begin Vistaril as prescribed to be taken 1 p.o. 3 times daily as needed  Educated on possible side effects of this medication including but not limited to increased risk for drowsiness, sedation    8.  Muscle cramping:  Complete CBC, chemistry panel, vitamin D and vitamin B12 level as ordered and will notify results when available  Encouraged to increase fluids to help promote hydration  Encouraged warm water soaks for muscle relaxation    9.  Acute maxillary sinusitis:  Begin Augmentin as prescribed to be taken 1 p.o. twice daily x10 days  Educated on importance of taking full dose of antibiotic therapy  May use Flonase OTC according to package directions    10.  Screening for hypercholesterolemia:  Complete fasting lipid as ordered and will notify results when available  Encouraged to adhere low-fat diet    11.  Screening for hypothyroidism:  Complete TSH as ordered and will notify results when available        This document has been electronically signed by JAI Mccurdy on October 28, 2019 1:28 PM

## 2019-10-31 ENCOUNTER — LAB (OUTPATIENT)
Dept: LAB | Facility: HOSPITAL | Age: 54
End: 2019-10-31

## 2019-10-31 DIAGNOSIS — Z13.29 SCREENING FOR HYPOTHYROIDISM: ICD-10-CM

## 2019-10-31 DIAGNOSIS — Z13.220 SCREENING FOR HYPERCHOLESTEROLEMIA: ICD-10-CM

## 2019-10-31 DIAGNOSIS — R25.2 MUSCLE CRAMPING: ICD-10-CM

## 2019-10-31 LAB
25(OH)D3 SERPL-MCNC: 24.9 NG/ML (ref 30–100)
ALBUMIN SERPL-MCNC: 4.2 G/DL (ref 3.5–5.2)
ALBUMIN/GLOB SERPL: 1.4 G/DL
ALP SERPL-CCNC: 61 U/L (ref 39–117)
ALT SERPL W P-5'-P-CCNC: 20 U/L (ref 1–33)
ANION GAP SERPL CALCULATED.3IONS-SCNC: 10.1 MMOL/L (ref 5–15)
AST SERPL-CCNC: 19 U/L (ref 1–32)
BASOPHILS # BLD AUTO: 0.04 10*3/MM3 (ref 0–0.2)
BASOPHILS NFR BLD AUTO: 0.6 % (ref 0–1.5)
BILIRUB SERPL-MCNC: 0.3 MG/DL (ref 0.2–1.2)
BUN BLD-MCNC: 9 MG/DL (ref 6–20)
BUN/CREAT SERPL: 8.5 (ref 7–25)
CALCIUM SPEC-SCNC: 9.8 MG/DL (ref 8.6–10.5)
CHLORIDE SERPL-SCNC: 101 MMOL/L (ref 98–107)
CHOLEST SERPL-MCNC: 246 MG/DL (ref 0–200)
CO2 SERPL-SCNC: 26.9 MMOL/L (ref 22–29)
CREAT BLD-MCNC: 1.06 MG/DL (ref 0.57–1)
DEPRECATED RDW RBC AUTO: 43 FL (ref 37–54)
EOSINOPHIL # BLD AUTO: 0.17 10*3/MM3 (ref 0–0.4)
EOSINOPHIL NFR BLD AUTO: 2.4 % (ref 0.3–6.2)
ERYTHROCYTE [DISTWIDTH] IN BLOOD BY AUTOMATED COUNT: 12.1 % (ref 12.3–15.4)
GFR SERPL CREATININE-BSD FRML MDRD: 54 ML/MIN/1.73
GLOBULIN UR ELPH-MCNC: 2.9 GM/DL
GLUCOSE BLD-MCNC: 98 MG/DL (ref 65–99)
HCT VFR BLD AUTO: 45.2 % (ref 34–46.6)
HDLC SERPL-MCNC: 52 MG/DL (ref 40–60)
HGB BLD-MCNC: 15.6 G/DL (ref 12–15.9)
IMM GRANULOCYTES # BLD AUTO: 0.02 10*3/MM3 (ref 0–0.05)
IMM GRANULOCYTES NFR BLD AUTO: 0.3 % (ref 0–0.5)
LDLC SERPL CALC-MCNC: 162 MG/DL (ref 0–100)
LDLC/HDLC SERPL: 3.11 {RATIO}
LYMPHOCYTES # BLD AUTO: 1.69 10*3/MM3 (ref 0.7–3.1)
LYMPHOCYTES NFR BLD AUTO: 24 % (ref 19.6–45.3)
MCH RBC QN AUTO: 33.2 PG (ref 26.6–33)
MCHC RBC AUTO-ENTMCNC: 34.5 G/DL (ref 31.5–35.7)
MCV RBC AUTO: 96.2 FL (ref 79–97)
MONOCYTES # BLD AUTO: 0.42 10*3/MM3 (ref 0.1–0.9)
MONOCYTES NFR BLD AUTO: 6 % (ref 5–12)
NEUTROPHILS # BLD AUTO: 4.69 10*3/MM3 (ref 1.7–7)
NEUTROPHILS NFR BLD AUTO: 66.7 % (ref 42.7–76)
NRBC BLD AUTO-RTO: 0 /100 WBC (ref 0–0.2)
PLATELET # BLD AUTO: 181 10*3/MM3 (ref 140–450)
PMV BLD AUTO: 10.6 FL (ref 6–12)
POTASSIUM BLD-SCNC: 4.7 MMOL/L (ref 3.5–5.2)
PROT SERPL-MCNC: 7.1 G/DL (ref 6–8.5)
RBC # BLD AUTO: 4.7 10*6/MM3 (ref 3.77–5.28)
SODIUM BLD-SCNC: 138 MMOL/L (ref 136–145)
TRIGL SERPL-MCNC: 162 MG/DL (ref 0–150)
TSH SERPL DL<=0.05 MIU/L-ACNC: 4.46 UIU/ML (ref 0.27–4.2)
VIT B12 BLD-MCNC: 393 PG/ML (ref 211–946)
VLDLC SERPL-MCNC: 32.4 MG/DL (ref 5–40)
WBC NRBC COR # BLD: 7.03 10*3/MM3 (ref 3.4–10.8)

## 2019-10-31 PROCEDURE — 82607 VITAMIN B-12: CPT

## 2019-10-31 PROCEDURE — 80053 COMPREHEN METABOLIC PANEL: CPT

## 2019-10-31 PROCEDURE — 84443 ASSAY THYROID STIM HORMONE: CPT

## 2019-10-31 PROCEDURE — 82306 VITAMIN D 25 HYDROXY: CPT

## 2019-10-31 PROCEDURE — 80061 LIPID PANEL: CPT

## 2019-10-31 PROCEDURE — 85025 COMPLETE CBC W/AUTO DIFF WBC: CPT

## 2019-11-04 DIAGNOSIS — E55.9 VITAMIN D DEFICIENCY: Primary | ICD-10-CM

## 2019-11-04 DIAGNOSIS — E78.2 MIXED HYPERLIPIDEMIA: Primary | ICD-10-CM

## 2019-11-04 RX ORDER — ERGOCALCIFEROL 1.25 MG/1
50000 CAPSULE ORAL WEEKLY
Qty: 12 CAPSULE | Refills: 3 | Status: SHIPPED | OUTPATIENT
Start: 2019-11-04 | End: 2020-11-28 | Stop reason: SDUPTHER

## 2019-11-04 RX ORDER — ATORVASTATIN CALCIUM 20 MG/1
20 TABLET, FILM COATED ORAL DAILY
Qty: 90 TABLET | Refills: 2 | Status: SHIPPED | OUTPATIENT
Start: 2019-11-04 | End: 2020-12-14 | Stop reason: SDUPTHER

## 2019-11-06 ENCOUNTER — TELEPHONE (OUTPATIENT)
Dept: FAMILY MEDICINE CLINIC | Facility: CLINIC | Age: 54
End: 2019-11-06

## 2019-11-06 NOTE — TELEPHONE ENCOUNTER
Per JAI Gardiner, Ms. Collins has been called with recent lab results & recommendations.  Continue current medications and follow-up as planned or sooner if any problems.    Georgina  Ms. Collins states she is still having problems with a sinus headache.    She states she is taking the medication that you gave her but she is still having the headaches.  I told her I would let you know  I also told her that unfortunately until the weather stabilizes she may continue to have headaches.  I did recommend a cool mist humidifier and Ocean Nasal spray to help keep things from drying out.  I also told her not to use the Flonase anymore that it was prescribed.   And to Increase her water intake as well.    Please Advise if any futher          ----- Message from JAI Mccurdy sent at 11/4/2019  1:45 PM CST -----  Vitamin D level was low at 24.9.  I have sent in a prescription for vitamin D to her pharmacy.  She will take this once a week.  B12 level was within normal limits.  TSH, while still elevated, has improved and is now down to 4.46.  Sugar, sodium, potassium, kidney function and liver functions were within normal limits.  Total cholesterol has elevated and is now up to 246 and triglycerides are elevated at 162.  Bad cholesterol is too high at 162.  I need to increase her Lipitor from 10 mg p.o. daily to 20 mg p.o. daily and I have sent a new prescription to her pharmacy.  She also needs to adhere to a low-fat diet which will help to improve these numbers.  We will recheck a fasting lipid panel on her next appointment in April.

## 2019-11-06 NOTE — PROGRESS NOTES
Per JAI Gardiner, Ms. Collins has been called with recent lab results & recommendations.  Continue current medications and follow-up as planned or sooner if any problems.    Georgina,  Ms. Collins states she is still having problems with a sinus headache.    She states she is taking the medication that you gave her but she is still having the headaches.  I told her I would let you know  I also told her that unfortunately until the weather stabilizes she may continue to have headaches.  I did recommend a cool mist humidifier and Ocean Nasal spray to help keep things from drying out.  I also told her not to use the Flonase anymore that it was prescribed.   And to Increase her water intake as well.    Please Advise if any futher

## 2019-11-07 DIAGNOSIS — G25.81 RESTLESS LEG SYNDROME: ICD-10-CM

## 2019-11-07 RX ORDER — ROPINIROLE 0.25 MG/1
0.25 TABLET, FILM COATED ORAL NIGHTLY
Qty: 90 TABLET | Refills: 1 | Status: SHIPPED | OUTPATIENT
Start: 2019-11-07 | End: 2020-07-28

## 2020-04-28 ENCOUNTER — TELEMEDICINE (OUTPATIENT)
Dept: FAMILY MEDICINE CLINIC | Facility: CLINIC | Age: 55
End: 2020-04-28

## 2020-04-28 DIAGNOSIS — F33.1 MODERATE EPISODE OF RECURRENT MAJOR DEPRESSIVE DISORDER (HCC): ICD-10-CM

## 2020-04-28 DIAGNOSIS — I10 BENIGN HYPERTENSION: Primary | ICD-10-CM

## 2020-04-28 DIAGNOSIS — J01.01 ACUTE RECURRENT MAXILLARY SINUSITIS: ICD-10-CM

## 2020-04-28 DIAGNOSIS — J44.1 CHRONIC OBSTRUCTIVE PULMONARY DISEASE WITH ACUTE EXACERBATION (HCC): ICD-10-CM

## 2020-04-28 DIAGNOSIS — E78.49 OTHER HYPERLIPIDEMIA: ICD-10-CM

## 2020-04-28 PROCEDURE — 99214 OFFICE O/P EST MOD 30 MIN: CPT | Performed by: NURSE PRACTITIONER

## 2020-04-28 RX ORDER — CEFDINIR 300 MG/1
300 CAPSULE ORAL 2 TIMES DAILY
Qty: 20 CAPSULE | Refills: 0 | Status: SHIPPED | OUTPATIENT
Start: 2020-04-28 | End: 2021-01-04

## 2020-04-28 RX ORDER — BUDESONIDE AND FORMOTEROL FUMARATE DIHYDRATE 160; 4.5 UG/1; UG/1
2 AEROSOL RESPIRATORY (INHALATION)
Qty: 1 INHALER | Refills: 12 | Status: SHIPPED | OUTPATIENT
Start: 2020-04-28 | End: 2020-04-29

## 2020-04-28 NOTE — PROGRESS NOTES
"Subjective   Patricia Collins is a 54 y.o. female.  6-month follow-up for high blood pressure, high cholesterol, COPD and depression.  Has had sinus pressure and pain for the last 6 months.  \"It just feels so bad around the right side of my cheek behind my eye and up into my temple.\"  Has been on Augmentin in the past with little relief.    Hypertension   This is a chronic problem. The current episode started more than 1 year ago. The problem is unchanged. The problem is controlled. Associated symptoms include anxiety and headaches. Pertinent negatives include no shortness of breath or sweats. There are no associated agents to hypertension. Risk factors for coronary artery disease include stress, post-menopausal state and smoking/tobacco exposure. Current antihypertension treatment includes diuretics. The current treatment provides moderate improvement. There are no compliance problems.  There is no history of kidney disease, CAD/MI, CVA, heart failure, left ventricular hypertrophy or retinopathy.   Depression   Visit Type: follow-up  Patient presents with the following symptoms: muscle tension and restlessness.  Patient is not experiencing: anhedonia, chest pain, compulsions, confusion, depressed mood, dizziness, excessive worry, feelings of hopelessness, feelings of worthlessness, hypersomnia, hyperventilation, malaise, memory impairment, nausea, obsessions, panic, psychomotor agitation, psychomotor retardation, shortness of breath, suicidal planning and thoughts of death.  Frequency of symptoms: occasionally   Severity: moderate   Sleep quality: non-restorative  Nighttime awakenings: several  Compliance with medications:  %        Hyperlipidemia   This is a chronic problem. The current episode started more than 1 year ago. The problem is controlled. Factors aggravating her hyperlipidemia include smoking and thiazides. Pertinent negatives include no shortness of breath. Current antihyperlipidemic " treatment includes statins. The current treatment provides significant improvement of lipids.   COPD   There is no cough or shortness of breath. This is a chronic problem. The current episode started more than 1 year ago. The problem occurs constantly. The problem has been waxing and waning. Associated symptoms include headaches. Pertinent negatives include no fever or sweats. Exacerbated by: smokin. She reports moderate improvement on treatment.        The following portions of the patient's history were reviewed and updated as appropriate:   Current Outpatient Medications   Medication Sig Dispense Refill   • albuterol sulfate  (90 Base) MCG/ACT inhaler Inhale 2 puffs Every 6 (Six) Hours As Needed for Wheezing. 1 inhaler 11   • atorvastatin (LIPITOR) 20 MG tablet Take 1 tablet by mouth Daily. 90 tablet 2   • betamethasone, augmented, (DIPROLENE) 0.05 % ointment Apply  topically to the appropriate area as directed Daily. 50 g 2   • budesonide-formoterol (Symbicort) 160-4.5 MCG/ACT inhaler Inhale 2 puffs 2 (Two) Times a Day. 1 inhaler 12   • cefdinir (OMNICEF) 300 MG capsule Take 1 capsule by mouth 2 (Two) Times a Day. 20 capsule 0   • ergocalciferol (ERGOCALCIFEROL) 1.25 MG (28905 UT) capsule Take 1 capsule by mouth 1 (One) Time Per Week. 12 capsule 3   • fluticasone (FLONASE) 50 MCG/ACT nasal spray SPRAY TWO SPRAYS IN EACH NOSTRIL AS DIRECTED BY THE PROVIDER ONCE DAILY 1 bottle 5   • hydroCHLOROthiazide (HYDRODIURIL) 25 MG tablet Take 1 tablet by mouth Daily. 90 tablet 1   • hydrOXYzine pamoate (VISTARIL) 25 MG capsule Take 1 capsule by mouth 3 (Three) Times a Day As Needed for Itching. 90 capsule 3   • ibuprofen (ADVIL,MOTRIN) 800 MG tablet Take 800 mg by mouth Every 6 (Six) Hours As Needed for Mild Pain .     • nicotine (NICODERM CQ) 21 MG/24HR patch Place 1 patch on the skin as directed by provider Daily. 30 patch 2   • QUEtiapine (SEROquel) 50 MG tablet Take 1 tablet by mouth Every Night. 90 tablet 1   •  rOPINIRole (REQUIP) 0.25 MG tablet Take 1 tablet by mouth Every Night. Take 1 hour before bedtime. 90 tablet 1     No current facility-administered medications for this visit.      Current Outpatient Medications on File Prior to Visit   Medication Sig   • albuterol sulfate  (90 Base) MCG/ACT inhaler Inhale 2 puffs Every 6 (Six) Hours As Needed for Wheezing.   • atorvastatin (LIPITOR) 20 MG tablet Take 1 tablet by mouth Daily.   • betamethasone, augmented, (DIPROLENE) 0.05 % ointment Apply  topically to the appropriate area as directed Daily.   • ergocalciferol (ERGOCALCIFEROL) 1.25 MG (40491 UT) capsule Take 1 capsule by mouth 1 (One) Time Per Week.   • fluticasone (FLONASE) 50 MCG/ACT nasal spray SPRAY TWO SPRAYS IN EACH NOSTRIL AS DIRECTED BY THE PROVIDER ONCE DAILY   • hydroCHLOROthiazide (HYDRODIURIL) 25 MG tablet Take 1 tablet by mouth Daily.   • hydrOXYzine pamoate (VISTARIL) 25 MG capsule Take 1 capsule by mouth 3 (Three) Times a Day As Needed for Itching.   • ibuprofen (ADVIL,MOTRIN) 800 MG tablet Take 800 mg by mouth Every 6 (Six) Hours As Needed for Mild Pain .   • nicotine (NICODERM CQ) 21 MG/24HR patch Place 1 patch on the skin as directed by provider Daily.   • QUEtiapine (SEROquel) 50 MG tablet Take 1 tablet by mouth Every Night.   • rOPINIRole (REQUIP) 0.25 MG tablet Take 1 tablet by mouth Every Night. Take 1 hour before bedtime.   • [DISCONTINUED] amoxicillin-clavulanate (AUGMENTIN) 875-125 MG per tablet Take 1 tablet by mouth 2 (Two) Times a Day.   • [DISCONTINUED] Fluticasone Furoate-Vilanterol (BREO ELLIPTA) 200-25 MCG/INH inhaler Inhale 1 puff Daily.     No current facility-administered medications on file prior to visit.      She is allergic to codeine.    .    Review of Systems   Constitutional: Negative.  Negative for fever.   HENT: Positive for sinus pressure and sinus pain.    Eyes: Positive for pain.   Respiratory: Negative.  Negative for cough and shortness of breath.     Cardiovascular: Negative.    Gastrointestinal: Negative.    Genitourinary: Negative.    Musculoskeletal: Negative.    Skin: Negative.    Neurological: Positive for headaches.   Psychiatric/Behavioral: Negative.  Negative for confusion.       Objective   Physical Exam   Constitutional: She is oriented to person, place, and time. She appears well-developed and well-nourished.   Patient directed physical exam   HENT:   Nose: Right sinus exhibits maxillary sinus tenderness and frontal sinus tenderness.   Eyes: Lids are normal.   Neck: Normal range of motion.   Pulmonary/Chest: Effort normal.   Abdominal: Soft.   Neurological: She is alert and oriented to person, place, and time.   Skin: Skin is warm. Capillary refill takes less than 2 seconds.   Psychiatric: She has a normal mood and affect. Her behavior is normal.       Assessment/Plan   Problems Addressed this Visit        Cardiovascular and Mediastinum    Hyperlipidemia    Benign hypertension - Primary       Respiratory    Chronic obstructive lung disease (CMS/HCC)    Relevant Medications    budesonide-formoterol (Symbicort) 160-4.5 MCG/ACT inhaler       Other    Moderate episode of recurrent major depressive disorder (CMS/HCC)      Other Visit Diagnoses     Acute recurrent maxillary sinusitis        Relevant Medications    cefdinir (OMNICEF) 300 MG capsule    Other Relevant Orders    CT Sinus Without Contrast        1.  Benign hypertension:  Continue on hydrochlorothiazide as previously prescribed  Encouraged to reduce sodium intake to no more than 2000 mg/day  Encouraged to get up and walk at least every 2 hours to maintain cardiovascular health during COVID-19 pandemic  Discussed ways to reduce sodium diet such as using salt substitute over table salt (Mrs. Brown)  Encouraged to avoid high sodium content snacks such as potato chips, cottage cheese and microwavable meals    2.  Hyperlipidemia:  Continue on Lipitor as previously prescribed  Encouraged to reduce  saturated fats in diet  Discussed ways to reduce fats in diet such as baking or air frying as opposed to deep frying    3.  COPD:  Discontinue Breo due to formulary change  Begin Symbicort as prescribed  Educated on possible side effects of this medication including but not limited to increased risk for bronchospasm, nasopharyngitis, sinusitis and oral candidiasis  Strongly encouraged to rinse mouth and inhaler out after each use    4.  Moderate episode of recurrent major depressive disorder:  Continue on Vistaril and Seroquel as previously prescribed  Discussed stress relieving technique such as deep breathing, guided imagery  Encouraged to seek emergency medical treatment for any new or worsening signs of depression such as feelings of worthlessness or hopelessness, thoughts of self-harm    5.  Acute recurrent maxillary sinusitis:  Begin Omnicef as prescribed  Educated on importance of completing full dose of antibiotic therapy  May use Flonase OTC according to package directions  May use nasal mist OTC according to package directions  Radiology will call to schedule CT of the sinuses and will notify results when available    Continue on current medications as previously prescribed   This was an audio and video enabled telemedicine encounter.  I spent 25 minutes charting and in video face to face contact with patient.  Greater than 50% of this time was spent counseling patient and discussing plan of care.  Return in about 3 months (around 7/28/2020) for Recheck.        This document has been electronically signed by JAI Mccurdy on April 28, 2020 08:36

## 2020-04-29 DIAGNOSIS — J44.1 CHRONIC OBSTRUCTIVE PULMONARY DISEASE WITH ACUTE EXACERBATION (HCC): Primary | ICD-10-CM

## 2020-04-30 RX ORDER — BETAMETHASONE DIPROPIONATE 0.5 MG/G
OINTMENT TOPICAL DAILY
Qty: 50 G | Refills: 2 | Status: SHIPPED | OUTPATIENT
Start: 2020-04-30 | End: 2020-07-28 | Stop reason: SDUPTHER

## 2020-05-21 ENCOUNTER — TELEPHONE (OUTPATIENT)
Dept: FAMILY MEDICINE CLINIC | Facility: CLINIC | Age: 55
End: 2020-05-21

## 2020-05-21 ENCOUNTER — HOSPITAL ENCOUNTER (OUTPATIENT)
Dept: CT IMAGING | Facility: HOSPITAL | Age: 55
Discharge: HOME | End: 2020-05-21
Admitting: NURSE PRACTITIONER

## 2020-05-21 DIAGNOSIS — J32.9 RECURRENT SINUSITIS: Primary | ICD-10-CM

## 2020-05-21 DIAGNOSIS — J01.01 ACUTE RECURRENT MAXILLARY SINUSITIS: ICD-10-CM

## 2020-05-21 PROCEDURE — 70486 CT MAXILLOFACIAL W/O DYE: CPT

## 2020-05-21 NOTE — PROGRESS NOTES
Per JAI Gardiner, Ms. Collins has been called with recent Sinus CT results & recommendations.  Continue current medications and follow-up as planned or sooner if any problems.    Georgina,   She said she has finished the antibiotics and is still having problems.   She said to go ahead and refer her to ENT  She has no preference, first available is fine  I told her someone would call her net week from ENT

## 2020-06-11 ENCOUNTER — OFFICE VISIT (OUTPATIENT)
Dept: OTOLARYNGOLOGY | Facility: CLINIC | Age: 55
End: 2020-06-11

## 2020-06-11 VITALS — BODY MASS INDEX: 20.96 KG/M2 | TEMPERATURE: 97.5 F | HEIGHT: 64 IN | WEIGHT: 122.8 LBS

## 2020-06-11 DIAGNOSIS — J34.3 NASAL TURBINATE HYPERTROPHY: Primary | ICD-10-CM

## 2020-06-11 DIAGNOSIS — J34.89 CONCHA BULLOSA: ICD-10-CM

## 2020-06-11 DIAGNOSIS — Z72.0 TOBACCO ABUSE: ICD-10-CM

## 2020-06-11 DIAGNOSIS — G44.89 OTHER HEADACHE SYNDROME: ICD-10-CM

## 2020-06-11 DIAGNOSIS — J34.2 DEVIATED SEPTUM: ICD-10-CM

## 2020-06-11 PROCEDURE — 99204 OFFICE O/P NEW MOD 45 MIN: CPT | Performed by: OTOLARYNGOLOGY

## 2020-06-11 RX ORDER — AZELASTINE 1 MG/ML
2 SPRAY, METERED NASAL 2 TIMES DAILY
Qty: 30 ML | Refills: 12 | Status: SHIPPED | OUTPATIENT
Start: 2020-06-11 | End: 2021-01-21 | Stop reason: SDUPTHER

## 2020-06-11 NOTE — PROGRESS NOTES
"Subjective   Patricia Collins is a 54 y.o. female.   \"\" Sinus headaches  History of Present Illness   Patient comes in when she says sinus headache she is had a CT which was negative she is been on 2 courses antibiotics without success short course of Flonase nasal saline she has some trouble breathing through her nose and headaches are variable throughout the day can be worse later in the day earlier in the day she denies any postnasal drip no drainage no fever no chills present treatments unfortunately been unsuccessful she is a heavy smoker and is not stop smoking      The following portions of the patient's history were reviewed and updated as appropriate: allergies, current medications, past family history, past medical history, past social history, past surgical history and problem list.      Patricia Collins reports that she has been smoking cigarettes. She has been smoking about 1.00 pack per day. She has never used smokeless tobacco. She reports that she drinks alcohol. She reports that she does not use drugs.  Patient is a tobacco user and has been counseled for use of tobacco products    Family History   Problem Relation Age of Onset   • Hypertension Mother    • Stroke Mother    • Hyperlipidemia Mother    • Osteoarthritis Mother    • Rectal cancer Father    • Heart failure Sister    • Arrhythmia Maternal Grandmother          Current Outpatient Medications:   •  albuterol sulfate  (90 Base) MCG/ACT inhaler, Inhale 2 puffs Every 6 (Six) Hours As Needed for Wheezing., Disp: 1 inhaler, Rfl: 11  •  atorvastatin (LIPITOR) 20 MG tablet, Take 1 tablet by mouth Daily., Disp: 90 tablet, Rfl: 2  •  betamethasone, augmented, (DIPROLENE) 0.05 % ointment, Apply  topically to the appropriate area as directed Daily., Disp: 50 g, Rfl: 2  •  ergocalciferol (ERGOCALCIFEROL) 1.25 MG (13895 UT) capsule, Take 1 capsule by mouth 1 (One) Time Per Week., Disp: 12 capsule, Rfl: 3  •  fluticasone (FLONASE) 50 MCG/ACT " nasal spray, SPRAY TWO SPRAYS IN EACH NOSTRIL AS DIRECTED BY THE PROVIDER ONCE DAILY (Patient taking differently: 2 sprays into the nostril(s) as directed by provider Daily As Needed.), Disp: 1 bottle, Rfl: 5  •  fluticasone-salmeterol (Advair Diskus) 250-50 MCG/DOSE DISKUS, Inhale 1 puff 2 (Two) Times a Day., Disp: 60 each, Rfl: 5  •  hydroCHLOROthiazide (HYDRODIURIL) 25 MG tablet, Take 1 tablet by mouth Daily., Disp: 90 tablet, Rfl: 1  •  ibuprofen (ADVIL,MOTRIN) 800 MG tablet, Take 800 mg by mouth Every 6 (Six) Hours As Needed for Mild Pain ., Disp: , Rfl:   •  rOPINIRole (REQUIP) 0.25 MG tablet, Take 1 tablet by mouth Every Night. Take 1 hour before bedtime., Disp: 90 tablet, Rfl: 1  •  tiotropium (SPIRIVA) 18 MCG per inhalation capsule, Place 1 capsule into inhaler and inhale Daily., Disp: , Rfl:   •  azelastine (ASTELIN) 0.1 % nasal spray, 2 sprays into the nostril(s) as directed by provider 2 (Two) Times a Day. Use in each nostril as directed, Disp: 30 mL, Rfl: 12  •  cefdinir (OMNICEF) 300 MG capsule, Take 1 capsule by mouth 2 (Two) Times a Day., Disp: 20 capsule, Rfl: 0  •  hydrOXYzine pamoate (VISTARIL) 25 MG capsule, Take 1 capsule by mouth 3 (Three) Times a Day As Needed for Itching., Disp: 90 capsule, Rfl: 3  •  nicotine (NICODERM CQ) 21 MG/24HR patch, Place 1 patch on the skin as directed by provider Daily., Disp: 30 patch, Rfl: 2  •  QUEtiapine (SEROquel) 50 MG tablet, Take 1 tablet by mouth Every Night., Disp: 90 tablet, Rfl: 1    Allergies   Allergen Reactions   • Codeine Nausea And Vomiting       Past Medical History:   Diagnosis Date   • Acute frontal sinusitis    • Benign hypertension    • Breast mass     left breast, right breast with questionable node   • Chronic obstructive lung disease (CMS/HCC)    • Cough     likely secondary to chronic smoking   • Depression    • Hyperlipidemia    • Smokes        Past Surgical History:   Procedure Laterality Date   • BREAST BIOPSY Bilateral    •   SECTION     • COLONOSCOPY N/A 1/14/2019    Procedure: COLONOSCOPY;  Surgeon: Arun Bagley MD;  Location: [facility] ENDOSCOPY;  Service: General   • CYSTOSCOPY N/A 2/21/2018    Procedure: CYSTOSCOPY; URETHRAL DILATION;  Surgeon: Mychal Cabrera MD;  Location: [facility] OR;  Service:    • DENTAL PROCEDURE      full mouth extraction   • TUBAL ABDOMINAL LIGATION      post partum       Review of Systems   HENT: Positive for facial swelling, sinus pressure and sinus pain.    Musculoskeletal: Positive for arthralgias, back pain, neck pain and neck stiffness.   Skin: Positive for rash.   Neurological: Positive for headaches.   Hematological: Bruises/bleeds easily.   All other systems reviewed and are negative.          Objective   Physical Exam   Constitutional: She is oriented to person, place, and time. She appears well-developed.   HENT:   Head: Normocephalic.   Right Ear: Hearing and external ear normal.   Left Ear: Hearing and external ear normal.   Nose: Mucosal edema, nasal deformity and septal deviation present.       Mouth/Throat: Oropharynx is clear and moist.   Eyes: Conjunctivae are normal.   Neck: Neck supple.   Pulmonary/Chest: Effort normal.   Neurological: She is alert and oriented to person, place, and time.   Skin: Skin is warm.   Psychiatric: She has a normal mood and affect. Thought content normal.   Vitals reviewed.      Actual CT was reviewed by me and shown to the patient with her observing the scan     Study Result     CT sinuses.      CLINICAL INDICATION: Sinusitis.    COMPARISON: None     TECHNIQUE:  Noncontrast helical scanning through the sinuses and  facial bones using bone technique. Axial, sagittal, coronal  reformations. Bone and soft tissue windows reviewed.     This exam was performed according to our departmental  dose-optimization program, which includes automated exposure  control, adjustment of the mA and/or kV according to patient size  and/or use of iterative reconstruction  technique.     FINDINGS: Normal paranasal sinuses. No opacification,  mucoperiosteal thickening or air-fluid levels. There are no bony  abnormalities. Ostiomeatal complexes are normal, symmetrical and  patent. Small sasha bullosa left superior nasal turbinate.     IMPRESSION:  Small sasha bullosa superior left nasal turbinate.      Otherwise unremarkable exam. Sinuses are unremarkable.     Electronically signed by:  Khoi Dexter MD  5/21/2020 10:49 AM CDT  Workstation: [id number]   Imaging     CT Sinus Without Contrast (Order: [id number]) - 5/21/2020   Result History     CT Sinus Without Contrast (Order #[id number]) on 5/21/2020 - Order Result History Report   Reprint Order Requisition     CT Sinus Without Contrast (Order #[id number]) on 5/21/20   Result Notes for CT Sinus Without Contrast     Notes recorded by Eleonora Boswell LPN on 5/21/2020 at 2:31 PM CDT  Per JAI Gardiner, Ms. Collins has been called with recent Sinus CT results & recommendations.  Continue current medications and follow-up as planned or sooner if any problems.    Georgina,   She said she has finished the antibiotics and is still having problems.   She said to go ahead and refer her to ENT  She has no preference, first available is fine  I told her someone would call her net week from ENT  ------    Notes recorded by Georgina Mcrae APRN on 5/21/2020 at 12:41 PM CDT  CT scan did show a sasha bullosa (air-filled cavity) which is a normal anatomic variant in some people.  After the completion of the antibiotic if she is not had any improvement or the symptoms return I will refer her to ENT for further evaluation of recurrent maxillary sinusitis.          Signed by     Signed Date/Time  Phone Pager   KHOI DEXTER 5/21/2020 10:49 [phone]    Exam Information            Assessment/Plan   Patricia was seen today for sinus problem.    Diagnoses and all orders for this visit:    Nasal turbinate hypertrophy    Deviated septum    Sasha bullosa    Other  headache syndrome    Tobacco abuse    Other orders  -     azelastine (ASTELIN) 0.1 % nasal spray; 2 sprays into the nostril(s) as directed by provider 2 (Two) Times a Day. Use in each nostril as directed      Answers for HPI/ROS submitted by the patient on 6/9/2020   What is the primary reason for your visit?: Other  Please describe your symptoms.: Headaches, sinus pressure  Have you had these symptoms before?: Yes  How long have you been having these symptoms?: Greater than 2 weeks  Please list any medications you are currently taking for this condition.: Excedrine  Please describe any probable cause for these symptoms. : Sinuses    We will begin Astelin and add Flonase if no better in 2 weeks see no signs of active infection more pressure congestion headaches she can use a saline as well talked at length about smoking cessation discussed surgical options of the turbinates and septum and sasha bullosa but was suggested conservative therapy initially recheck 4 weeks she is to call any questions use and side effects and proper technique of medication were discussed    I will see her in 4 weeks

## 2020-06-11 NOTE — PATIENT INSTRUCTIONS
MyPlate from USDA    MyPlate is an outline of a general healthy diet based on the 2010 Dietary Guidelines for Americans, from the U.S. Department of Agriculture (USDA). It sets guidelines for how much food you should eat from each food group based on your age, sex, and level of physical activity.  What are tips for following MyPlate?  To follow MyPlate recommendations:  · Eat a wide variety of fruits and vegetables, grains, and protein foods.  · Serve smaller portions and eat less food throughout the day.  · Limit portion sizes to avoid overeating.  · Enjoy your food.  · Get at least 150 minutes of exercise every week. This is about 30 minutes each day, 5 or more days per week.  It can be difficult to have every meal look like MyPlate. Think about MyPlate as eating guidelines for an entire day, rather than each individual meal.  Fruits and vegetables  · Make half of your plate fruits and vegetables.  · Eat many different colors of fruits and vegetables each day.  · For a 2,000 calorie daily food plan, eat:  ? 2½ cups of vegetables every day.  ? 2 cups of fruit every day.  · 1 cup is equal to:  ? 1 cup raw or cooked vegetables.  ? 1 cup raw fruit.  ? 1 medium-sized orange, apple, or banana.  ? 1 cup 100% fruit or vegetable juice.  ? 2 cups raw leafy greens, such as lettuce, spinach, or kale.  ? ½ cup dried fruit.  Grains  · One fourth of your plate should be grains.  · Make at least half of the grains you eat each day whole grains.  · For a 2,000 calorie daily food plan, eat 6 oz of grains every day.  · 1 oz is equal to:  ? 1 slice bread.  ? 1 cup cereal.  ? ½ cup cooked rice, cereal, or pasta.  Protein  · One fourth of your plate should be protein.  · Eat a wide variety of protein foods, including meat, poultry, fish, eggs, beans, nuts, and tofu.  · For a 2,000 calorie daily food plan, eat 5½ oz of protein every day.  · 1 oz is equal to:  ? 1 oz meat, poultry, or fish.  ? ¼ cup cooked beans.  ? 1 egg.  ? ½ oz nuts  or seeds.  ? 1 Tbsp peanut butter.  Dairy  · Drink fat-free or low-fat (1%) milk.  · Eat or drink dairy as a side to meals.  · For a 2,000 calorie daily food plan, eat or drink 3 cups of dairy every day.  · 1 cup is equal to:  ? 1 cup milk, yogurt, cottage cheese, or soy milk (soy beverage).  ? 2 oz processed cheese.  ? 1½ oz natural cheese.  Fats, oils, salt, and sugars  · Only small amounts of oils are recommended.  · Avoid foods that are high in calories and low in nutritional value (empty calories), like foods high in fat or added sugars.  · Choose foods that are low in salt (sodium). Choose foods that have less than 140 milligrams (mg) of sodium per serving.  · Drink water instead of sugary drinks. Drink enough water each day to keep your urine pale yellow.  Where to find support  · Work with your health care provider or a nutrition specialist (dietitian) to develop a customized eating plan that is right for you.  · Download an santo (mobile application) to help you track your daily food intake.  Where to find more information  · Go to ChooseMyPlate.gov for more information.  · Learn more and log your daily food intake according to MyPlate using USDA's SuperTracker: www.[employer].usda.gov  Summary  · MyPlate is a general guideline for healthy eating from the USDA. It is based on the 2010 Dietary Guidelines for Americans.  · In general, fruits and vegetables should take up ½ of your plate, grains should take up ¼ of your plate, and protein should take up ¼ of your plate.  This information is not intended to replace advice given to you by your health care provider. Make sure you discuss any questions you have with your health care provider.  Document Released: 01/06/2009 Document Revised: 01/19/2019 Document Reviewed: 03/19/2018  Elsevier Patient Education © 2020 [employer]

## 2020-07-14 ENCOUNTER — OFFICE VISIT (OUTPATIENT)
Dept: OTOLARYNGOLOGY | Facility: CLINIC | Age: 55
End: 2020-07-14

## 2020-07-14 VITALS — HEIGHT: 64 IN | BODY MASS INDEX: 21.34 KG/M2 | TEMPERATURE: 97.8 F | WEIGHT: 125 LBS

## 2020-07-14 DIAGNOSIS — J34.3 NASAL TURBINATE HYPERTROPHY: Primary | ICD-10-CM

## 2020-07-14 DIAGNOSIS — J34.89 CONCHA BULLOSA: ICD-10-CM

## 2020-07-14 DIAGNOSIS — J34.2 DEVIATED SEPTUM: ICD-10-CM

## 2020-07-14 DIAGNOSIS — G44.89 OTHER HEADACHE SYNDROME: ICD-10-CM

## 2020-07-14 PROCEDURE — 99213 OFFICE O/P EST LOW 20 MIN: CPT | Performed by: OTOLARYNGOLOGY

## 2020-07-14 RX ORDER — AZELASTINE 1 MG/ML
2 SPRAY, METERED NASAL 2 TIMES DAILY
Qty: 30 ML | Refills: 5 | Status: SHIPPED | OUTPATIENT
Start: 2020-07-14 | End: 2021-01-04 | Stop reason: SDUPTHER

## 2020-07-14 RX ORDER — BUDESONIDE AND FORMOTEROL FUMARATE DIHYDRATE 80; 4.5 UG/1; UG/1
2 AEROSOL RESPIRATORY (INHALATION)
COMMUNITY
End: 2021-07-30 | Stop reason: SDUPTHER

## 2020-07-14 NOTE — PROGRESS NOTES
Subjective   Patricia Collins is a 55 y.o. female.     F/u nasal and facial symptoms  History of Present Illness   H/a facial pain much improved some nasal obstruction but much improved  Pt doing better than she expected. No nasal bleeding or unusual drainage  No fever chills and overall she feels better    The following portions of the patient's history were reviewed and updated as appropriate: allergies, current medications, past family history, past medical history, past social history, past surgical history and problem list.      Current Outpatient Medications:   •  albuterol sulfate  (90 Base) MCG/ACT inhaler, Inhale 2 puffs Every 6 (Six) Hours As Needed for Wheezing., Disp: 1 inhaler, Rfl: 11  •  atorvastatin (LIPITOR) 20 MG tablet, Take 1 tablet by mouth Daily., Disp: 90 tablet, Rfl: 2  •  azelastine (ASTELIN) 0.1 % nasal spray, 2 sprays into the nostril(s) as directed by provider 2 (Two) Times a Day. Use in each nostril as directed, Disp: 30 mL, Rfl: 12  •  betamethasone, augmented, (DIPROLENE) 0.05 % ointment, Apply  topically to the appropriate area as directed Daily., Disp: 50 g, Rfl: 2  •  budesonide-formoterol (SYMBICORT) 80-4.5 MCG/ACT inhaler, Inhale 2 puffs 2 (Two) Times a Day., Disp: , Rfl:   •  ergocalciferol (ERGOCALCIFEROL) 1.25 MG (18179 UT) capsule, Take 1 capsule by mouth 1 (One) Time Per Week., Disp: 12 capsule, Rfl: 3  •  fluticasone (FLONASE) 50 MCG/ACT nasal spray, SPRAY TWO SPRAYS IN EACH NOSTRIL AS DIRECTED BY THE PROVIDER ONCE DAILY (Patient taking differently: 2 sprays into the nostril(s) as directed by provider Daily As Needed.), Disp: 1 bottle, Rfl: 5  •  fluticasone-salmeterol (Advair Diskus) 250-50 MCG/DOSE DISKUS, Inhale 1 puff 2 (Two) Times a Day., Disp: 60 each, Rfl: 5  •  hydroCHLOROthiazide (HYDRODIURIL) 25 MG tablet, Take 1 tablet by mouth Daily., Disp: 90 tablet, Rfl: 1  •  ibuprofen (ADVIL,MOTRIN) 800 MG tablet, Take 800 mg by mouth Every 6 (Six) Hours As Needed  for Mild Pain ., Disp: , Rfl:   •  rOPINIRole (REQUIP) 0.25 MG tablet, Take 1 tablet by mouth Every Night. Take 1 hour before bedtime., Disp: 90 tablet, Rfl: 1  •  cefdinir (OMNICEF) 300 MG capsule, Take 1 capsule by mouth 2 (Two) Times a Day., Disp: 20 capsule, Rfl: 0  •  hydrOXYzine pamoate (VISTARIL) 25 MG capsule, Take 1 capsule by mouth 3 (Three) Times a Day As Needed for Itching., Disp: 90 capsule, Rfl: 3  •  nicotine (NICODERM CQ) 21 MG/24HR patch, Place 1 patch on the skin as directed by provider Daily., Disp: 30 patch, Rfl: 2  •  QUEtiapine (SEROquel) 50 MG tablet, Take 1 tablet by mouth Every Night., Disp: 90 tablet, Rfl: 1  •  tiotropium (SPIRIVA) 18 MCG per inhalation capsule, Place 1 capsule into inhaler and inhale Daily., Disp: , Rfl:     Allergies   Allergen Reactions   • Codeine Nausea And Vomiting             Review of Systems   Constitutional: Negative for fever.   HENT: Positive for congestion. Negative for sinus pressure and sinus pain.    Neurological: Negative for facial asymmetry.   Hematological: Negative for adenopathy.           Objective   Physical Exam   Constitutional: She appears well-developed and well-nourished.   HENT:   Head: Normocephalic.   Right Ear: External ear and ear canal normal.   Left Ear: External ear and ear canal normal.   Nose:       Mouth/Throat: Oropharynx is clear and moist.   Eyes: Conjunctivae are normal.   Cardiovascular: Normal rate.   Pulmonary/Chest: Effort normal.   Lymphadenopathy:     She has no cervical adenopathy.   Neurological: She is alert.   Skin: Skin is warm.   Psychiatric: She has a normal mood and affect. Thought content normal.   Vitals reviewed.          Assessment/Plan   Patricia was seen today for follow-up.    Diagnoses and all orders for this visit:    Nasal turbinate hypertrophy    Deviated septum    Sasha bullosa    Other headache syndrome    continue Flonase and Astelin  Hold off on nasal septal , turbinate, and sasha bullosa surgery  Call  if problems   Followup in 6 months or prn if problems

## 2020-07-28 ENCOUNTER — OFFICE VISIT (OUTPATIENT)
Dept: FAMILY MEDICINE CLINIC | Facility: CLINIC | Age: 55
End: 2020-07-28

## 2020-07-28 VITALS
OXYGEN SATURATION: 98 % | HEART RATE: 65 BPM | WEIGHT: 127.3 LBS | HEIGHT: 64 IN | SYSTOLIC BLOOD PRESSURE: 116 MMHG | BODY MASS INDEX: 21.73 KG/M2 | DIASTOLIC BLOOD PRESSURE: 78 MMHG

## 2020-07-28 DIAGNOSIS — I10 BENIGN HYPERTENSION: Primary | ICD-10-CM

## 2020-07-28 DIAGNOSIS — F33.1 MODERATE EPISODE OF RECURRENT MAJOR DEPRESSIVE DISORDER (HCC): ICD-10-CM

## 2020-07-28 DIAGNOSIS — G25.81 RESTLESS LEG SYNDROME: ICD-10-CM

## 2020-07-28 DIAGNOSIS — Z23 NEED FOR SHINGLES VACCINE: ICD-10-CM

## 2020-07-28 DIAGNOSIS — E78.01 FAMILIAL HYPERCHOLESTEROLEMIA: ICD-10-CM

## 2020-07-28 DIAGNOSIS — J44.1 CHRONIC OBSTRUCTIVE PULMONARY DISEASE WITH ACUTE EXACERBATION (HCC): ICD-10-CM

## 2020-07-28 PROCEDURE — 90750 HZV VACC RECOMBINANT IM: CPT | Performed by: NURSE PRACTITIONER

## 2020-07-28 PROCEDURE — 99214 OFFICE O/P EST MOD 30 MIN: CPT | Performed by: NURSE PRACTITIONER

## 2020-07-28 PROCEDURE — 90471 IMMUNIZATION ADMIN: CPT | Performed by: NURSE PRACTITIONER

## 2020-07-28 RX ORDER — ROPINIROLE 1 MG/1
1 TABLET, FILM COATED ORAL NIGHTLY
Qty: 30 TABLET | Refills: 3 | Status: SHIPPED | OUTPATIENT
Start: 2020-07-28 | End: 2020-12-02

## 2020-07-28 RX ORDER — BETAMETHASONE DIPROPIONATE 0.5 MG/G
OINTMENT TOPICAL DAILY
Qty: 50 G | Refills: 2 | Status: SHIPPED | OUTPATIENT
Start: 2020-07-28 | End: 2021-04-16 | Stop reason: SDUPTHER

## 2020-07-28 NOTE — PROGRESS NOTES
"Subjective   Patricia Collins is a 55 y.o. female.  3-month follow-up for high blood pressure, high cholesterol, COPD and depression.  Has been seeing Dr. Sal, ENT, for chronic sinusitis.  \"He tell me on my CAT scan that I actually had 3 sinuses instead of 2.  He put me on some different nasal finally after couple weeks my headache is gone and the congestion and pressure are gone.  I feel so much better.\"  Currently on Requip for restless leg.  \"I was wondering if it could be increased.  I do really good until 2 or 3:00 in the morning sometimes.  Once I wake up my legs start moving and then I have a hard time going back to sleep.\"    Hypertension   This is a chronic problem. The current episode started more than 1 year ago. The problem is unchanged. The problem is controlled. Associated symptoms include anxiety. Pertinent negatives include no shortness of breath or sweats. There are no associated agents to hypertension. Risk factors for coronary artery disease include stress, post-menopausal state and smoking/tobacco exposure. Current antihypertension treatment includes diuretics. The current treatment provides moderate improvement. There are no compliance problems.  There is no history of kidney disease, CAD/MI, CVA, heart failure, left ventricular hypertrophy or retinopathy.   Depression   Visit Type: follow-up  Patient presents with the following symptoms: muscle tension and restlessness.  Patient is not experiencing: anhedonia, chest pain, compulsions, confusion, depressed mood, dizziness, excessive worry, feelings of hopelessness, feelings of worthlessness, hypersomnia, hyperventilation, malaise, memory impairment, nausea, obsessions, panic, psychomotor agitation, psychomotor retardation, shortness of breath, suicidal planning and thoughts of death.  Frequency of symptoms: occasionally   Severity: moderate   Sleep quality: non-restorative  Nighttime awakenings: several  Compliance with medications:  " %        Hyperlipidemia   This is a chronic problem. The current episode started more than 1 year ago. The problem is controlled. Factors aggravating her hyperlipidemia include smoking and thiazides. Associated symptoms include leg pain. Pertinent negatives include no shortness of breath. Current antihyperlipidemic treatment includes statins. The current treatment provides significant improvement of lipids.   COPD   There is no cough or shortness of breath. This is a chronic problem. The current episode started more than 1 year ago. The problem occurs constantly. The problem has been waxing and waning. Pertinent negatives include no fever or sweats. Exacerbated by: smokin. She reports moderate improvement on treatment.   Leg Pain    There was no injury mechanism. The pain is present in the left leg and right leg. The quality of the pain is described as aching. The pain is at a severity of 7/10. The pain is moderate. The pain has been fluctuating since onset. Exacerbated by: lying down  Treatments tried: Requip. The treatment provided mild relief.        The following portions of the patient's history were reviewed and updated as appropriate:   Current Outpatient Medications   Medication Sig Dispense Refill   • albuterol sulfate  (90 Base) MCG/ACT inhaler Inhale 2 puffs Every 6 (Six) Hours As Needed for Wheezing. 1 inhaler 11   • atorvastatin (LIPITOR) 20 MG tablet Take 1 tablet by mouth Daily. 90 tablet 2   • azelastine (ASTELIN) 0.1 % nasal spray 2 sprays into the nostril(s) as directed by provider 2 (Two) Times a Day. Use in each nostril as directed 30 mL 12   • azelastine (ASTELIN) 0.1 % nasal spray 2 sprays into the nostril(s) as directed by provider 2 (Two) Times a Day. Use in each nostril as directed 30 mL 5   • betamethasone, augmented, (DIPROLENE) 0.05 % ointment Apply  topically to the appropriate area as directed Daily. 50 g 2   • budesonide-formoterol (SYMBICORT) 80-4.5 MCG/ACT inhaler Inhale  2 puffs 2 (Two) Times a Day.     • cefdinir (OMNICEF) 300 MG capsule Take 1 capsule by mouth 2 (Two) Times a Day. 20 capsule 0   • ergocalciferol (ERGOCALCIFEROL) 1.25 MG (11259 UT) capsule Take 1 capsule by mouth 1 (One) Time Per Week. 12 capsule 3   • fluticasone (FLONASE) 50 MCG/ACT nasal spray SPRAY TWO SPRAYS IN EACH NOSTRIL AS DIRECTED BY THE PROVIDER ONCE DAILY (Patient taking differently: 2 sprays into the nostril(s) as directed by provider Daily As Needed.) 1 bottle 5   • fluticasone-salmeterol (Advair Diskus) 250-50 MCG/DOSE DISKUS Inhale 1 puff 2 (Two) Times a Day. 60 each 5   • hydroCHLOROthiazide (HYDRODIURIL) 25 MG tablet Take 1 tablet by mouth Daily. 90 tablet 1   • hydrOXYzine pamoate (VISTARIL) 25 MG capsule Take 1 capsule by mouth 3 (Three) Times a Day As Needed for Itching. 90 capsule 3   • ibuprofen (ADVIL,MOTRIN) 800 MG tablet Take 800 mg by mouth Every 6 (Six) Hours As Needed for Mild Pain .     • QUEtiapine (SEROquel) 50 MG tablet Take 1 tablet by mouth Every Night. 90 tablet 1   • tiotropium (SPIRIVA) 18 MCG per inhalation capsule Place 1 capsule into inhaler and inhale Daily.     • rOPINIRole (Requip) 1 MG tablet Take 1 tablet by mouth Every Night. Take 1 hour before bedtime. 30 tablet 3     No current facility-administered medications for this visit.      Current Outpatient Medications on File Prior to Visit   Medication Sig   • albuterol sulfate  (90 Base) MCG/ACT inhaler Inhale 2 puffs Every 6 (Six) Hours As Needed for Wheezing.   • atorvastatin (LIPITOR) 20 MG tablet Take 1 tablet by mouth Daily.   • azelastine (ASTELIN) 0.1 % nasal spray 2 sprays into the nostril(s) as directed by provider 2 (Two) Times a Day. Use in each nostril as directed   • azelastine (ASTELIN) 0.1 % nasal spray 2 sprays into the nostril(s) as directed by provider 2 (Two) Times a Day. Use in each nostril as directed   • betamethasone, augmented, (DIPROLENE) 0.05 % ointment Apply  topically to the appropriate  area as directed Daily.   • budesonide-formoterol (SYMBICORT) 80-4.5 MCG/ACT inhaler Inhale 2 puffs 2 (Two) Times a Day.   • cefdinir (OMNICEF) 300 MG capsule Take 1 capsule by mouth 2 (Two) Times a Day.   • ergocalciferol (ERGOCALCIFEROL) 1.25 MG (54808 UT) capsule Take 1 capsule by mouth 1 (One) Time Per Week.   • fluticasone (FLONASE) 50 MCG/ACT nasal spray SPRAY TWO SPRAYS IN EACH NOSTRIL AS DIRECTED BY THE PROVIDER ONCE DAILY (Patient taking differently: 2 sprays into the nostril(s) as directed by provider Daily As Needed.)   • fluticasone-salmeterol (Advair Diskus) 250-50 MCG/DOSE DISKUS Inhale 1 puff 2 (Two) Times a Day.   • hydroCHLOROthiazide (HYDRODIURIL) 25 MG tablet Take 1 tablet by mouth Daily.   • hydrOXYzine pamoate (VISTARIL) 25 MG capsule Take 1 capsule by mouth 3 (Three) Times a Day As Needed for Itching.   • ibuprofen (ADVIL,MOTRIN) 800 MG tablet Take 800 mg by mouth Every 6 (Six) Hours As Needed for Mild Pain .   • QUEtiapine (SEROquel) 50 MG tablet Take 1 tablet by mouth Every Night.   • tiotropium (SPIRIVA) 18 MCG per inhalation capsule Place 1 capsule into inhaler and inhale Daily.   • [DISCONTINUED] nicotine (NICODERM CQ) 21 MG/24HR patch Place 1 patch on the skin as directed by provider Daily.   • [DISCONTINUED] rOPINIRole (REQUIP) 0.25 MG tablet Take 1 tablet by mouth Every Night. Take 1 hour before bedtime.     No current facility-administered medications on file prior to visit.      She is allergic to codeine..    Review of Systems   Constitutional: Negative.  Negative for chills, diaphoresis, fatigue and fever.   HENT: Negative.    Respiratory: Negative.  Negative for cough and shortness of breath.    Cardiovascular: Negative.    Gastrointestinal: Negative.    Genitourinary: Negative.    Musculoskeletal: Negative.    Skin: Negative.    Neurological: Negative.    Psychiatric/Behavioral: Negative.  Negative for confusion.       Objective    Visit Vitals  /78   Pulse 65   Ht 162.6 cm  "(64\")   Wt 57.7 kg (127 lb 4.8 oz)   LMP  (LMP Unknown)   SpO2 98%   BMI 21.85 kg/m²       Physical Exam   Constitutional: She is oriented to person, place, and time. She appears well-developed and well-nourished.   HENT:   Head: Normocephalic.   Cardiovascular: Normal rate, regular rhythm and normal heart sounds.   Pulmonary/Chest: Effort normal and breath sounds normal.   Abdominal: Soft. Bowel sounds are normal.   Musculoskeletal: Normal range of motion.   Neurological: She is alert and oriented to person, place, and time.   Skin: Skin is warm. Capillary refill takes less than 2 seconds.   Psychiatric: She has a normal mood and affect. Her behavior is normal.   Nursing note and vitals reviewed.      Assessment/Plan   Problems Addressed this Visit        Cardiovascular and Mediastinum    Familial hypercholesterolemia    Benign hypertension - Primary       Respiratory    Chronic obstructive lung disease (CMS/HCC)       Other    Moderate episode of recurrent major depressive disorder (CMS/HCC)      Other Visit Diagnoses     Restless leg syndrome        Relevant Medications    rOPINIRole (Requip) 1 MG tablet    Need for shingles vaccine        Relevant Orders    Shingrix Vaccine (Completed)        New Medications Ordered This Visit   Medications   • rOPINIRole (Requip) 1 MG tablet     Sig: Take 1 tablet by mouth Every Night. Take 1 hour before bedtime.     Dispense:  30 tablet     Refill:  3     1.  Benign hypertension:  Continue on hydrochlorothiazide as previously prescribed  Continue on reduce sodium diet of no more than 2000 mg/day  Will attempt to decrease consumption of potato chips due to high salt content    2.  Familial hypercholesterolemia:  Continue on diet low in saturated fats  Encourage lean meat such as chicken fish as opposed to fatty red meat  Continue on Lipitor as previously prescribed    3.  Chronic obstructive lung disease:  Continue on Symbicort as previously prescribed  Continue on Advair " Diskus as previously prescribed  Continue on Spiriva as previously prescribed  Educated on possible side effects of these medications including but not limited to increased risk for oral candidiasis, bronchospasm  Encourage smoking cessation at this time but declines    4.  Moderate episode of recurrent major depressive disorder:  Continue on Seroquel and Vistaril as previously prescribed    5.  Restless leg syndrome:  Increase Requip from 0.25 mg p.o. nightly to 1 mg p.o. Nightly  Educated on possible side effects of this medication including not limited to increased risk of somnolence, hypotension, dyspepsia and vision changes  Encouraged warm water soaks prior to bedtime to help promote muscle relaxation    6.  Need for shingles vaccine:  Shingrix vaccine given IM in office  Educated on possible side effects of this medication including not limited to increased risk for pain, swelling and redness of injection site  Educated on importance of completing second dose of this vaccine therapy in the next 2 to 6 months    Continue on current medications as previously prescribed   I spent 24 minutes in direct face to face contact with patient.  Greater than 50% of this time was spent counseling patient and discussing plan of care.  Return in about 3 months (around 10/28/2020) for Annual physical.        This document has been electronically signed by JAI Mccurdy on July 28, 2020 11:01

## 2020-09-04 ENCOUNTER — TELEPHONE (OUTPATIENT)
Dept: FAMILY MEDICINE CLINIC | Facility: CLINIC | Age: 55
End: 2020-09-04

## 2020-09-04 RX ORDER — IBUPROFEN 800 MG/1
800 TABLET ORAL EVERY 6 HOURS PRN
Qty: 60 TABLET | Refills: 0 | Status: SHIPPED | OUTPATIENT
Start: 2020-09-04 | End: 2021-05-15 | Stop reason: SDUPTHER

## 2020-09-04 NOTE — TELEPHONE ENCOUNTER
Requested Refills Sent    ----- Message from Patricia Collins sent at 9/4/2020 11:06 AM CDT -----  Regarding: Prescription Question  Contact: [phone]  I'm needing to refill my ibuprofen 800 mg but it says can't be refilled?

## 2020-11-28 DIAGNOSIS — E55.9 VITAMIN D DEFICIENCY: ICD-10-CM

## 2020-11-30 RX ORDER — ERGOCALCIFEROL 1.25 MG/1
50000 CAPSULE ORAL WEEKLY
Qty: 12 CAPSULE | Refills: 3 | Status: SHIPPED | OUTPATIENT
Start: 2020-11-30 | End: 2021-11-30

## 2020-12-02 DIAGNOSIS — G25.81 RESTLESS LEG SYNDROME: ICD-10-CM

## 2020-12-02 RX ORDER — ROPINIROLE 1 MG/1
TABLET, FILM COATED ORAL
Qty: 30 TABLET | Refills: 2 | Status: SHIPPED | OUTPATIENT
Start: 2020-12-02 | End: 2021-03-01

## 2020-12-14 DIAGNOSIS — E78.2 MIXED HYPERLIPIDEMIA: ICD-10-CM

## 2020-12-14 RX ORDER — ATORVASTATIN CALCIUM 20 MG/1
20 TABLET, FILM COATED ORAL DAILY
Qty: 90 TABLET | Refills: 2 | Status: SHIPPED | OUTPATIENT
Start: 2020-12-14 | End: 2021-09-13

## 2020-12-29 DIAGNOSIS — I10 BENIGN HYPERTENSION: ICD-10-CM

## 2020-12-30 RX ORDER — HYDROCHLOROTHIAZIDE 25 MG/1
25 TABLET ORAL DAILY
Qty: 90 TABLET | Refills: 1 | Status: SHIPPED | OUTPATIENT
Start: 2020-12-30 | End: 2022-07-08 | Stop reason: SDUPTHER

## 2021-01-04 ENCOUNTER — OFFICE VISIT (OUTPATIENT)
Dept: FAMILY MEDICINE CLINIC | Facility: CLINIC | Age: 56
End: 2021-01-04

## 2021-01-04 VITALS
DIASTOLIC BLOOD PRESSURE: 88 MMHG | SYSTOLIC BLOOD PRESSURE: 124 MMHG | HEIGHT: 64 IN | WEIGHT: 132.2 LBS | BODY MASS INDEX: 22.57 KG/M2

## 2021-01-04 DIAGNOSIS — Z12.31 ENCOUNTER FOR SCREENING MAMMOGRAM FOR MALIGNANT NEOPLASM OF BREAST: ICD-10-CM

## 2021-01-04 DIAGNOSIS — Z13.29 SCREENING FOR HYPOTHYROIDISM: ICD-10-CM

## 2021-01-04 DIAGNOSIS — G25.81 RESTLESS LEG: ICD-10-CM

## 2021-01-04 DIAGNOSIS — F33.1 MODERATE EPISODE OF RECURRENT MAJOR DEPRESSIVE DISORDER (HCC): ICD-10-CM

## 2021-01-04 DIAGNOSIS — E78.01 FAMILIAL HYPERCHOLESTEROLEMIA: ICD-10-CM

## 2021-01-04 DIAGNOSIS — Z23 NEED FOR SHINGLES VACCINE: Primary | ICD-10-CM

## 2021-01-04 DIAGNOSIS — Z12.4 SCREENING FOR CERVICAL CANCER: ICD-10-CM

## 2021-01-04 DIAGNOSIS — I10 BENIGN HYPERTENSION: ICD-10-CM

## 2021-01-04 DIAGNOSIS — Z00.00 ANNUAL PHYSICAL EXAM: ICD-10-CM

## 2021-01-04 DIAGNOSIS — J44.9 CHRONIC OBSTRUCTIVE PULMONARY DISEASE, UNSPECIFIED COPD TYPE (HCC): ICD-10-CM

## 2021-01-04 PROCEDURE — 90750 HZV VACC RECOMBINANT IM: CPT | Performed by: NURSE PRACTITIONER

## 2021-01-04 PROCEDURE — 90471 IMMUNIZATION ADMIN: CPT | Performed by: NURSE PRACTITIONER

## 2021-01-04 PROCEDURE — 99396 PREV VISIT EST AGE 40-64: CPT | Performed by: NURSE PRACTITIONER

## 2021-01-04 NOTE — PROGRESS NOTES
"Subjective   Patricia Collins is a 55 y.o. female.  Annual physical exam.  Has high blood pressure, high cholesterol, COPD, restless leg, anxiety and depression.  \"I stopped taking my Seroquel and I have been doing really fine without it.\"    HPI:  Annual physical exam     Anxiety  Symptoms include nervous/anxious behavior. Patient reports no confusion.     Her past medical history is significant for depression.   Hypertension  This is a chronic problem. The current episode started more than 1 year ago. The problem is unchanged. The problem is controlled. Associated symptoms include anxiety. Pertinent negatives include no sweats. There are no associated agents to hypertension. Risk factors for coronary artery disease include stress, post-menopausal state and smoking/tobacco exposure. Current antihypertension treatment includes diuretics. The current treatment provides moderate improvement. There are no compliance problems.  There is no history of kidney disease, CAD/MI, CVA, heart failure, left ventricular hypertrophy or retinopathy.   Depression  Visit Type: follow-up  Patient presents with the following symptoms: nervousness/anxiety.  Patient is not experiencing: anhedonia, chest pain, confusion, dizziness, feelings of hopelessness, feelings of worthlessness, hypersomnia, memory impairment, nausea, psychomotor agitation, psychomotor retardation, suicidal planning and thoughts of death.  Frequency of symptoms: occasionally   Severity: moderate   Sleep quality: non-restorative  Nighttime awakenings: several  Compliance with medications:  %        Hyperlipidemia  This is a chronic problem. The current episode started more than 1 year ago. The problem is controlled. Factors aggravating her hyperlipidemia include smoking and thiazides. Current antihyperlipidemic treatment includes statins. The current treatment provides significant improvement of lipids.   COPD  This is a chronic problem. The current episode " started more than 1 year ago. The problem occurs constantly. The problem has been waxing and waning. Pertinent negatives include no fever or sweats. Exacerbated by: smokin. She reports moderate improvement on treatment.        The following portions of the patient's history were reviewed and updated as appropriate:   She  has a past medical history of Acute frontal sinusitis, Benign hypertension, Breast mass, Chronic obstructive lung disease (CMS/HCC), Cough, Depression, Hyperlipidemia, and Smokes.  She does not have any pertinent problems on file.  She  has a past surgical history that includes  section; Breast biopsy (Bilateral); Cystoscopy (N/A, 2018); Colonoscopy (N/A, 2019); Dental surgery; and Tubal ligation.  Her family history includes Arrhythmia in her maternal grandmother; Heart failure in her sister; Hyperlipidemia in her mother; Hypertension in her mother; Osteoarthritis in her mother; Rectal cancer in her father; Stroke in her mother.  She  reports that she has been smoking cigarettes. She has been smoking about 1.00 pack per day. She has never used smokeless tobacco. She reports current alcohol use. She reports that she does not use drugs.  Current Outpatient Medications   Medication Sig Dispense Refill   • albuterol sulfate  (90 Base) MCG/ACT inhaler Inhale 2 puffs Every 6 (Six) Hours As Needed for Wheezing. 1 inhaler 11   • atorvastatin (Lipitor) 20 MG tablet Take 1 tablet by mouth Daily. 90 tablet 2   • azelastine (ASTELIN) 0.1 % nasal spray 2 sprays into the nostril(s) as directed by provider 2 (Two) Times a Day. Use in each nostril as directed 30 mL 12   • betamethasone, augmented, (DIPROLENE) 0.05 % ointment Apply  topically to the appropriate area as directed Daily. 50 g 2   • ergocalciferol (ERGOCALCIFEROL) 1.25 MG (58158 UT) capsule Take 1 capsule by mouth 1 (One) Time Per Week. 12 capsule 3   • fluticasone (FLONASE) 50 MCG/ACT nasal spray SPRAY TWO SPRAYS IN EACH  NOSTRIL AS DIRECTED BY THE PROVIDER ONCE DAILY (Patient taking differently: 2 sprays into the nostril(s) as directed by provider Daily As Needed.) 1 bottle 5   • Fluticasone Furoate-Vilanterol (Breo Ellipta) 200-25 MCG/INH inhaler Inhale 1 puff Daily.     • hydroCHLOROthiazide (HYDRODIURIL) 25 MG tablet Take 1 tablet by mouth Daily. 90 tablet 1   • ibuprofen (ADVIL,MOTRIN) 800 MG tablet Take 1 tablet by mouth Every 6 (Six) Hours As Needed for Mild Pain . 60 tablet 0   • rOPINIRole (REQUIP) 1 MG tablet TAKE ONE TABLET BY MOUTH ONCE NIGHTLY ONE HOUR BEFORE BEDTIME 30 tablet 2   • tiotropium (SPIRIVA) 18 MCG per inhalation capsule Place 1 capsule into inhaler and inhale Daily.     • budesonide-formoterol (SYMBICORT) 80-4.5 MCG/ACT inhaler Inhale 2 puffs 2 (Two) Times a Day.     • QUEtiapine (SEROquel) 50 MG tablet Take 1 tablet by mouth Every Night. 90 tablet 1     No current facility-administered medications for this visit.      Current Outpatient Medications on File Prior to Visit   Medication Sig   • albuterol sulfate  (90 Base) MCG/ACT inhaler Inhale 2 puffs Every 6 (Six) Hours As Needed for Wheezing.   • atorvastatin (Lipitor) 20 MG tablet Take 1 tablet by mouth Daily.   • azelastine (ASTELIN) 0.1 % nasal spray 2 sprays into the nostril(s) as directed by provider 2 (Two) Times a Day. Use in each nostril as directed   • betamethasone, augmented, (DIPROLENE) 0.05 % ointment Apply  topically to the appropriate area as directed Daily.   • ergocalciferol (ERGOCALCIFEROL) 1.25 MG (30145 UT) capsule Take 1 capsule by mouth 1 (One) Time Per Week.   • fluticasone (FLONASE) 50 MCG/ACT nasal spray SPRAY TWO SPRAYS IN EACH NOSTRIL AS DIRECTED BY THE PROVIDER ONCE DAILY (Patient taking differently: 2 sprays into the nostril(s) as directed by provider Daily As Needed.)   • Fluticasone Furoate-Vilanterol (Breo Ellipta) 200-25 MCG/INH inhaler Inhale 1 puff Daily.   • hydroCHLOROthiazide (HYDRODIURIL) 25 MG tablet Take 1  "tablet by mouth Daily.   • ibuprofen (ADVIL,MOTRIN) 800 MG tablet Take 1 tablet by mouth Every 6 (Six) Hours As Needed for Mild Pain .   • rOPINIRole (REQUIP) 1 MG tablet TAKE ONE TABLET BY MOUTH ONCE NIGHTLY ONE HOUR BEFORE BEDTIME   • tiotropium (SPIRIVA) 18 MCG per inhalation capsule Place 1 capsule into inhaler and inhale Daily.   • budesonide-formoterol (SYMBICORT) 80-4.5 MCG/ACT inhaler Inhale 2 puffs 2 (Two) Times a Day.   • QUEtiapine (SEROquel) 50 MG tablet Take 1 tablet by mouth Every Night.   • [DISCONTINUED] azelastine (ASTELIN) 0.1 % nasal spray 2 sprays into the nostril(s) as directed by provider 2 (Two) Times a Day. Use in each nostril as directed   • [DISCONTINUED] cefdinir (OMNICEF) 300 MG capsule Take 1 capsule by mouth 2 (Two) Times a Day.   • [DISCONTINUED] fluticasone-salmeterol (Advair Diskus) 250-50 MCG/DOSE DISKUS Inhale 1 puff 2 (Two) Times a Day.   • [DISCONTINUED] hydrOXYzine pamoate (VISTARIL) 25 MG capsule Take 1 capsule by mouth 3 (Three) Times a Day As Needed for Itching.     No current facility-administered medications on file prior to visit.      She is allergic to codeine..    Review of Systems   Constitutional: Negative.  Negative for chills, diaphoresis, fatigue and fever.   HENT: Negative.    Eyes: Negative.    Respiratory: Negative.    Cardiovascular: Negative.    Gastrointestinal: Negative.    Genitourinary: Negative.    Musculoskeletal: Negative.    Skin: Negative.    Neurological: Negative.    Psychiatric/Behavioral: Negative for confusion. The patient is nervous/anxious.        Objective    Visit Vitals  /88   Ht 162.6 cm (64\")   Wt 60 kg (132 lb 3.2 oz)   LMP  (LMP Unknown)   BMI 22.69 kg/m²       Physical Exam  Vitals signs and nursing note reviewed.   Constitutional:       Appearance: Normal appearance. She is well-developed.   HENT:      Head: Normocephalic and atraumatic.      Right Ear: Tympanic membrane, ear canal and external ear normal.      Left Ear: Tympanic " membrane, ear canal and external ear normal.      Nose: Nose normal.      Mouth/Throat:      Mouth: Mucous membranes are moist.      Pharynx: Oropharynx is clear.   Eyes:      Extraocular Movements: Extraocular movements intact.      Conjunctiva/sclera: Conjunctivae normal.      Pupils: Pupils are equal, round, and reactive to light.   Neck:      Musculoskeletal: Normal range of motion and neck supple.   Cardiovascular:      Rate and Rhythm: Normal rate and regular rhythm.      Heart sounds: Normal heart sounds.   Pulmonary:      Effort: Pulmonary effort is normal.      Breath sounds: Normal breath sounds.   Chest:      Comments: Bilateral, manual breast exam performed and no dimpling, puckering, masses or nodules palpated  Abdominal:      General: Bowel sounds are normal.      Palpations: Abdomen is soft.   Genitourinary:     Labia:         Right: No rash, tenderness or lesion.         Left: No rash, tenderness or lesion.       Urethra: No prolapse.      Vagina: Normal.      Cervix: Normal.      Uterus: Normal.       Adnexa: Right adnexa normal and left adnexa normal.   Musculoskeletal: Normal range of motion.   Skin:     General: Skin is warm.      Capillary Refill: Capillary refill takes less than 2 seconds.   Neurological:      Mental Status: She is alert and oriented to person, place, and time.   Psychiatric:         Behavior: Behavior normal.         Assessment/Plan   Problems Addressed this Visit        Cardiac and Vasculature    Familial hypercholesterolemia    Relevant Orders    Lipid Panel    Benign hypertension    Relevant Orders    CBC & Differential    Comprehensive Metabolic Panel       Mental Health    Moderate episode of recurrent major depressive disorder (CMS/HCC)       Pulmonary and Pneumonias    Chronic obstructive lung disease (CMS/HCC)    Relevant Medications    Fluticasone Furoate-Vilanterol (Breo Ellipta) 200-25 MCG/INH inhaler      Other Visit Diagnoses     Need for shingles vaccine    -   Primary    Relevant Orders    Shingrix Vaccine (Completed)    Annual physical exam        Restless leg        Relevant Orders    Magnesium    Screening for hypothyroidism        Relevant Orders    TSH    Screening for cervical cancer        Relevant Orders    Liquid-based Pap Smear, Screening    Encounter for screening mammogram for malignant neoplasm of breast        Relevant Orders    Mammo Screening Digital Tomosynthesis Bilateral With CAD      Diagnoses       Codes Comments    Need for shingles vaccine    -  Primary ICD-10-CM: Z23  ICD-9-CM: V04.89     Annual physical exam     ICD-10-CM: Z00.00  ICD-9-CM: V70.0     Benign hypertension     ICD-10-CM: I10  ICD-9-CM: 401.1     Familial hypercholesterolemia     ICD-10-CM: E78.01  ICD-9-CM: 272.0     Chronic obstructive pulmonary disease, unspecified COPD type (CMS/HCC)     ICD-10-CM: J44.9  ICD-9-CM: 496     Moderate episode of recurrent major depressive disorder (CMS/HCC)     ICD-10-CM: F33.1  ICD-9-CM: 296.32     Restless leg     ICD-10-CM: G25.81  ICD-9-CM: 333.94     Screening for hypothyroidism     ICD-10-CM: Z13.29  ICD-9-CM: V77.0     Screening for cervical cancer     ICD-10-CM: Z12.4  ICD-9-CM: V76.2     Encounter for screening mammogram for malignant neoplasm of breast     ICD-10-CM: Z12.31  ICD-9-CM: V76.12       No orders of the defined types were placed in this encounter.    1.  Annual physical exam:  Continue on current medications as previously prescribed   Anticipatory guidance and counseling discussed with patient including importance of healthy eating habits and adhering to regular exercise regimen  I spent 26 minutes in direct face to face contact with patient.  Greater than 50% of this time was spent counseling patient and discussing plan of care.  Return in about 6 months (around 7/4/2021) for Recheck.    2.  Benign hypertension:  Complete CBC and chemistry panel as ordered and will notify of results when available  Continue on  hydrochlorothiazide as previously prescribed  Continue on reduce sodium diet of no more than 2000 mg/day  Has stopped adding table salt to her foods     3.  Familial hypercholesterolemia:  Complete fasting lipid panel as ordered and will notify of results when available  Continue on Lipitor as previously prescribed  Encouraged adhere to a low-fat diet  Encouraged cooking with olive oil as opposed to vegetable oil     4.  Chronic obstructive lung disease:  Continue on Symbicort as previously prescribed  Continue on Advair Diskus as previously prescribed  Continue on Spiriva as previously prescribed  Educated on possible side effects of these medications including but not limited to increased risk for oral candidiasis, bronchospasm  Patricia Collins  reports that she has been smoking cigarettes. She has been smoking about 1.00 pack per day. She has never used smokeless tobacco.I have educated her on the risk of diseases from using tobacco products such as cancer, COPD and heart disease.   I advised her to quit and she is not willing to quit.  I spent 3.5 minutes counseling the patient.     5.  Moderate episode of recurrent major depressive disorder:  Continue on Seroquel and Vistaril as previously prescribed     6.  Restless leg syndrome:  Complete magnesium level as ordered and will notify of results when available  Continue on Requip as previously prescribed   Educated on possible side effects of this medication including not limited to increased risk of somnolence, hypotension, dyspepsia and vision changes  Encouraged warm water soaks prior to bedtime to help promote muscle relaxation     7.  Screening for hypothyroidism:  Complete TSH as ordered and will notify results when available    8.  Screening for cervical cancer:  ThinPrep Pap smear collected and sent to lab will notify of results when available    9.  Encounter for screening mammogram for malignant neoplasm of the breast:  Radiology will call to  schedule bilateral mammogram  Encouraged monthly self breast exams at home        This document has been electronically signed by JAI Mccurdy on January 4, 2021 09:29 CST

## 2021-01-07 LAB
LAB AP CASE REPORT: NORMAL
PATH INTERP SPEC-IMP: NORMAL

## 2021-01-08 ENCOUNTER — LAB (OUTPATIENT)
Dept: LAB | Facility: HOSPITAL | Age: 56
End: 2021-01-08

## 2021-01-08 ENCOUNTER — TELEPHONE (OUTPATIENT)
Dept: FAMILY MEDICINE CLINIC | Facility: CLINIC | Age: 56
End: 2021-01-08

## 2021-01-08 DIAGNOSIS — E78.01 FAMILIAL HYPERCHOLESTEROLEMIA: ICD-10-CM

## 2021-01-08 DIAGNOSIS — I10 BENIGN HYPERTENSION: ICD-10-CM

## 2021-01-08 DIAGNOSIS — Z13.29 SCREENING FOR HYPOTHYROIDISM: ICD-10-CM

## 2021-01-08 DIAGNOSIS — G25.81 RESTLESS LEG: ICD-10-CM

## 2021-01-08 LAB
ALBUMIN SERPL-MCNC: 4 G/DL (ref 3.5–5.2)
ALBUMIN/GLOB SERPL: 1.5 G/DL
ALP SERPL-CCNC: 83 U/L (ref 39–117)
ALT SERPL W P-5'-P-CCNC: 18 U/L (ref 1–33)
ANION GAP SERPL CALCULATED.3IONS-SCNC: 10.1 MMOL/L (ref 5–15)
AST SERPL-CCNC: 22 U/L (ref 1–32)
BASOPHILS # BLD AUTO: 0.04 10*3/MM3 (ref 0–0.2)
BASOPHILS NFR BLD AUTO: 0.9 % (ref 0–1.5)
BILIRUB SERPL-MCNC: 0.4 MG/DL (ref 0–1.2)
BUN SERPL-MCNC: 14 MG/DL (ref 6–20)
BUN/CREAT SERPL: 16.3 (ref 7–25)
CALCIUM SPEC-SCNC: 9.3 MG/DL (ref 8.6–10.5)
CHLORIDE SERPL-SCNC: 104 MMOL/L (ref 98–107)
CHOLEST SERPL-MCNC: 188 MG/DL (ref 0–200)
CO2 SERPL-SCNC: 25.9 MMOL/L (ref 22–29)
CREAT SERPL-MCNC: 0.86 MG/DL (ref 0.57–1)
DEPRECATED RDW RBC AUTO: 40 FL (ref 37–54)
EOSINOPHIL # BLD AUTO: 0.29 10*3/MM3 (ref 0–0.4)
EOSINOPHIL NFR BLD AUTO: 6.6 % (ref 0.3–6.2)
ERYTHROCYTE [DISTWIDTH] IN BLOOD BY AUTOMATED COUNT: 11.7 % (ref 12.3–15.4)
GFR SERPL CREATININE-BSD FRML MDRD: 68 ML/MIN/1.73
GLOBULIN UR ELPH-MCNC: 2.6 GM/DL
GLUCOSE SERPL-MCNC: 96 MG/DL (ref 65–99)
HCT VFR BLD AUTO: 40.2 % (ref 34–46.6)
HDLC SERPL-MCNC: 49 MG/DL (ref 40–60)
HGB BLD-MCNC: 14 G/DL (ref 12–15.9)
IMM GRANULOCYTES # BLD AUTO: 0.01 10*3/MM3 (ref 0–0.05)
IMM GRANULOCYTES NFR BLD AUTO: 0.2 % (ref 0–0.5)
LDLC SERPL CALC-MCNC: 124 MG/DL (ref 0–100)
LDLC/HDLC SERPL: 2.51 {RATIO}
LYMPHOCYTES # BLD AUTO: 1.27 10*3/MM3 (ref 0.7–3.1)
LYMPHOCYTES NFR BLD AUTO: 28.8 % (ref 19.6–45.3)
MAGNESIUM SERPL-MCNC: 2.3 MG/DL (ref 1.6–2.6)
MCH RBC QN AUTO: 32.7 PG (ref 26.6–33)
MCHC RBC AUTO-ENTMCNC: 34.8 G/DL (ref 31.5–35.7)
MCV RBC AUTO: 93.9 FL (ref 79–97)
MONOCYTES # BLD AUTO: 0.38 10*3/MM3 (ref 0.1–0.9)
MONOCYTES NFR BLD AUTO: 8.6 % (ref 5–12)
NEUTROPHILS NFR BLD AUTO: 2.42 10*3/MM3 (ref 1.7–7)
NEUTROPHILS NFR BLD AUTO: 54.9 % (ref 42.7–76)
NRBC BLD AUTO-RTO: 0 /100 WBC (ref 0–0.2)
PLATELET # BLD AUTO: 178 10*3/MM3 (ref 140–450)
PMV BLD AUTO: 10.4 FL (ref 6–12)
POTASSIUM SERPL-SCNC: 4.1 MMOL/L (ref 3.5–5.2)
PROT SERPL-MCNC: 6.6 G/DL (ref 6–8.5)
RBC # BLD AUTO: 4.28 10*6/MM3 (ref 3.77–5.28)
SODIUM SERPL-SCNC: 140 MMOL/L (ref 136–145)
TRIGL SERPL-MCNC: 79 MG/DL (ref 0–150)
TSH SERPL DL<=0.05 MIU/L-ACNC: 4.1 UIU/ML (ref 0.27–4.2)
VLDLC SERPL-MCNC: 15 MG/DL (ref 5–40)
WBC # BLD AUTO: 4.41 10*3/MM3 (ref 3.4–10.8)

## 2021-01-08 PROCEDURE — 83735 ASSAY OF MAGNESIUM: CPT

## 2021-01-08 PROCEDURE — 80061 LIPID PANEL: CPT

## 2021-01-08 PROCEDURE — 84443 ASSAY THYROID STIM HORMONE: CPT

## 2021-01-08 PROCEDURE — 85025 COMPLETE CBC W/AUTO DIFF WBC: CPT

## 2021-01-08 PROCEDURE — 80053 COMPREHEN METABOLIC PANEL: CPT

## 2021-01-08 NOTE — PROGRESS NOTES
Per JAI Erickson, Ms. Collins has been called with recent Pap Smear results & recommendations.  Continue current medications and follow-up as planned or sooner if any problems.    Georgina,   She said first available Gyn is fine, she has no preference

## 2021-01-08 NOTE — TELEPHONE ENCOUNTER
Per JAI Erickson, Ms. Collins has been called with recent Pap Smear results & recommendations.  Continue current medications and follow-up as planned or sooner if any problems.    Georgina,   She said first available Gyn is fine, she has no preference      ----- Message from JAI Mccurdy sent at 1/8/2021  7:21 AM CST -----  Pap smear showed atypical glandular cells.  Recommendation is for biopsy.  Need to refer her to gynecology.  Can you please ask who she would like to see?

## 2021-01-11 DIAGNOSIS — R87.619 ATYPICAL CERVICAL GLANDULAR CELLS: Primary | ICD-10-CM

## 2021-01-21 ENCOUNTER — OFFICE VISIT (OUTPATIENT)
Dept: OTOLARYNGOLOGY | Facility: CLINIC | Age: 56
End: 2021-01-21

## 2021-01-21 VITALS — WEIGHT: 132.6 LBS | TEMPERATURE: 97.6 F | HEIGHT: 64 IN | BODY MASS INDEX: 22.64 KG/M2

## 2021-01-21 DIAGNOSIS — R05.9 COUGH: ICD-10-CM

## 2021-01-21 DIAGNOSIS — G44.89 OTHER HEADACHE SYNDROME: ICD-10-CM

## 2021-01-21 DIAGNOSIS — J34.3 NASAL TURBINATE HYPERTROPHY: ICD-10-CM

## 2021-01-21 DIAGNOSIS — J34.2 DEVIATED SEPTUM: Primary | ICD-10-CM

## 2021-01-21 PROCEDURE — 99213 OFFICE O/P EST LOW 20 MIN: CPT | Performed by: OTOLARYNGOLOGY

## 2021-01-21 RX ORDER — FLUTICASONE PROPIONATE 50 MCG
2 SPRAY, SUSPENSION (ML) NASAL DAILY
Qty: 16 G | Refills: 6 | Status: SHIPPED | OUTPATIENT
Start: 2021-01-21

## 2021-01-21 RX ORDER — AZELASTINE 1 MG/ML
2 SPRAY, METERED NASAL 2 TIMES DAILY
Qty: 30 ML | Refills: 12 | Status: SHIPPED | OUTPATIENT
Start: 2021-01-21 | End: 2022-01-06

## 2021-01-21 NOTE — PROGRESS NOTES
Subjective   Patricia Collins is a 55 y.o. female.   Follow-up nasal symptoms    History of Present Illness   Patient's been on Astelin nasal spray and not been using Flonase regularly said she had some more soreness recently she has been only using the Flonase as needed she said the combination originally dramatically.  Her headaches and she is not having trouble breathing through her nose 7 she touched her nose 1 time few days ago it was sore she is denies fever denies any pus drainage no unusual drainage or throat she still smoking though advised not to      The following portions of the patient's history were reviewed and updated as appropriate: allergies, current medications, past family history, past medical history, past social history, past surgical history and problem list.      Current Outpatient Medications:   •  atorvastatin (Lipitor) 20 MG tablet, Take 1 tablet by mouth Daily., Disp: 90 tablet, Rfl: 2  •  azelastine (ASTELIN) 0.1 % nasal spray, 2 sprays into the nostril(s) as directed by provider 2 (Two) Times a Day. Use in each nostril as directed, Disp: 30 mL, Rfl: 12  •  ergocalciferol (ERGOCALCIFEROL) 1.25 MG (25863 UT) capsule, Take 1 capsule by mouth 1 (One) Time Per Week., Disp: 12 capsule, Rfl: 3  •  Fluticasone Furoate-Vilanterol (Breo Ellipta) 200-25 MCG/INH inhaler, Inhale 1 puff Daily., Disp: , Rfl:   •  hydroCHLOROthiazide (HYDRODIURIL) 25 MG tablet, Take 1 tablet by mouth Daily., Disp: 90 tablet, Rfl: 1  •  rOPINIRole (REQUIP) 1 MG tablet, TAKE ONE TABLET BY MOUTH ONCE NIGHTLY ONE HOUR BEFORE BEDTIME, Disp: 30 tablet, Rfl: 2  •  albuterol sulfate  (90 Base) MCG/ACT inhaler, Inhale 2 puffs Every 6 (Six) Hours As Needed for Wheezing., Disp: 1 inhaler, Rfl: 11  •  betamethasone, augmented, (DIPROLENE) 0.05 % ointment, Apply  topically to the appropriate area as directed Daily., Disp: 50 g, Rfl: 2  •  budesonide-formoterol (SYMBICORT) 80-4.5 MCG/ACT inhaler, Inhale 2 puffs 2 (Two)  Times a Day., Disp: , Rfl:   •  fluticasone (FLONASE) 50 MCG/ACT nasal spray, 2 sprays into the nostril(s) as directed by provider Daily., Disp: 16 g, Rfl: 6  •  ibuprofen (ADVIL,MOTRIN) 800 MG tablet, Take 1 tablet by mouth Every 6 (Six) Hours As Needed for Mild Pain ., Disp: 60 tablet, Rfl: 0    Allergies   Allergen Reactions   • Codeine Nausea And Vomiting             Review of Systems   Constitutional: Negative for fatigue and fever.   HENT: Negative for congestion, facial swelling, nosebleeds, postnasal drip, rhinorrhea and sore throat.    Allergic/Immunologic: Positive for environmental allergies.   Neurological: Positive for headaches.   Hematological: Negative for adenopathy.           Objective   Physical Exam  Vitals signs and nursing note reviewed.   HENT:      Head: Normocephalic.      Right Ear: Tympanic membrane, ear canal and external ear normal.      Left Ear: Tympanic membrane, ear canal and external ear normal.      Mouth/Throat:      Pharynx: Oropharynx is clear.   Eyes:      Conjunctiva/sclera: Conjunctivae normal.   Pulmonary:      Effort: Pulmonary effort is normal.   Skin:     General: Skin is warm.   Neurological:      General: No focal deficit present.      Mental Status: She is alert.   Psychiatric:         Mood and Affect: Mood normal.             Assessment/Plan   Diagnoses and all orders for this visit:    1. Deviated septum (Primary)    2. Nasal turbinate hypertrophy    3. Other headache syndrome    4. Cough  -     fluticasone (FLONASE) 50 MCG/ACT nasal spray; 2 sprays into the nostril(s) as directed by provider Daily.  Dispense: 16 g; Refill: 6    Other orders  -     azelastine (ASTELIN) 0.1 % nasal spray; 2 sprays into the nostril(s) as directed by provider 2 (Two) Times a Day. Use in each nostril as directed  Dispense: 30 mL; Refill: 12    To add back the Flonase and have her use it consistently in the mornings in addition to using the Astelin which should use that twice a day she is  to call if not improving the next 2 weeks  Consider CT sinus other studies if her headaches persist  She has no evidence of sinusitis now and is happy with this approach she understands will see Dr. Arrington to follow-up for future care  Mikey technique of use and side effects of nasal sprays which she will continue

## 2021-01-21 NOTE — PATIENT INSTRUCTIONS
MyPlate from USDA    MyPlate is an outline of a general healthy diet based on the 2010 Dietary Guidelines for Americans, from the U.[place] Department of Agriculture (USDA). It sets guidelines for how much food you should eat from each food group based on your age, sex, and level of physical activity.  What are tips for following MyPlate?  To follow MyPlate recommendations:  · Eat a wide variety of fruits and vegetables, grains, and protein foods.  · Serve smaller portions and eat less food throughout the day.  · Limit portion sizes to avoid overeating.  · Enjoy your food.  · Get at least 150 minutes of exercise every week. This is about 30 minutes each day, 5 or more days per week.  It can be difficult to have every meal look like MyPlate. Think about MyPlate as eating guidelines for an entire day, rather than each individual meal.  Fruits and vegetables  · Make half of your plate fruits and vegetables.  · Eat many different colors of fruits and vegetables each day.  · For a 2,000 calorie daily food plan, eat:  ? 2½ cups of vegetables every day.  ? 2 cups of fruit every day.  · 1 cup is equal to:  ? 1 cup raw or cooked vegetables.  ? 1 cup raw fruit.  ? 1 medium-sized orange, apple, or banana.  ? 1 cup 100% fruit or vegetable juice.  ? 2 cups raw leafy greens, such as lettuce, spinach, or kale.  ? ½ cup dried fruit.  Grains  · One fourth of your plate should be grains.  · Make at least half of the grains you eat each day whole grains.  · For a 2,000 calorie daily food plan, eat 6 oz of grains every day.  · 1 oz is equal to:  ? 1 slice bread.  ? 1 cup cereal.  ? ½ cup cooked rice, cereal, or pasta.  Protein  · One fourth of your plate should be protein.  · Eat a wide variety of protein foods, including meat, poultry, fish, eggs, beans, nuts, and tofu.  · For a 2,000 calorie daily food plan, eat 5½ oz of protein every day.  · 1 oz is equal to:  ? 1 oz meat, poultry, or fish.  ? ¼ cup cooked beans.  ? 1 egg.  ? ½ oz nuts  or seeds.  ? 1 Tbsp peanut butter.  Dairy  · Drink fat-free or low-fat (1%) milk.  · Eat or drink dairy as a side to meals.  · For a 2,000 calorie daily food plan, eat or drink 3 cups of dairy every day.  · 1 cup is equal to:  ? 1 cup milk, yogurt, cottage cheese, or soy milk (soy beverage).  ? 2 oz processed cheese.  ? 1½ oz natural cheese.  Fats, oils, salt, and sugars  · Only small amounts of oils are recommended.  · Avoid foods that are high in calories and low in nutritional value (empty calories), like foods high in fat or added sugars.  · Choose foods that are low in salt (sodium). Choose foods that have less than 140 milligrams (mg) of sodium per serving.  · Drink water instead of sugary drinks. Drink enough water each day to keep your urine pale yellow.  Where to find support  · Work with your health care provider or a nutrition specialist (dietitian) to develop a customized eating plan that is right for you.  · Download an santo (mobile application) to help you track your daily food intake.  Where to find more information  · Go to ChooseMyPlate.gov for more information.  Summary  · MyPlate is a general guideline for healthy eating from the USDA. It is based on the 2010 Dietary Guidelines for Americans.  · In general, fruits and vegetables should take up ½ of your plate, grains should take up ¼ of your plate, and protein should take up ¼ of your plate.  This information is not intended to replace advice given to you by your health care provider. Make sure you discuss any questions you have with your health care provider.  Document Revised: 05/21/2020 Document Reviewed: 03/19/2018  Elsevier Patient Education © 2020 [employer]

## 2021-01-26 ENCOUNTER — PROCEDURE VISIT (OUTPATIENT)
Dept: OBSTETRICS AND GYNECOLOGY | Facility: CLINIC | Age: 56
End: 2021-01-26

## 2021-01-26 VITALS
HEIGHT: 64 IN | DIASTOLIC BLOOD PRESSURE: 88 MMHG | WEIGHT: 131.6 LBS | BODY MASS INDEX: 22.47 KG/M2 | SYSTOLIC BLOOD PRESSURE: 142 MMHG

## 2021-01-26 DIAGNOSIS — R87.619 ATYPICAL GLANDULAR CELLS, FAVOR NEOPLASIA ON CERVICAL PAP SMEAR: Primary | ICD-10-CM

## 2021-01-26 PROCEDURE — 57454 BX/CURETT OF CERVIX W/SCOPE: CPT | Performed by: OBSTETRICS & GYNECOLOGY

## 2021-01-26 PROCEDURE — 99203 OFFICE O/P NEW LOW 30 MIN: CPT | Performed by: OBSTETRICS & GYNECOLOGY

## 2021-01-26 NOTE — PROGRESS NOTES
Patricia Collins is a 55 y.o. y/o female.     Chief Complaint: Biopsy for abnormal Pap smear    HPI:   55 y.o. .  No LMP recorded (lmp unknown). Patient is postmenopausal..  Patient presents for evaluation secondary to abnormal Pap smear.  Recent Pap smear results showed atypical glandular cells favor neoplasm.  Explained to patient that I would recommend colposcopy along with endometrial biopsy to further evaluate for any abnormal cells.  Patient felt comfortable with this plan.     Review of Systems   Constitutional: Negative for chills, fatigue and fever.   HENT: Negative for sore throat.    Eyes: Negative for visual disturbance.   Respiratory: Negative for cough, shortness of breath and wheezing.    Cardiovascular: Negative for chest pain, palpitations and leg swelling.   Gastrointestinal: Negative for abdominal pain, diarrhea, nausea and vomiting.   Endocrine: Negative for cold intolerance and heat intolerance.   Genitourinary: Negative for dysuria, flank pain, frequency, menstrual problem, pelvic pain, vaginal bleeding, vaginal discharge and vaginal pain.   Skin: Negative for color change and pallor.   Neurological: Negative for syncope, light-headedness and headaches.   Psychiatric/Behavioral: Negative for dysphoric mood and suicidal ideas. The patient is not nervous/anxious.         The following portions of the patient's history were reviewed and updated as appropriate: allergies, current medications, past family history, past medical history, past social history, past surgical history and problem list.    Allergies   Allergen Reactions   • Codeine Nausea And Vomiting        Prior to Admission medications    Medication Sig Start Date End Date Taking? Authorizing Provider   albuterol sulfate  (90 Base) MCG/ACT inhaler Inhale 2 puffs Every 6 (Six) Hours As Needed for Wheezing. 10/28/19  Yes Georgina Mcrae APRN   atorvastatin (Lipitor) 20 MG tablet Take 1 tablet by mouth Daily. 20  Yes  Georgina Mcrae APRN   azelastine (ASTELIN) 0.1 % nasal spray 2 sprays into the nostril(s) as directed by provider 2 (Two) Times a Day. Use in each nostril as directed 21  Yes Avery Sal MD   betamethasone, augmented, (DIPROLENE) 0.05 % ointment Apply  topically to the appropriate area as directed Daily. 20  Yes Georgina Mcrae APRN   budesonide-formoterol (SYMBICORT) 80-4.5 MCG/ACT inhaler Inhale 2 puffs 2 (Two) Times a Day.   Yes ProviderSameer MD   ergocalciferol (ERGOCALCIFEROL) 1.25 MG (72598 UT) capsule Take 1 capsule by mouth 1 (One) Time Per Week. 20 Yes Georgina Mcrae APRN   fluticasone (FLONASE) 50 MCG/ACT nasal spray 2 sprays into the nostril(s) as directed by provider Daily. 21  Yes Avery Sal MD   Fluticasone Furoate-Vilanterol (Breo Ellipta) 200-25 MCG/INH inhaler Inhale 1 puff Daily.   Yes Provider, MD Sameer   hydroCHLOROthiazide (HYDRODIURIL) 25 MG tablet Take 1 tablet by mouth Daily. 20  Yes Georgina Mcrae APRN   ibuprofen (ADVIL,MOTRIN) 800 MG tablet Take 1 tablet by mouth Every 6 (Six) Hours As Needed for Mild Pain . 20  Yes Georgina Mcrae APRN   rOPINIRole (REQUIP) 1 MG tablet TAKE ONE TABLET BY MOUTH ONCE NIGHTLY ONE HOUR BEFORE BEDTIME 20  Yes Georgina Mcrae APRN        The patient has a family history of   Family History   Problem Relation Age of Onset   • Hypertension Mother    • Stroke Mother    • Hyperlipidemia Mother    • Osteoarthritis Mother    • Rectal cancer Father    • Colon cancer Father    • Heart failure Sister    • Arrhythmia Maternal Grandmother         Past Medical History:   Diagnosis Date   • Acute frontal sinusitis    • Benign hypertension    • Breast cyst    • Breast mass     left breast, right breast with questionable node   • Chronic obstructive lung disease (CMS/HCC)    • Cough     likely secondary to chronic smoking   • Depression    • Hyperlipidemia    • Smokes         OB History        2     "Para   2    Term   2            AB        Living           SAB        TAB        Ectopic        Molar        Multiple        Live Births                     Social History     Socioeconomic History   • Marital status:      Spouse name: Not on file   • Number of children: Not on file   • Years of education: Not on file   • Highest education level: Not on file   Tobacco Use   • Smoking status: Current Every Day Smoker     Packs/day: 1.00     Types: Cigarettes   • Smokeless tobacco: Never Used   • Tobacco comment: 1-1.5 packs per day, has patches, has tried oral meds   Substance and Sexual Activity   • Alcohol use: Yes     Frequency: Monthly or less     Drinks per session: 1 or 2     Binge frequency: Never     Comment: socially   • Drug use: No   • Sexual activity: Defer        Past Surgical History:   Procedure Laterality Date   • BREAST BIOPSY Bilateral    •  SECTION     • COLONOSCOPY N/A 2019    Procedure: COLONOSCOPY;  Surgeon: Arun Bagley MD;  Location: [facility] ENDOSCOPY;  Service: General   • CYSTOSCOPY N/A 2018    Procedure: CYSTOSCOPY; URETHRAL DILATION;  Surgeon: Mychal Cabrera MD;  Location: [facility] OR;  Service:    • DENTAL PROCEDURE      full mouth extraction   • TUBAL ABDOMINAL LIGATION      post partum        Patient Active Problem List   Diagnosis   • Familial hypercholesterolemia   • Chronic obstructive lung disease (CMS/HCC)   • Benign hypertension   • Moderate episode of recurrent major depressive disorder (CMS/HCC)   • Prediabetes   • Urethral stenosis   • Cervical pain (neck)   • Degenerative disc disease, cervical   • Spondylosis of cervical region without myelopathy or radiculopathy   • Chronic midline thoracic back pain   • Screen for colon cancer        Documented Vitals    21 1417   BP: 142/88   Weight: 59.7 kg (131 lb 9.6 oz)   Height: 162.6 cm (64\")   PainSc: 0-No pain        Body mass index is 22.59 kg/m².    Physical Exam  Vitals signs and " nursing note reviewed.   Constitutional:       General: She is not in acute distress.     Appearance: Normal appearance. She is well-developed. She is not ill-appearing, toxic-appearing or diaphoretic.   HENT:      Head: Normocephalic.      Mouth/Throat:      Mouth: Mucous membranes are moist.   Neck:      Musculoskeletal: Normal range of motion.      Thyroid: No thyromegaly.   Genitourinary:     Vagina: Normal.   Musculoskeletal: Normal range of motion.         General: No swelling, tenderness or deformity.   Skin:     General: Skin is warm and dry.      Findings: No erythema.   Neurological:      General: No focal deficit present.      Mental Status: She is alert and oriented to person, place, and time.   Psychiatric:         Mood and Affect: Mood normal.         Behavior: Behavior normal.         Thought Content: Thought content normal.         Judgment: Judgment normal.       Colposcopy procedure described to patient as well as potential biopsy and ecc and how they are performed with pt.    SS placed in vagina and cervix visualized. Acetic acid solution applied. Adequate colposcopy.  No acetowhite changes were noted, ECC performed.  Cervix was then cleaned with Betadine and an endometrial biopsy was performed the uterus sounded to approximately 7 cm with minimal tissue returned on biopsy.  Tissue sent with nursing assistant for pathology.    Laboratory Data:   Lab Results - Last 18 Months   Lab Units 01/08/21  0747 10/31/19  0746   GLUCOSE mg/dL 96 98   BUN mg/dL 14 9   CREATININE mg/dL 0.86 1.06*   SODIUM mmol/L 140 138   POTASSIUM mmol/L 4.1 4.7   CHLORIDE mmol/L 104 101   CO2 mmol/L 25.9 26.9   CALCIUM mg/dL 9.3 9.8   TOTAL PROTEIN g/dL 6.6 7.1   ALBUMIN g/dL 4.00 4.20   ALT (SGPT) U/L 18 20   AST (SGOT) U/L 22 19   ALK PHOS U/L 83 61   BILIRUBIN mg/dL 0.4 0.3   EGFR IF NONAFRICN AM mL/min/1.73 68 54*   GLOBULIN gm/dL 2.6 2.9   A/G RATIO g/dL 1.5 1.4   BUN / CREAT RATIO  16.3 8.5   ANION GAP mmol/L 10.1 10.1      Lab Results - Last 18 Months   Lab Units 01/08/21  0747 10/31/19  0746   WBC 10*3/mm3 4.41 7.03   RBC 10*6/mm3 4.28 4.70   HEMOGLOBIN g/dL 14.0 15.6   HEMATOCRIT % 40.2 45.2   MCV fL 93.9 96.2   MCH pg 32.7 33.2*   MCHC g/dL 34.8 34.5   RDW % 11.7* 12.1*   RDW-SD fl 40.0 43.0   MPV fL 10.4 10.6   PLATELETS 10*3/mm3 178 181     No results for input(s): HCGQUAL in the last 96069 hours.      Assessment/Plan   Diagnoses and all orders for this visit:    1. Atypical glandular cells, favor neoplasia on cervical Pap smear (Primary)      Patient with atypical glandular cells of undetermined significance.  Uncomplicated colposcopy with no changes noted ECC was performed.  Endometrial biopsy was uncomplicated with minimal tissue obtained.  -bleeding precautions  -will call pt with results, okay to leave message.  Follow-up based on biopsy results.  -Tissue samples sent for pathology.          This document has been electronically signed by oTlu Escobar DO on January 26, 2021 14:56 CST

## 2021-01-29 LAB
LAB AP CASE REPORT: NORMAL
LAB AP CLINICAL INFORMATION: NORMAL
PATH REPORT.FINAL DX SPEC: NORMAL

## 2021-03-01 DIAGNOSIS — G25.81 RESTLESS LEG SYNDROME: ICD-10-CM

## 2021-03-01 RX ORDER — ROPINIROLE 1 MG/1
TABLET, FILM COATED ORAL
Qty: 30 TABLET | Refills: 1 | Status: SHIPPED | OUTPATIENT
Start: 2021-03-01 | End: 2021-05-03 | Stop reason: SDUPTHER

## 2021-04-19 RX ORDER — BETAMETHASONE DIPROPIONATE 0.5 MG/G
OINTMENT TOPICAL DAILY
Qty: 50 G | Refills: 2 | Status: SHIPPED | OUTPATIENT
Start: 2021-04-19 | End: 2021-07-06

## 2021-05-03 DIAGNOSIS — G25.81 RESTLESS LEG SYNDROME: ICD-10-CM

## 2021-05-03 RX ORDER — ROPINIROLE 1 MG/1
1 TABLET, FILM COATED ORAL NIGHTLY
Qty: 90 TABLET | Refills: 1 | Status: SHIPPED | OUTPATIENT
Start: 2021-05-03 | End: 2021-10-28

## 2021-05-04 DIAGNOSIS — L40.9 PSORIASIS: Primary | ICD-10-CM

## 2021-05-04 RX ORDER — TRIAMCINOLONE ACETONIDE 5 MG/G
OINTMENT TOPICAL 2 TIMES DAILY
Qty: 15 G | Refills: 2 | Status: SHIPPED | OUTPATIENT
Start: 2021-05-04 | End: 2022-06-02 | Stop reason: SINTOL

## 2021-05-17 RX ORDER — IBUPROFEN 800 MG/1
800 TABLET ORAL EVERY 6 HOURS PRN
Qty: 60 TABLET | Refills: 0 | Status: SHIPPED | OUTPATIENT
Start: 2021-05-17 | End: 2021-10-19 | Stop reason: SDUPTHER

## 2021-07-06 ENCOUNTER — OFFICE VISIT (OUTPATIENT)
Dept: FAMILY MEDICINE CLINIC | Facility: CLINIC | Age: 56
End: 2021-07-06

## 2021-07-06 VITALS
SYSTOLIC BLOOD PRESSURE: 142 MMHG | OXYGEN SATURATION: 97 % | HEIGHT: 64 IN | HEART RATE: 82 BPM | DIASTOLIC BLOOD PRESSURE: 86 MMHG | WEIGHT: 133 LBS | BODY MASS INDEX: 22.71 KG/M2

## 2021-07-06 DIAGNOSIS — F41.9 ANXIETY: ICD-10-CM

## 2021-07-06 DIAGNOSIS — H53.9 VISUAL DISTURBANCE: ICD-10-CM

## 2021-07-06 DIAGNOSIS — I10 BENIGN HYPERTENSION: Primary | ICD-10-CM

## 2021-07-06 DIAGNOSIS — F33.1 MODERATE EPISODE OF RECURRENT MAJOR DEPRESSIVE DISORDER (HCC): ICD-10-CM

## 2021-07-06 DIAGNOSIS — J44.9 CHRONIC OBSTRUCTIVE PULMONARY DISEASE, UNSPECIFIED COPD TYPE (HCC): ICD-10-CM

## 2021-07-06 DIAGNOSIS — E78.01 FAMILIAL HYPERCHOLESTEROLEMIA: ICD-10-CM

## 2021-07-06 PROCEDURE — 99214 OFFICE O/P EST MOD 30 MIN: CPT | Performed by: NURSE PRACTITIONER

## 2021-07-06 NOTE — PROGRESS NOTES
"Chief Complaint  Hypertension (6 month check up), Hyperlipidemia, COPD, Anxiety, Depression, and Eye Problem    Subjective  Over the past several years has been told that she has \"optical migraines.  I will get these little changes in my vision but they usually only last for few seconds at a time and I do not have a headaches.  This past Friday I began having an episode but this time it has not gotten any better.\"         Patricia Collins presents to [facility]  Anxiety  Presents for follow-up visit. Symptoms include decreased concentration, depressed mood, excessive worry, nervous/anxious behavior and restlessness. Patient reports no confusion. Symptoms occur most days. The severity of symptoms is moderate. The quality of sleep is fair. Nighttime awakenings: occasional.     Her past medical history is significant for depression. Compliance with medications is %.   Hypertension  This is a chronic problem. The current episode started more than 1 year ago. The problem is unchanged. The problem is controlled. Associated symptoms include anxiety and blurred vision. Pertinent negatives include no sweats. There are no associated agents to hypertension. Risk factors for coronary artery disease include stress, post-menopausal state and smoking/tobacco exposure. Current antihypertension treatment includes diuretics. The current treatment provides moderate improvement. There are no compliance problems.  There is no history of kidney disease, CAD/MI, CVA, heart failure, left ventricular hypertrophy or retinopathy.   Depression  Visit Type: follow-up  Patient presents with the following symptoms: decreased concentration, depressed mood, excessive worry, nervousness/anxiety and restlessness.  Patient is not experiencing: anhedonia, chest pain, confusion, dizziness, feelings of hopelessness, feelings of worthlessness, hypersomnia, memory impairment, nausea, psychomotor agitation, " "psychomotor retardation, suicidal planning and thoughts of death.  Frequency of symptoms: occasionally   Severity: moderate   Sleep quality: fair  Nighttime awakenings: occasional  Compliance with medications:  %        Hyperlipidemia  This is a chronic problem. The current episode started more than 1 year ago. The problem is controlled. Factors aggravating her hyperlipidemia include smoking and thiazides. Current antihyperlipidemic treatment includes statins. The current treatment provides significant improvement of lipids.   COPD  This is a chronic problem. The current episode started more than 1 year ago. The problem occurs constantly. The problem has been waxing and waning. Pertinent negatives include no fever or sweats. Exacerbated by: smokin. She reports moderate improvement on treatment.   Eye Problem   The left eye is affected. This is a chronic problem. The current episode started more than 1 year ago. The problem occurs intermittently. The problem has been gradually worsening. There was no injury mechanism. The pain is at a severity of 0/10. The patient is experiencing no pain. Associated symptoms include blurred vision and photophobia. Pertinent negatives include no fever. She has tried nothing for the symptoms.       Objective   Vital Signs:   /86   Pulse 82   Ht 162.6 cm (64\")   Wt 60.3 kg (133 lb)   SpO2 97%   BMI 22.83 kg/m²     Physical Exam  Vitals and nursing note reviewed.   Constitutional:       Appearance: She is well-developed.   HENT:      Head: Normocephalic.      Right Ear: External ear normal.      Left Ear: External ear normal.      Mouth/Throat:      Mouth: Mucous membranes are moist.   Eyes:      Pupils: Pupils are equal, round, and reactive to light.   Cardiovascular:      Rate and Rhythm: Normal rate and regular rhythm.      Heart sounds: Normal heart sounds.   Pulmonary:      Effort: Pulmonary effort is normal.      Breath sounds: Normal breath sounds.   Abdominal:    "   General: Bowel sounds are normal.      Palpations: Abdomen is soft.   Musculoskeletal:         General: Normal range of motion.      Cervical back: Normal range of motion and neck supple.   Skin:     General: Skin is warm.      Capillary Refill: Capillary refill takes less than 2 seconds.   Neurological:      Mental Status: She is alert and oriented to person, place, and time.   Psychiatric:         Behavior: Behavior normal.        Result Review :     Common labs    Common Labsle 1/8/21 1/8/21 1/8/21    0747 0747 0747   Glucose  96    BUN  14    Creatinine  0.86    eGFR Non African Am  68    Sodium  140    Potassium  4.1    Chloride  104    Calcium  9.3    Albumin  4.00    Total Bilirubin  0.4    Alkaline Phosphatase  83    AST (SGOT)  22    ALT (SGPT)  18    WBC 4.41     Hemoglobin 14.0     Hematocrit 40.2     Platelets 178     Total Cholesterol   188   Triglycerides   79   HDL Cholesterol   49   LDL Cholesterol    124 (A)   (A) Abnormal value                      Assessment and Plan    Diagnoses and all orders for this visit:    1. Benign hypertension (Primary)    2. Familial hypercholesterolemia    3. Chronic obstructive pulmonary disease, unspecified COPD type (CMS/HCC)    4. Moderate episode of recurrent major depressive disorder (CMS/HCC)    5. Anxiety    6. Visual disturbance  -     Ambulatory Referral to Ophthalmology      1.  Benign hypertension:  Continue on hydrochlorothiazide as previously prescribed  Encouraged adhere to a low-sodium diet  Set goal systolic BP of less than 140  Reports blood pressure has been running better but has had a stressful last few days    2.  Familial hypercholesterolemia:  Continue Lipitor as previously prescribed  Continue to adhere to a low-fat diet    3.  COPD:  Patricia Evans Dennis  reports that she has been smoking cigarettes. She has been smoking about 1.00 pack per day. She has never used smokeless tobacco.. I have educated her on the risk of diseases from using  tobacco products such as cancer, COPD and heart disease.   I advised her to quit and she is not willing to quit.  I spent 3.5 minutes counseling the patient.    4.  Moderate episode of recurrent major depressive disorder:  Depression symptoms resolved at this time  Encouraged to seek emergency medical treatment for any new or worsening thoughts of hopelessness, helplessness or self-harm    5.  Anxiety:  Continue with stress reducing techniques as previously discussed    6.  Referral placed ophthalmology will call to schedule appointment  Encouraged to seek emergency medical treatment for any new or worsening vision changes, eye pain    I spent 31 minutes caring for Patricia on this date of service. This time includes time spent by me in the following activities:preparing for the visit, reviewing tests, obtaining and/or reviewing a separately obtained history, performing a medically appropriate examination and/or evaluation , counseling and educating the patient/family/caregiver, ordering medications, tests, or procedures, referring and communicating with other health care professionals , documenting information in the medical record and care coordination  Follow Up   Return in about 6 months (around 1/6/2022) for Annual physical.  Patient was given instructions and counseling regarding her condition or for health maintenance advice. Please see specific information pulled into the AVS if appropriate.         This document has been electronically signed by JAI Mccurdy on July 6, 2021 08:55 CDT '

## 2021-07-30 ENCOUNTER — TELEPHONE (OUTPATIENT)
Dept: FAMILY MEDICINE CLINIC | Facility: CLINIC | Age: 56
End: 2021-07-30

## 2021-07-30 RX ORDER — BUDESONIDE AND FORMOTEROL FUMARATE DIHYDRATE 80; 4.5 UG/1; UG/1
2 AEROSOL RESPIRATORY (INHALATION)
Qty: 10.2 G | Refills: 3 | Status: SHIPPED | OUTPATIENT
Start: 2021-07-30 | End: 2022-01-06 | Stop reason: SDUPTHER

## 2021-07-30 NOTE — TELEPHONE ENCOUNTER
Last OV 07/06/2021    Next Steve 01/06/2022    ---- Message from Patricia Collins sent at 7/30/2021 12:31 PM CDT -----  Regarding: Prescription Question  Contact: [phone]  I need refills for my symbicort inhaler   Thank you

## 2021-08-03 ENCOUNTER — OFFICE VISIT (OUTPATIENT)
Dept: OTOLARYNGOLOGY | Facility: CLINIC | Age: 56
End: 2021-08-03

## 2021-08-03 VITALS — BODY MASS INDEX: 22.43 KG/M2 | OXYGEN SATURATION: 97 % | HEIGHT: 64 IN | WEIGHT: 131.4 LBS

## 2021-08-03 DIAGNOSIS — G50.0 TRIGEMINAL NEURALGIA OF RIGHT SIDE OF FACE: ICD-10-CM

## 2021-08-03 DIAGNOSIS — J34.2 DEVIATED SEPTUM: ICD-10-CM

## 2021-08-03 DIAGNOSIS — G44.89 OTHER HEADACHE SYNDROME: Primary | ICD-10-CM

## 2021-08-03 PROCEDURE — 99213 OFFICE O/P EST LOW 20 MIN: CPT | Performed by: OTOLARYNGOLOGY

## 2021-08-03 NOTE — PROGRESS NOTES
Subjective   Patricia Collins is a 56 y.o. female.     Nasal facial symptoms  History of Present Illness     Patient complains of headaches around her eye last 2 to 3 minutes long sneezing coughing and touching her face seem to trigger it this is a change from previous she is not having eye drainage pain or discomfort she is had his previous CT which did not show sinusitis she is not having severe is postnasal drip her symptoms are not as severe as they were in terms of the length of time she has it multiple times a day every day she says  She was using Astelin  The following portions of the patient's history were reviewed and updated as appropriate: allergies, current medications, past family history, past medical history, past social history, past surgical history and problem list.      Current Outpatient Medications:   •  albuterol sulfate  (90 Base) MCG/ACT inhaler, Inhale 2 puffs Every 6 (Six) Hours As Needed for Wheezing., Disp: 1 inhaler, Rfl: 11  •  atorvastatin (Lipitor) 20 MG tablet, Take 1 tablet by mouth Daily., Disp: 90 tablet, Rfl: 2  •  azelastine (ASTELIN) 0.1 % nasal spray, 2 sprays into the nostril(s) as directed by provider 2 (Two) Times a Day. Use in each nostril as directed, Disp: 30 mL, Rfl: 12  •  budesonide-formoterol (SYMBICORT) 80-4.5 MCG/ACT inhaler, Inhale 2 puffs 2 (Two) Times a Day., Disp: 10.2 g, Rfl: 3  •  ergocalciferol (ERGOCALCIFEROL) 1.25 MG (36784 UT) capsule, Take 1 capsule by mouth 1 (One) Time Per Week., Disp: 12 capsule, Rfl: 3  •  fluticasone (FLONASE) 50 MCG/ACT nasal spray, 2 sprays into the nostril(s) as directed by provider Daily., Disp: 16 g, Rfl: 6  •  hydroCHLOROthiazide (HYDRODIURIL) 25 MG tablet, Take 1 tablet by mouth Daily., Disp: 90 tablet, Rfl: 1  •  ibuprofen (ADVIL,MOTRIN) 800 MG tablet, Take 1 tablet by mouth Every 6 (Six) Hours As Needed for Mild Pain ., Disp: 60 tablet, Rfl: 0  •  rOPINIRole (REQUIP) 1 MG tablet, Take 1 tablet by mouth Every  Night. Take 1 hour before bedtime., Disp: 90 tablet, Rfl: 1  •  triamcinolone (KENALOG) 0.5 % ointment, Apply  topically to the appropriate area as directed 2 (Two) Times a Day., Disp: 15 g, Rfl: 2    Allergies   Allergen Reactions   • Codeine Nausea And Vomiting             Review of Systems   Constitutional: Negative for fever.   HENT: Positive for sinus pain. Negative for ear pain, nosebleeds and sore throat.    Allergic/Immunologic: Positive for environmental allergies.   Neurological: Positive for headaches. Negative for facial asymmetry.           Objective   Physical Exam  Vitals and nursing note reviewed.   HENT:      Head: Normocephalic.      Right Ear: Tympanic membrane, ear canal and external ear normal.      Left Ear: Tympanic membrane, ear canal and external ear normal.      Nose:      Comments: Patient is not tender today and there is no swelling she has a deviated septum mild turbinate perjury but no purulence polyps or pus or blood     Mouth/Throat:      Mouth: Mucous membranes are moist.      Comments: No unusual drainage noted  Eyes:      Conjunctiva/sclera: Conjunctivae normal.   Pulmonary:      Effort: Pulmonary effort is normal.   Musculoskeletal:      Cervical back: No rigidity.   Lymphadenopathy:      Cervical: No cervical adenopathy.   Skin:     General: Skin is warm.   Neurological:      General: No focal deficit present.      Mental Status: She is alert.   Psychiatric:         Mood and Affect: Mood normal.     No palpable or facial mass or swelling in the area that talked her symptoms and she is not tender today    FINDINGS: Normal paranasal sinuses. No opacification,  mucoperiosteal thickening or air-fluid levels. There are no bony  abnormalities. Ostiomeatal complexes are normal, symmetrical and  patent. Small sasha bullosa left superior nasal turbinate.     IMPRESSION:  Small sasha bullosa superior left nasal turbinate.      Otherwise unremarkable exam. Sinuses are  unremarkable.     Electronically signed by:  Khoi Dexter MD  5/21/2020 10:49 AM CDT    Assessment/Plan   Diagnoses and all orders for this visit:    1. Other headache syndrome (Primary)  -     Ambulatory Referral to Neurology    2. Deviated septum    3. Trigeminal neuralgia of right side of face  -     Ambulatory Referral to Neurology    Patient is not bothered by breathing and deviated septum and drainage is not a major concern she thinks the Astelin is help cut back on her severity of her headaches but they are still quite frequent interestingly now she says it set off by touching her nose or face last 2 to 3 minutes is usually around the right side of the nose and eye suggesting possible trigeminal neuralgia or other neurologic cause  She agrees to referral for neurology evaluation since she is had a CT of the sinuses which was negative for sinusitis I do not think sasha bullosa deviated septum compound for symptoms and treatment with medical therapy is not been successful  Further imaging will be done by neurology including MRIs if needed  Patient agrees to treatment plan and wants to see a neurologist for evaluation  Told her we will be happy to see her back but I do not think working to be able to contribute to improve her symptoms much as a neurologist may.

## 2021-09-12 DIAGNOSIS — E78.2 MIXED HYPERLIPIDEMIA: ICD-10-CM

## 2021-09-13 RX ORDER — ATORVASTATIN CALCIUM 20 MG/1
TABLET, FILM COATED ORAL
Qty: 90 TABLET | Refills: 2 | Status: SHIPPED | OUTPATIENT
Start: 2021-09-13 | End: 2023-01-18 | Stop reason: SDUPTHER

## 2021-09-29 DIAGNOSIS — E53.8 VITAMIN B 12 DEFICIENCY: Primary | ICD-10-CM

## 2021-09-29 RX ORDER — LANOLIN ALCOHOL/MO/W.PET/CERES
1000 CREAM (GRAM) TOPICAL DAILY
Qty: 90 TABLET | Refills: 3 | Status: SHIPPED | OUTPATIENT
Start: 2021-09-29 | End: 2023-01-18 | Stop reason: SDUPTHER

## 2021-10-19 RX ORDER — IBUPROFEN 800 MG/1
800 TABLET ORAL EVERY 6 HOURS PRN
Qty: 60 TABLET | Refills: 0 | Status: SHIPPED | OUTPATIENT
Start: 2021-10-19 | End: 2022-06-02 | Stop reason: SDUPTHER

## 2021-10-28 DIAGNOSIS — G25.81 RESTLESS LEG SYNDROME: ICD-10-CM

## 2021-10-28 RX ORDER — ROPINIROLE 1 MG/1
TABLET, FILM COATED ORAL
Qty: 90 TABLET | Refills: 1 | Status: SHIPPED | OUTPATIENT
Start: 2021-10-28 | End: 2022-04-26 | Stop reason: SDUPTHER

## 2022-01-03 PROCEDURE — 87635 SARS-COV-2 COVID-19 AMP PRB: CPT | Performed by: NURSE PRACTITIONER

## 2022-01-06 ENCOUNTER — OFFICE VISIT (OUTPATIENT)
Dept: FAMILY MEDICINE CLINIC | Facility: CLINIC | Age: 57
End: 2022-01-06

## 2022-01-06 VITALS
HEART RATE: 83 BPM | DIASTOLIC BLOOD PRESSURE: 78 MMHG | OXYGEN SATURATION: 97 % | WEIGHT: 133.1 LBS | SYSTOLIC BLOOD PRESSURE: 134 MMHG | HEIGHT: 64 IN | BODY MASS INDEX: 22.72 KG/M2

## 2022-01-06 DIAGNOSIS — Z12.31 ENCOUNTER FOR SCREENING MAMMOGRAM FOR MALIGNANT NEOPLASM OF BREAST: ICD-10-CM

## 2022-01-06 DIAGNOSIS — Z00.00 ANNUAL PHYSICAL EXAM: Primary | ICD-10-CM

## 2022-01-06 DIAGNOSIS — I10 BENIGN HYPERTENSION: ICD-10-CM

## 2022-01-06 DIAGNOSIS — J44.9 CHRONIC OBSTRUCTIVE PULMONARY DISEASE, UNSPECIFIED COPD TYPE: ICD-10-CM

## 2022-01-06 DIAGNOSIS — Z13.29 SCREENING FOR HYPOTHYROIDISM: ICD-10-CM

## 2022-01-06 DIAGNOSIS — E78.01 FAMILIAL HYPERCHOLESTEROLEMIA: ICD-10-CM

## 2022-01-06 PROCEDURE — 99396 PREV VISIT EST AGE 40-64: CPT | Performed by: NURSE PRACTITIONER

## 2022-01-06 RX ORDER — ALBUTEROL SULFATE 90 UG/1
2 AEROSOL, METERED RESPIRATORY (INHALATION) EVERY 6 HOURS PRN
Qty: 18 G | Refills: 5 | Status: SHIPPED | OUTPATIENT
Start: 2022-01-06

## 2022-01-06 RX ORDER — BUDESONIDE AND FORMOTEROL FUMARATE DIHYDRATE 80; 4.5 UG/1; UG/1
2 AEROSOL RESPIRATORY (INHALATION)
Qty: 10.2 G | Refills: 3 | Status: SHIPPED | OUTPATIENT
Start: 2022-01-06 | End: 2023-01-26 | Stop reason: SDUPTHER

## 2022-01-06 NOTE — PROGRESS NOTES
Chief Complaint  Annual Exam    Subjective          Patricia Collins presents to [facility] [place]  HPI:  Annual physical exam     Hypertension  This is a chronic problem. The current episode started more than 1 year ago. The problem is unchanged. The problem is controlled. Pertinent negatives include no sweats. There are no associated agents to hypertension. Risk factors for coronary artery disease include stress, post-menopausal state and smoking/tobacco exposure. Current antihypertension treatment includes diuretics. The current treatment provides moderate improvement. There are no compliance problems.  There is no history of kidney disease, CAD/MI, CVA, heart failure, left ventricular hypertrophy or retinopathy.   Hyperlipidemia  This is a chronic problem. The current episode started more than 1 year ago. The problem is controlled. Factors aggravating her hyperlipidemia include smoking and thiazides. Current antihyperlipidemic treatment includes statins. The current treatment provides significant improvement of lipids.   COPD  This is a chronic problem. The current episode started more than 1 year ago. The problem occurs constantly. The problem has been waxing and waning. Pertinent negatives include no sweats. Exacerbated by: smokin. She reports moderate improvement on treatment.       Current Outpatient Medications on File Prior to Visit   Medication Sig Dispense Refill   • atorvastatin (LIPITOR) 20 MG tablet TAKE ONE TABLET BY MOUTH DAILY 90 tablet 2   • benzonatate (TESSALON) 200 MG capsule Take 1 capsule by mouth 3 (Three) Times a Day As Needed for Cough. 30 capsule 0   • ergocalciferol (ERGOCALCIFEROL) 1.25 MG (26397 UT) capsule      • fluticasone (FLONASE) 50 MCG/ACT nasal spray 2 sprays into the nostril(s) as directed by provider Daily. 16 g 6   • hydroCHLOROthiazide (HYDRODIURIL) 25 MG tablet Take 1 tablet by mouth Daily. 90 tablet 1   • ibuprofen  "(ADVIL,MOTRIN) 800 MG tablet Take 1 tablet by mouth Every 6 (Six) Hours As Needed for Mild Pain . 60 tablet 0   • rOPINIRole (REQUIP) 1 MG tablet TAKE ONE TABLET BY MOUTH EVERY NIGHT AT BEDTIME ONE HOUR BEFORE BEDTIME 90 tablet 1   • triamcinolone (KENALOG) 0.5 % ointment Apply  topically to the appropriate area as directed 2 (Two) Times a Day. 15 g 2   • vitamin B-12 (CYANOCOBALAMIN) 1000 MCG tablet Take 1 tablet by mouth Daily. 90 tablet 3   • [DISCONTINUED] albuterol sulfate  (90 Base) MCG/ACT inhaler Inhale 2 puffs Every 6 (Six) Hours As Needed for Wheezing. 1 inhaler 11   • [DISCONTINUED] azelastine (ASTELIN) 0.1 % nasal spray 2 sprays into the nostril(s) as directed by provider 2 (Two) Times a Day. Use in each nostril as directed 30 mL 12   • [DISCONTINUED] budesonide-formoterol (SYMBICORT) 80-4.5 MCG/ACT inhaler Inhale 2 puffs 2 (Two) Times a Day. 10.2 g 3     No current facility-administered medications on file prior to visit.     Allergies   Allergen Reactions   • Codeine Nausea And Vomiting       Objective   Vital Signs:   /78   Pulse 83   Ht 162.6 cm (64\")   Wt 60.4 kg (133 lb 1.6 oz)   SpO2 97%   BMI 22.85 kg/m²     Physical Exam  Vitals and nursing note reviewed.   Constitutional:       Appearance: Normal appearance. She is well-developed and normal weight.   HENT:      Head: Normocephalic and atraumatic.      Right Ear: Tympanic membrane, ear canal and external ear normal.      Left Ear: Tympanic membrane, ear canal and external ear normal.      Nose: Nose normal.      Mouth/Throat:      Mouth: Mucous membranes are moist.      Pharynx: Oropharynx is clear.   Eyes:      Extraocular Movements: Extraocular movements intact.      Conjunctiva/sclera: Conjunctivae normal.      Pupils: Pupils are equal, round, and reactive to light.   Cardiovascular:      Rate and Rhythm: Normal rate and regular rhythm.      Pulses: Normal pulses.      Heart sounds: Normal heart sounds.   Pulmonary:      " Effort: Pulmonary effort is normal.      Breath sounds: Normal breath sounds.   Chest:      Comments: Bilateral, manual breast exam performed and no dimpling, puckering, masses or nodules palpated    Abdominal:      General: Bowel sounds are normal.      Palpations: Abdomen is soft.   Musculoskeletal:         General: Normal range of motion.      Cervical back: Normal range of motion and neck supple.   Skin:     General: Skin is warm.      Capillary Refill: Capillary refill takes less than 2 seconds.   Neurological:      Mental Status: She is alert and oriented to person, place, and time.   Psychiatric:         Behavior: Behavior normal.        Result Review :     Common labs    Common Labsle 9/28/21   Glucose 89   BUN 10   Creatinine 1.0   eGFR African Am 69   Sodium 137   Potassium 4.2   Chloride 104   Calcium 9.8   Albumin 4.1   Total Bilirubin 0.49   Alkaline Phosphatase 82   AST (SGOT) 18   ALT (SGPT) 14      Comments are available for some flowsheets but are not being displayed.                     Assessment and Plan    Diagnoses and all orders for this visit:    1. Annual physical exam (Primary)    2. Benign hypertension  -     CBC & Differential; Future  -     Comprehensive Metabolic Panel; Future    3. Familial hypercholesterolemia  -     Lipid Panel; Future    4. Chronic obstructive pulmonary disease, unspecified COPD type (HCC)  -     albuterol sulfate  (90 Base) MCG/ACT inhaler; Inhale 2 puffs Every 6 (Six) Hours As Needed for Wheezing.  Dispense: 18 g; Refill: 5  -     budesonide-formoterol (SYMBICORT) 80-4.5 MCG/ACT inhaler; Inhale 2 puffs 2 (Two) Times a Day.  Dispense: 10.2 g; Refill: 3    5. Encounter for screening mammogram for malignant neoplasm of breast  -     Mammo Screening Digital Tomosynthesis Bilateral With CAD; Future    6. Screening for hypothyroidism  -     TSH; Future    1.  Annual physical exam:  Continue on current medications as previously prescribed   Counseling on  importance of heathy eating habits and regular physical activity regimen on improving overall physical and mental health.     2.  Benign hypertension:  Complete CBC and chemistry panel as ordered and will notify of results when available  Continue on hydrochlorothiazide as previously prescribed  Reduce him intake to no more than 1500 mg/day    3.  Familial hypercholesterolemia:  Complete fasting lipid panel as ordered and will notify of results when available  Continue on Lipitor as previously prescribed  Reduce saturated fats in diet by baking or air frying food    4.  Chronic obstructive pulmonary disease, unspecified COPD type:  Continue on albuterol inhaler as previously prescribed and refill prescription sent to pharmacy  Continue on Symbicort as previously prescribed and refill prescription sent to pharmacy  Seek emergency medical treatment for any new or worsening shortness of breath, chest tightness or difficulty breathing    5.  Encounter for screening mammogram for malignant neoplasm of breast:  Radiology will call to schedule bilateral mammogram  Encouraged bi-monthly self breast exams at home    6.  Screening for hypothyroidism:   Complete TSH as ordered and notify results when available    I spent 38 minutes caring for Patricia on this date of service. This time includes time spent by me in the following activities:preparing for the visit, reviewing tests, obtaining and/or reviewing a separately obtained history, performing a medically appropriate examination and/or evaluation , counseling and educating the patient/family/caregiver, ordering medications, tests, or procedures and documenting information in the medical record  Follow Up   Return in about 1 year (around 1/6/2023) for Annual physical.  Patient was given instructions and counseling regarding her condition or for health maintenance advice. Please see specific information pulled into the AVS if appropriate.         This document has been  electronically signed by JAI Mccurdy on January 6, 2022 13:45 CST

## 2022-02-21 RX ORDER — ERGOCALCIFEROL 1.25 MG/1
50000 CAPSULE ORAL WEEKLY
Qty: 12 CAPSULE | Refills: 2 | Status: SHIPPED | OUTPATIENT
Start: 2022-02-21

## 2022-03-15 ENCOUNTER — TELEPHONE (OUTPATIENT)
Dept: FAMILY MEDICINE CLINIC | Facility: CLINIC | Age: 57
End: 2022-03-15

## 2022-03-15 NOTE — TELEPHONE ENCOUNTER
Per JAI Erickson, Ms. Collins has been called with recent Screening Mammogram results & recommendations.  Continue current medications and follow-up as planned or sooner if any problems.         ----- Message from JAI Mccurdy sent at 3/15/2022 11:04 AM CDT -----  Repeat mammogram yearly.  Please call or send letter.

## 2022-04-22 DIAGNOSIS — G25.81 RESTLESS LEG SYNDROME: ICD-10-CM

## 2022-04-22 RX ORDER — ROPINIROLE 1 MG/1
TABLET, FILM COATED ORAL
Qty: 90 TABLET | Refills: 1 | OUTPATIENT
Start: 2022-04-22

## 2022-04-26 DIAGNOSIS — G25.81 RESTLESS LEG SYNDROME: ICD-10-CM

## 2022-04-26 RX ORDER — ROPINIROLE 1 MG/1
1 TABLET, FILM COATED ORAL NIGHTLY
Qty: 90 TABLET | Refills: 1 | Status: SHIPPED | OUTPATIENT
Start: 2022-04-26 | End: 2022-10-19 | Stop reason: SDUPTHER

## 2022-06-02 RX ORDER — BETAMETHASONE DIPROPIONATE 0.05 %
1 OINTMENT (GRAM) TOPICAL 2 TIMES DAILY
Qty: 50 G | Refills: 2 | Status: SHIPPED | OUTPATIENT
Start: 2022-06-02

## 2022-06-02 RX ORDER — IBUPROFEN 800 MG/1
800 TABLET ORAL EVERY 6 HOURS PRN
Qty: 60 TABLET | Refills: 0 | Status: SHIPPED | OUTPATIENT
Start: 2022-06-02

## 2022-07-08 DIAGNOSIS — I10 BENIGN HYPERTENSION: ICD-10-CM

## 2022-07-08 RX ORDER — HYDROCHLOROTHIAZIDE 25 MG/1
25 TABLET ORAL DAILY
Qty: 90 TABLET | Refills: 1 | Status: SHIPPED | OUTPATIENT
Start: 2022-07-08

## 2022-07-31 NOTE — PATIENT INSTRUCTIONS
For more information:    Quit Now [place]  1-800-QUIT-NOW  https://[place].quitlogix.org/en-[place]/  Steps to Quit Smoking  Smoking tobacco can be harmful to your health and can affect almost every organ in your body. Smoking puts you, and those around you, at risk for developing many serious chronic diseases. Quitting smoking is difficult, but it is one of the best things that you can do for your health. It is never too late to quit.  What are the benefits of quitting smoking?  When you quit smoking, you lower your risk of developing serious diseases and conditions, such as:  · Lung cancer or lung disease, such as COPD.  · Heart disease.  · Stroke.  · Heart attack.  · Infertility.  · Osteoporosis and bone fractures.  Additionally, symptoms such as coughing, wheezing, and shortness of breath may get better when you quit. You may also find that you get sick less often because your body is stronger at fighting off colds and infections. If you are pregnant, quitting smoking can help to reduce your chances of having a baby of low birth weight.  How do I get ready to quit?  When you decide to quit smoking, create a plan to make sure that you are successful. Before you quit:  · Pick a date to quit. Set a date within the next two weeks to give you time to prepare.  · Write down the reasons why you are quitting. Keep this list in places where you will see it often, such as on your bathroom mirror or in your car or wallet.  · Identify the people, places, things, and activities that make you want to smoke (triggers) and avoid them. Make sure to take these actions:  ¨ Throw away all cigarettes at home, at work, and in your car.  ¨ Throw away smoking accessories, such as ashtrays and lighters.  ¨ Clean your car and make sure to empty the ashtray.  ¨ Clean your home, including curtains and carpets.  · Tell your family, friends, and coworkers that you are quitting. Support from your loved ones can make quitting easier.  · Talk with  your health care provider about your options for quitting smoking.  · Find out what treatment options are covered by your health insurance.  What strategies can I use to quit smoking?  Talk with your healthcare provider about different strategies to quit smoking. Some strategies include:  · Quitting smoking altogether instead of gradually lessening how much you smoke over a period of time. Research shows that quitting “cold turkey” is more successful than gradually quitting.  · Attending in-person counseling to help you build problem-solving skills. You are more likely to have success in quitting if you attend several counseling sessions. Even short sessions of 10 minutes can be effective.  · Finding resources and support systems that can help you to quit smoking and remain smoke-free after you quit. These resources are most helpful when you use them often. They can include:  ¨ Online chats with a counselor.  ¨ Telephone quitlines.  ¨ Printed self-help materials.  ¨ Support groups or group counseling.  ¨ Text messaging programs.  ¨ Mobile phone applications.  · Taking medicines to help you quit smoking. (If you are pregnant or breastfeeding, talk with your health care provider first.) Some medicines contain nicotine and some do not. Both types of medicines help with cravings, but the medicines that include nicotine help to relieve withdrawal symptoms. Your health care provider may recommend:  ¨ Nicotine patches, gum, or lozenges.  ¨ Nicotine inhalers or sprays.  ¨ Non-nicotine medicine that is taken by mouth.  Talk with your health care provider about combining strategies, such as taking medicines while you are also receiving in-person counseling. Using these two strategies together makes you more likely to succeed in quitting than if you used either strategy on its own.  If you are pregnant or breastfeeding, talk with your health care provider about finding counseling or other support strategies to quit smoking. Do  not take medicine to help you quit smoking unless told to do so by your health care provider.  What things can I do to make it easier to quit?  Quitting smoking might feel overwhelming at first, but there is a lot that you can do to make it easier. Take these important actions:  · Reach out to your family and friends and ask that they support and encourage you during this time. Call telephone quitlines, reach out to support groups, or work with a counselor for support.  · Ask people who smoke to avoid smoking around you.  · Avoid places that trigger you to smoke, such as bars, parties, or smoke-break areas at work.  · Spend time around people who do not smoke.  · Lessen stress in your life, because stress can be a smoking trigger for some people. To lessen stress, try:  ¨ Exercising regularly.  ¨ Deep-breathing exercises.  ¨ Yoga.  ¨ Meditating.  ¨ Performing a body scan. This involves closing your eyes, scanning your body from head to toe, and noticing which parts of your body are particularly tense. Purposefully relax the muscles in those areas.  · Download or purchase mobile phone or tablet apps (applications) that can help you stick to your quit plan by providing reminders, tips, and encouragement. There are many free apps, such as QuitGuide from the CDC (Centers for Disease Control and Prevention). You can find other support for quitting smoking (smoking cessation) through smokefree.gov and other websites.  How will I feel when I quit smoking?  Within the first 24 hours of quitting smoking, you may start to feel some withdrawal symptoms. These symptoms are usually most noticeable 2-3 days after quitting, but they usually do not last beyond 2-3 weeks. Changes or symptoms that you might experience include:  · Mood swings.  · Restlessness, anxiety, or irritation.  · Difficulty concentrating.  · Dizziness.  · Strong cravings for sugary foods in addition to nicotine.  · Mild weight  gain.  · Constipation.  · Nausea.  · Coughing or a sore throat.  · Changes in how your medicines work in your body.  · A depressed mood.  · Difficulty sleeping (insomnia).  After the first 2-3 weeks of quitting, you may start to notice more positive results, such as:  · Improved sense of smell and taste.  · Decreased coughing and sore throat.  · Slower heart rate.  · Lower blood pressure.  · Clearer skin.  · The ability to breathe more easily.  · Fewer sick days.  Quitting smoking is very challenging for most people. Do not get discouraged if you are not successful the first time. Some people need to make many attempts to quit before they achieve long-term success. Do your best to stick to your quit plan, and talk with your health care provider if you have any questions or concerns.  This information is not intended to replace advice given to you by your health care provider. Make sure you discuss any questions you have with your health care provider.  Document Released: 12/12/2002 Document Revised: 08/15/2017 Document Reviewed: 05/03/2016  Elsevier Interactive Patient Education © 2017 [employer]vier Inc.     Never, once or twice, or a few times (0-2 times)

## 2022-08-07 ENCOUNTER — LAB (OUTPATIENT)
Dept: LAB | Facility: HOSPITAL | Age: 57
End: 2022-08-07

## 2022-08-07 DIAGNOSIS — R19.7 DIARRHEA, UNSPECIFIED TYPE: ICD-10-CM

## 2022-08-07 LAB
ADV 40+41 DNA STL QL NAA+NON-PROBE: NOT DETECTED
ASTRO TYP 1-8 RNA STL QL NAA+NON-PROBE: NOT DETECTED
C CAYETANENSIS DNA STL QL NAA+NON-PROBE: NOT DETECTED
C COLI+JEJ+UPSA DNA STL QL NAA+NON-PROBE: NOT DETECTED
C DIFF TOX GENS STL QL NAA+PROBE: NEGATIVE
CRYPTOSP DNA STL QL NAA+NON-PROBE: NOT DETECTED
E HISTOLYT DNA STL QL NAA+NON-PROBE: NOT DETECTED
EAEC PAA PLAS AGGR+AATA ST NAA+NON-PRB: NOT DETECTED
EC STX1+STX2 GENES STL QL NAA+NON-PROBE: NOT DETECTED
EPEC EAE GENE STL QL NAA+NON-PROBE: NOT DETECTED
ETEC LTA+ST1A+ST1B TOX ST NAA+NON-PROBE: NOT DETECTED
G LAMBLIA DNA STL QL NAA+NON-PROBE: NOT DETECTED
NOROVIRUS GI+II RNA STL QL NAA+NON-PROBE: NOT DETECTED
P SHIGELLOIDES DNA STL QL NAA+NON-PROBE: NOT DETECTED
RVA RNA STL QL NAA+NON-PROBE: NOT DETECTED
S ENT+BONG DNA STL QL NAA+NON-PROBE: NOT DETECTED
SAPO I+II+IV+V RNA STL QL NAA+NON-PROBE: NOT DETECTED
SHIGELLA SP+EIEC IPAH ST NAA+NON-PROBE: NOT DETECTED
V CHOL+PARA+VUL DNA STL QL NAA+NON-PROBE: NOT DETECTED
V CHOLERAE DNA STL QL NAA+NON-PROBE: NOT DETECTED
Y ENTEROCOL DNA STL QL NAA+NON-PROBE: NOT DETECTED

## 2022-08-07 PROCEDURE — 87493 C DIFF AMPLIFIED PROBE: CPT

## 2022-08-07 PROCEDURE — 87507 IADNA-DNA/RNA PROBE TQ 12-25: CPT

## 2022-10-19 DIAGNOSIS — G25.81 RESTLESS LEG SYNDROME: ICD-10-CM

## 2022-10-19 RX ORDER — ROPINIROLE 1 MG/1
1 TABLET, FILM COATED ORAL NIGHTLY
Qty: 90 TABLET | Refills: 1 | Status: SHIPPED | OUTPATIENT
Start: 2022-10-19

## 2023-01-09 ENCOUNTER — OFFICE VISIT (OUTPATIENT)
Dept: FAMILY MEDICINE CLINIC | Facility: CLINIC | Age: 58
End: 2023-01-09
Payer: MEDICAID

## 2023-01-09 ENCOUNTER — LAB (OUTPATIENT)
Dept: LAB | Facility: HOSPITAL | Age: 58
End: 2023-01-09
Payer: MEDICAID

## 2023-01-09 VITALS
HEIGHT: 64 IN | BODY MASS INDEX: 23.41 KG/M2 | DIASTOLIC BLOOD PRESSURE: 82 MMHG | WEIGHT: 137.1 LBS | HEART RATE: 76 BPM | OXYGEN SATURATION: 97 % | SYSTOLIC BLOOD PRESSURE: 126 MMHG

## 2023-01-09 DIAGNOSIS — E53.8 VITAMIN B 12 DEFICIENCY: ICD-10-CM

## 2023-01-09 DIAGNOSIS — I10 BENIGN HYPERTENSION: ICD-10-CM

## 2023-01-09 DIAGNOSIS — Z13.29 SCREENING FOR HYPOTHYROIDISM: ICD-10-CM

## 2023-01-09 DIAGNOSIS — E78.01 FAMILIAL HYPERCHOLESTEROLEMIA: ICD-10-CM

## 2023-01-09 DIAGNOSIS — G25.81 RESTLESS LEG SYNDROME: ICD-10-CM

## 2023-01-09 DIAGNOSIS — Z00.00 ANNUAL PHYSICAL EXAM: Primary | ICD-10-CM

## 2023-01-09 DIAGNOSIS — Z12.31 ENCOUNTER FOR SCREENING MAMMOGRAM FOR MALIGNANT NEOPLASM OF BREAST: ICD-10-CM

## 2023-01-09 DIAGNOSIS — J44.9 CHRONIC OBSTRUCTIVE PULMONARY DISEASE, UNSPECIFIED COPD TYPE: ICD-10-CM

## 2023-01-09 DIAGNOSIS — Z23 NEED FOR PNEUMOCOCCAL VACCINE: ICD-10-CM

## 2023-01-09 LAB
ALBUMIN SERPL-MCNC: 4.5 G/DL (ref 3.5–5.2)
ALBUMIN/GLOB SERPL: 1.9 G/DL
ALP SERPL-CCNC: 91 U/L (ref 39–117)
ALT SERPL W P-5'-P-CCNC: 15 U/L (ref 1–33)
ANION GAP SERPL CALCULATED.3IONS-SCNC: 7.3 MMOL/L (ref 5–15)
AST SERPL-CCNC: 18 U/L (ref 1–32)
BASOPHILS # BLD AUTO: 0.04 10*3/MM3 (ref 0–0.2)
BASOPHILS NFR BLD AUTO: 0.7 % (ref 0–1.5)
BILIRUB SERPL-MCNC: 0.4 MG/DL (ref 0–1.2)
BUN SERPL-MCNC: 14 MG/DL (ref 6–20)
BUN/CREAT SERPL: 15.7 (ref 7–25)
CALCIUM SPEC-SCNC: 9.6 MG/DL (ref 8.6–10.5)
CHLORIDE SERPL-SCNC: 104 MMOL/L (ref 98–107)
CHOLEST SERPL-MCNC: 227 MG/DL (ref 0–200)
CO2 SERPL-SCNC: 26.7 MMOL/L (ref 22–29)
CREAT SERPL-MCNC: 0.89 MG/DL (ref 0.57–1)
DEPRECATED RDW RBC AUTO: 40 FL (ref 37–54)
EGFRCR SERPLBLD CKD-EPI 2021: 75.3 ML/MIN/1.73
EOSINOPHIL # BLD AUTO: 0.22 10*3/MM3 (ref 0–0.4)
EOSINOPHIL NFR BLD AUTO: 3.9 % (ref 0.3–6.2)
ERYTHROCYTE [DISTWIDTH] IN BLOOD BY AUTOMATED COUNT: 11.9 % (ref 12.3–15.4)
GLOBULIN UR ELPH-MCNC: 2.4 GM/DL
GLUCOSE SERPL-MCNC: 95 MG/DL (ref 65–99)
HCT VFR BLD AUTO: 40.8 % (ref 34–46.6)
HDLC SERPL-MCNC: 44 MG/DL (ref 40–60)
HGB BLD-MCNC: 14.2 G/DL (ref 12–15.9)
IMM GRANULOCYTES # BLD AUTO: 0.02 10*3/MM3 (ref 0–0.05)
IMM GRANULOCYTES NFR BLD AUTO: 0.4 % (ref 0–0.5)
LDLC SERPL CALC-MCNC: 167 MG/DL (ref 0–100)
LDLC/HDLC SERPL: 3.74 {RATIO}
LYMPHOCYTES # BLD AUTO: 1.44 10*3/MM3 (ref 0.7–3.1)
LYMPHOCYTES NFR BLD AUTO: 25.5 % (ref 19.6–45.3)
MCH RBC QN AUTO: 32.1 PG (ref 26.6–33)
MCHC RBC AUTO-ENTMCNC: 34.8 G/DL (ref 31.5–35.7)
MCV RBC AUTO: 92.3 FL (ref 79–97)
MONOCYTES # BLD AUTO: 0.44 10*3/MM3 (ref 0.1–0.9)
MONOCYTES NFR BLD AUTO: 7.8 % (ref 5–12)
NEUTROPHILS NFR BLD AUTO: 3.49 10*3/MM3 (ref 1.7–7)
NEUTROPHILS NFR BLD AUTO: 61.7 % (ref 42.7–76)
NRBC BLD AUTO-RTO: 0 /100 WBC (ref 0–0.2)
PLATELET # BLD AUTO: 194 10*3/MM3 (ref 140–450)
PMV BLD AUTO: 10.1 FL (ref 6–12)
POTASSIUM SERPL-SCNC: 4.9 MMOL/L (ref 3.5–5.2)
PROT SERPL-MCNC: 6.9 G/DL (ref 6–8.5)
RBC # BLD AUTO: 4.42 10*6/MM3 (ref 3.77–5.28)
SODIUM SERPL-SCNC: 138 MMOL/L (ref 136–145)
TRIGL SERPL-MCNC: 92 MG/DL (ref 0–150)
TSH SERPL DL<=0.05 MIU/L-ACNC: 3.37 UIU/ML (ref 0.27–4.2)
VIT B12 BLD-MCNC: 547 PG/ML (ref 211–946)
VLDLC SERPL-MCNC: 16 MG/DL (ref 5–40)
WBC NRBC COR # BLD: 5.65 10*3/MM3 (ref 3.4–10.8)

## 2023-01-09 PROCEDURE — 82607 VITAMIN B-12: CPT

## 2023-01-09 PROCEDURE — 90471 IMMUNIZATION ADMIN: CPT | Performed by: NURSE PRACTITIONER

## 2023-01-09 PROCEDURE — 36415 COLL VENOUS BLD VENIPUNCTURE: CPT

## 2023-01-09 PROCEDURE — 85025 COMPLETE CBC W/AUTO DIFF WBC: CPT

## 2023-01-09 PROCEDURE — 80061 LIPID PANEL: CPT

## 2023-01-09 PROCEDURE — 90677 PCV20 VACCINE IM: CPT | Performed by: NURSE PRACTITIONER

## 2023-01-09 PROCEDURE — 80053 COMPREHEN METABOLIC PANEL: CPT

## 2023-01-09 PROCEDURE — 99396 PREV VISIT EST AGE 40-64: CPT | Performed by: NURSE PRACTITIONER

## 2023-01-09 PROCEDURE — 84443 ASSAY THYROID STIM HORMONE: CPT

## 2023-01-09 NOTE — PROGRESS NOTES
Chief Complaint  Annual Exam    Subjective  Annual physical exam.      Patricia Collins presents to [facility] [place]  History of Present Illness  HPI:  Annual physical exam   Hypertension  This is a chronic problem. The current episode started more than 1 year ago. The problem is unchanged. The problem is controlled. Pertinent negatives include no sweats. There are no associated agents to hypertension. Risk factors for coronary artery disease include stress, post-menopausal state and smoking/tobacco exposure. Current antihypertension treatment includes diuretics. The current treatment provides moderate improvement. There are no compliance problems.  There is no history of kidney disease, CAD/MI, CVA, heart failure, left ventricular hypertrophy or retinopathy.   Hyperlipidemia  This is a chronic problem. The current episode started more than 1 year ago. The problem is controlled. Factors aggravating her hyperlipidemia include smoking and thiazides. Current antihyperlipidemic treatment includes statins. The current treatment provides significant improvement of lipids.   COPD  This is a chronic problem. The current episode started more than 1 year ago. The problem occurs constantly. The problem has been waxing and waning. Pertinent negatives include no sweats. Exacerbated by: smokin. She reports moderate improvement on treatment.     Current Outpatient Medications on File Prior to Visit   Medication Sig Dispense Refill   • albuterol sulfate  (90 Base) MCG/ACT inhaler Inhale 2 puffs Every 6 (Six) Hours As Needed for Wheezing. 18 g 5   • atorvastatin (LIPITOR) 20 MG tablet TAKE ONE TABLET BY MOUTH DAILY 90 tablet 2   • betamethasone dipropionate (DIPROLENE) 0.05 % ointment Apply 1 application topically to the appropriate area as directed 2 (Two) Times a Day. 50 g 2   • budesonide-formoterol (SYMBICORT) 80-4.5 MCG/ACT inhaler Inhale 2 puffs 2 (Two) Times a Day.  10.2 g 3   • ergocalciferol (ERGOCALCIFEROL) 1.25 MG (43128 UT) capsule Take 1 capsule by mouth 1 (One) Time Per Week. 12 capsule 2   • fluticasone (FLONASE) 50 MCG/ACT nasal spray 2 sprays into the nostril(s) as directed by provider Daily. 16 g 6   • hydroCHLOROthiazide (HYDRODIURIL) 25 MG tablet Take 1 tablet by mouth Daily. 90 tablet 1   • ibuprofen (ADVIL,MOTRIN) 800 MG tablet Take 1 tablet by mouth Every 6 (Six) Hours As Needed for Mild Pain . 60 tablet 0   • rOPINIRole (REQUIP) 1 MG tablet Take 1 tablet by mouth Every Night. Take 1 hour before bedtime. 90 tablet 1   • vitamin B-12 (CYANOCOBALAMIN) 1000 MCG tablet Take 1 tablet by mouth Daily. 90 tablet 3   • [DISCONTINUED] hyoscyamine (LEVSIN) 0.125 MG SL tablet Take 1 tablet by mouth Every 4 (Four) Hours As Needed for Cramping or Diarrhea. 30 tablet 0     No current facility-administered medications on file prior to visit.     Allergies   Allergen Reactions   • Codeine Nausea And Vomiting       Objective   Vital Signs:  /82   Pulse 76   Ht 162.6 cm (64\")   Wt 62.2 kg (137 lb 1.6 oz)   SpO2 97%   BMI 23.53 kg/m²   Estimated body mass index is 23.53 kg/m² as calculated from the following:    Height as of this encounter: 162.6 cm (64\").    Weight as of this encounter: 62.2 kg (137 lb 1.6 oz).    BMI is within normal parameters. No other follow-up for BMI required.      Physical Exam  Vitals and nursing note reviewed.   Constitutional:       Appearance: Normal appearance. She is well-developed and normal weight.   HENT:      Head: Normocephalic.      Right Ear: Ear canal and external ear normal.      Left Ear: Ear canal and external ear normal. Tympanic membrane is erythematous.      Nose: Nose normal.      Mouth/Throat:      Mouth: Mucous membranes are moist.      Pharynx: Oropharynx is clear. Posterior oropharyngeal erythema present.   Eyes:      Extraocular Movements: Extraocular movements intact.      Conjunctiva/sclera: Conjunctivae normal.       Pupils: Pupils are equal, round, and reactive to light.   Cardiovascular:      Rate and Rhythm: Normal rate and regular rhythm.      Pulses: Normal pulses.      Heart sounds: Normal heart sounds.   Pulmonary:      Effort: Pulmonary effort is normal.      Breath sounds: Normal breath sounds.   Abdominal:      General: Bowel sounds are normal.      Palpations: Abdomen is soft.   Musculoskeletal:         General: Normal range of motion.      Cervical back: Normal range of motion and neck supple.   Skin:     General: Skin is warm.      Capillary Refill: Capillary refill takes less than 2 seconds.   Neurological:      Mental Status: She is alert and oriented to person, place, and time.   Psychiatric:         Behavior: Behavior normal.        Result Review :  The following data was reviewed by: JAI Mccurdy on 01/09/2023:                    Assessment and Plan   Diagnoses and all orders for this visit:    1. Annual physical exam (Primary)    2. Benign hypertension  -     CBC & Differential; Future  -     Comprehensive Metabolic Panel; Future    3. Familial hypercholesterolemia  -     Lipid panel; Future    4. Chronic obstructive pulmonary disease, unspecified COPD type (HCC)    5. Restless leg syndrome    6. Vitamin B 12 deficiency  -     Vitamin B12; Future    7. Need for pneumococcal vaccine  -     Pneumococcal Conjugate Vaccine 20-Valent (PCV20)    8. Encounter for screening mammogram for malignant neoplasm of breast  -     Mammo Screening Digital Tomosynthesis Bilateral With CAD; Future    9. Screening for hypothyroidism  -     TSH; Future    1.  Annual physical exam:  Continue on current medications as previously prescribed   Counseling on importance of heathy eating habits and regular physical activity regimen on improving overall physical and mental health.     2.  Benign hypertension:  Complete CBC chemistry panel as ordered and will notify of results when available  Continue on hydrochlorothiazide as  previously prescribed  Continue to adhere to low-sodium diet  Target systolic BP to remain below 130     3.  Familial hypercholesterolemia:  Continue Lipitor as previously prescribed  Continue to adhere to a low-fat diet, try baking, steaming or air frying foods     4.  COPD:  Continue albuterol inhaler as previously prescribed  Continue Symbicort as previously prescribed  Declines smoking cessation    5.  Restless leg syndrome:  Continue Requip as previously prescribed    6.  Vitamin B12 deficiency:  Complete vitamin B12 level as ordered and notify results when available  Continue B12 supplement as previously prescribed    7.  Need for pneumococcal vaccine:  Pneumovax 20 given IM in office  Educated on possible side effects of this medication including but not limited to increased risk for pain, swelling and redness of injection site    8.  Encounter for screening mammogram for malignant neoplasm of breast:  Radiology will call to schedule bilateral mammogram  Encouraged monthly self breast exams at home    9.  Screening for hypothyroidism:  Complete TSH as ordered and will notify of results when available         Follow Up   Return in about 1 year (around 1/9/2024) for Annual physical.  Patient was given instructions and counseling regarding her condition or for health maintenance advice. Please see specific information pulled into the AVS if appropriate.         This document has been electronically signed by JAI Mccurdy on January 9, 2023 09:33 CST

## 2023-01-10 ENCOUNTER — TELEPHONE (OUTPATIENT)
Dept: FAMILY MEDICINE CLINIC | Facility: CLINIC | Age: 58
End: 2023-01-10
Payer: MEDICAID

## 2023-01-10 DIAGNOSIS — E78.01 FAMILIAL HYPERCHOLESTEROLEMIA: Primary | ICD-10-CM

## 2023-01-11 NOTE — PROGRESS NOTES
Per JAI Erickson, Ms. Collins has been called with recent lab results & recommendations.  Continue current medications and follow-up as planned or sooner if any problems.

## 2023-01-11 NOTE — TELEPHONE ENCOUNTER
Per JAI Erickson, Ms. Collins has been called with recent lab results & recommendations.  Continue current medications and follow-up as planned or sooner if any problems.       ----- Message from JAI Mccurdy sent at 1/10/2023  9:18 AM CST -----  Total cholesterol and bad cholesterol have increased.  She needs adhere to a low-fat diet and increase low-impact exercise such as walking.  Needs repeat fasting lipid panel in 3 months.  All other labs were essentially normal.

## 2023-01-18 DIAGNOSIS — E78.2 MIXED HYPERLIPIDEMIA: ICD-10-CM

## 2023-01-18 DIAGNOSIS — E53.8 VITAMIN B 12 DEFICIENCY: ICD-10-CM

## 2023-01-18 RX ORDER — ATORVASTATIN CALCIUM 20 MG/1
20 TABLET, FILM COATED ORAL DAILY
Qty: 90 TABLET | Refills: 2 | Status: SHIPPED | OUTPATIENT
Start: 2023-01-18

## 2023-01-18 RX ORDER — LANOLIN ALCOHOL/MO/W.PET/CERES
1000 CREAM (GRAM) TOPICAL DAILY
Qty: 90 TABLET | Refills: 3 | Status: SHIPPED | OUTPATIENT
Start: 2023-01-18

## 2023-01-26 DIAGNOSIS — J44.9 CHRONIC OBSTRUCTIVE PULMONARY DISEASE, UNSPECIFIED COPD TYPE: ICD-10-CM

## 2023-01-27 RX ORDER — BUDESONIDE AND FORMOTEROL FUMARATE DIHYDRATE 80; 4.5 UG/1; UG/1
2 AEROSOL RESPIRATORY (INHALATION)
Qty: 10.2 G | Refills: 3 | Status: SHIPPED | OUTPATIENT
Start: 2023-01-27

## 2023-03-13 DIAGNOSIS — R05.1 ACUTE COUGH: Primary | ICD-10-CM

## 2023-03-13 RX ORDER — BROMPHENIRAMINE MALEATE, PSEUDOEPHEDRINE HYDROCHLORIDE, AND DEXTROMETHORPHAN HYDROBROMIDE 2; 30; 10 MG/5ML; MG/5ML; MG/5ML
5 SYRUP ORAL 4 TIMES DAILY PRN
Qty: 120 ML | Refills: 0 | Status: SHIPPED | OUTPATIENT
Start: 2023-03-13

## 2023-03-30 ENCOUNTER — TELEPHONE (OUTPATIENT)
Dept: FAMILY MEDICINE CLINIC | Facility: CLINIC | Age: 58
End: 2023-03-30
Payer: MEDICAID

## 2023-04-10 RX ORDER — IBUPROFEN 800 MG/1
800 TABLET ORAL EVERY 6 HOURS PRN
Qty: 60 TABLET | Refills: 0 | Status: SHIPPED | OUTPATIENT
Start: 2023-04-10

## 2023-04-17 DIAGNOSIS — G25.81 RESTLESS LEG SYNDROME: ICD-10-CM

## 2023-04-17 RX ORDER — ROPINIROLE 1 MG/1
1 TABLET, FILM COATED ORAL NIGHTLY
Qty: 90 TABLET | Refills: 1 | Status: SHIPPED | OUTPATIENT
Start: 2023-04-17

## 2023-06-16 ENCOUNTER — LAB (OUTPATIENT)
Dept: LAB | Facility: HOSPITAL | Age: 58
End: 2023-06-16
Payer: MEDICAID

## 2023-06-16 DIAGNOSIS — E78.01 FAMILIAL HYPERCHOLESTEROLEMIA: ICD-10-CM

## 2023-06-16 PROCEDURE — 80061 LIPID PANEL: CPT

## 2023-06-17 LAB
CHOLEST SERPL-MCNC: 235 MG/DL (ref 0–200)
HDLC SERPL-MCNC: 60 MG/DL (ref 40–60)
LDLC SERPL CALC-MCNC: 160 MG/DL (ref 0–100)
LDLC/HDLC SERPL: 2.63 {RATIO}
TRIGL SERPL-MCNC: 85 MG/DL (ref 0–150)
VLDLC SERPL-MCNC: 15 MG/DL (ref 5–40)

## 2023-06-19 ENCOUNTER — TELEPHONE (OUTPATIENT)
Dept: FAMILY MEDICINE CLINIC | Facility: CLINIC | Age: 58
End: 2023-06-19
Payer: MEDICAID

## 2023-06-19 NOTE — PROGRESS NOTES
Per Daniel Erickson, Ms. Collins has been called with recent lab results & recommendations.  Continue current medications and follow-up as planned or sooner if any problems.

## 2023-06-19 NOTE — TELEPHONE ENCOUNTER
Per Jai Erickson, Ms. Collins has been called with recent lab results & recommendations.  Continue current medications and follow-up as planned or sooner if any problems.     ----- Message from JAI Mccurdy sent at 6/19/2023  9:14 AM CDT -----  Total cholesterol and bad cholesterol remain elevated.  Please make sure she is still taking her Lipitor 20 mg nightly.  She needs to reduce saturated fats in her diet.

## 2023-08-07 RX ORDER — IBUPROFEN 800 MG/1
800 TABLET ORAL EVERY 6 HOURS PRN
Qty: 60 TABLET | Refills: 0 | Status: SHIPPED | OUTPATIENT
Start: 2023-08-07

## 2024-05-31 NOTE — TELEPHONE ENCOUNTER
I spoke with patient by phone after her recent colonoscopy.  Patient has a family history her father had colon cancer.  On colonoscopy she did adequate job with her prep and she had one small what was thought to be polyp biopsied which was completely benign and was not polyp of any significance.  I went over these findings with her.  Would recommend with her family history that she have another colonoscopy in 5 years or sooner should any other concerns or questions   Skin normal color for race, warm, dry and intact. No evidence of rash. 170.18

## (undated) DEVICE — METER,URINE,400ML,DRAIN BAG,L/F,LL,SLIDE: Brand: MEDLINE

## (undated) DEVICE — SOL PVPI SPRY BETADINE 3OZ

## (undated) DEVICE — SINGLE-USE BIOPSY FORCEPS: Brand: RADIAL JAW 4

## (undated) DEVICE — INTRO CATH SUPRAPUB LAWRENCE 16FORNG

## (undated) DEVICE — GLV SURG SENSICARE GREEN W/ALOE PF LF 6.5 STRL

## (undated) DEVICE — CANN SMPL SOFTECH BIFLO ETCO2 A/M 7FT

## (undated) DEVICE — GLV SURG NEOLON 2G PF LF 6.5 STRL

## (undated) DEVICE — PK CYSTO LF 60

## (undated) DEVICE — GLV SURG NEOLON 2G PF LF 7.5 STRL

## (undated) DEVICE — SYR LL TP 10ML STRL

## (undated) DEVICE — CATHETER,FOLEY,100%SILICONE,16FR,10ML,LF: Brand: MEDLINE

## (undated) DEVICE — SOL IRR H2O BTL 1000ML STRL

## (undated) DEVICE — SOL IRRG H2O PL/BG 1000ML STRL